# Patient Record
Sex: FEMALE | Race: WHITE | NOT HISPANIC OR LATINO | Employment: OTHER | URBAN - METROPOLITAN AREA
[De-identification: names, ages, dates, MRNs, and addresses within clinical notes are randomized per-mention and may not be internally consistent; named-entity substitution may affect disease eponyms.]

---

## 2017-03-01 RX ORDER — SIMVASTATIN 20 MG/1
20 TABLET, FILM COATED ORAL DAILY
Qty: 90 TABLET | Refills: 3 | Status: CANCELLED | OUTPATIENT
Start: 2017-03-01

## 2017-09-06 RX ORDER — CLOPIDOGREL BISULFATE 75 MG/1
TABLET ORAL
Qty: 90 TABLET | Refills: 3 | OUTPATIENT
Start: 2017-09-06

## 2017-11-13 RX ORDER — CLOPIDOGREL BISULFATE 75 MG/1
TABLET ORAL
Qty: 90 TABLET | Refills: 3 | OUTPATIENT
Start: 2017-11-13

## 2019-12-02 ENCOUNTER — TELEPHONE (OUTPATIENT)
Dept: FAMILY MEDICINE | Facility: CLINIC | Age: 84
End: 2019-12-02

## 2019-12-02 NOTE — TELEPHONE ENCOUNTER
----- Message from Analia Sethi sent at 12/2/2019  1:56 PM CST -----  - pt is calling to let dr. Jasmine know that dr. patton has decided not to due pacemaker. He does not think it will due her enough good.   [phone]

## 2019-12-10 ENCOUNTER — TELEPHONE (OUTPATIENT)
Dept: FAMILY MEDICINE | Facility: CLINIC | Age: 84
End: 2019-12-10

## 2019-12-10 DIAGNOSIS — E11.9 DIABETES MELLITUS TYPE 2, CONTROLLED, WITHOUT COMPLICATIONS: ICD-10-CM

## 2019-12-10 RX ORDER — GLIPIZIDE 2.5 MG/1
2.5 TABLET, EXTENDED RELEASE ORAL
Qty: 90 TABLET | Refills: 1 | Status: SHIPPED | OUTPATIENT
Start: 2019-12-10 | End: 2020-02-04 | Stop reason: SDUPTHER

## 2019-12-10 NOTE — TELEPHONE ENCOUNTER
----- Message from De Iyer sent at 12/10/2019  9:30 AM CST -----  Contact: Tierra Jennings  Needs Glipizide 2.5 MG tab  Send to [facility] on [address]  Pt# [phone]

## 2019-12-10 NOTE — TELEPHONE ENCOUNTER
----- Message from De Iyer sent at 12/10/2019  9:30 AM CST -----  Contact: Tierra Jennings  Needs Glipizide 2.5 MG tab  Send to [facility] on [address]  Pt# [phone]

## 2020-01-09 RX ORDER — METFORMIN HYDROCHLORIDE EXTENDED-RELEASE TABLETS 500 MG/1
500 TABLET, FILM COATED, EXTENDED RELEASE ORAL
Qty: 30 TABLET | Refills: 5 | Status: SHIPPED | OUTPATIENT
Start: 2020-01-09 | End: 2020-01-14

## 2020-01-09 NOTE — TELEPHONE ENCOUNTER
----- Message from Maria De Jesus Parsons sent at 1/9/2020 11:56 AM CST -----  Refills on metformin   Pharm [facility] [address]   Pt [phone]

## 2020-01-13 ENCOUNTER — TELEPHONE (OUTPATIENT)
Dept: FAMILY MEDICINE | Facility: CLINIC | Age: 85
End: 2020-01-13

## 2020-01-13 NOTE — TELEPHONE ENCOUNTER
----- Message from De Iyer sent at 1/13/2020  3:06 PM CST -----  Contact: Tierra Jennings  Wrong meds was sent to pharmacy. Pt needs Metformin  Send to [facility] on on [address]  Pt# [phone]

## 2020-01-14 DIAGNOSIS — E11.8 CONTROLLED TYPE 2 DIABETES MELLITUS WITH COMPLICATION, WITHOUT LONG-TERM CURRENT USE OF INSULIN: Primary | ICD-10-CM

## 2020-01-14 RX ORDER — METFORMIN HYDROCHLORIDE 500 MG/1
500 TABLET, EXTENDED RELEASE ORAL
Qty: 30 TABLET | Refills: 0 | Status: SHIPPED | OUTPATIENT
Start: 2020-01-14 | End: 2020-02-04 | Stop reason: SDUPTHER

## 2020-01-14 NOTE — TELEPHONE ENCOUNTER
Spoke with pt she needs refills on her metformin. She is having trouble getting a ride. Pt was told we can only give her 30 days then she will need call back and schedule an appt.

## 2020-01-14 NOTE — TELEPHONE ENCOUNTER
----- Message from Analia Sethi sent at 1/14/2020 10:01 AM CST -----  - pt has called twice about flu shots and said she has never received a call back. She is confused. And she needs refills, daughter has list if we need it faxed   [phone]

## 2020-02-04 ENCOUNTER — OFFICE VISIT (OUTPATIENT)
Dept: FAMILY MEDICINE | Facility: CLINIC | Age: 85
End: 2020-02-04
Payer: MEDICARE

## 2020-02-04 VITALS
HEART RATE: 68 BPM | BODY MASS INDEX: 44.59 KG/M2 | SYSTOLIC BLOOD PRESSURE: 110 MMHG | DIASTOLIC BLOOD PRESSURE: 70 MMHG | HEIGHT: 66 IN

## 2020-02-04 DIAGNOSIS — I10 ESSENTIAL HYPERTENSION: ICD-10-CM

## 2020-02-04 DIAGNOSIS — E11.8 CONTROLLED TYPE 2 DIABETES MELLITUS WITH COMPLICATION, WITHOUT LONG-TERM CURRENT USE OF INSULIN: ICD-10-CM

## 2020-02-04 DIAGNOSIS — E78.2 MIXED HYPERLIPIDEMIA: Primary | ICD-10-CM

## 2020-02-04 DIAGNOSIS — G62.9 NEUROPATHY: ICD-10-CM

## 2020-02-04 LAB — HBA1C MFR BLD: 6.5 %

## 2020-02-04 PROCEDURE — 83036 HEMOGLOBIN GLYCOSYLATED A1C: CPT | Mod: QW,,, | Performed by: NURSE PRACTITIONER

## 2020-02-04 PROCEDURE — 99214 OFFICE O/P EST MOD 30 MIN: CPT | Mod: S$GLB,,, | Performed by: NURSE PRACTITIONER

## 2020-02-04 PROCEDURE — 83036 POCT HEMOGLOBIN A1C: ICD-10-PCS | Mod: QW,,, | Performed by: NURSE PRACTITIONER

## 2020-02-04 PROCEDURE — 99214 PR OFFICE/OUTPT VISIT, EST, LEVL IV, 30-39 MIN: ICD-10-PCS | Mod: S$GLB,,, | Performed by: NURSE PRACTITIONER

## 2020-02-04 RX ORDER — METFORMIN HYDROCHLORIDE 500 MG/1
500 TABLET, EXTENDED RELEASE ORAL
Qty: 90 TABLET | Refills: 1 | Status: SHIPPED | OUTPATIENT
Start: 2020-02-04 | End: 2020-08-04 | Stop reason: SDUPTHER

## 2020-02-04 RX ORDER — OLMESARTAN MEDOXOMIL 40 MG/1
40 TABLET ORAL DAILY
Qty: 90 TABLET | Refills: 1 | Status: SHIPPED | OUTPATIENT
Start: 2020-02-04 | End: 2020-08-04 | Stop reason: SDUPTHER

## 2020-02-04 RX ORDER — FUROSEMIDE 40 MG/1
20 TABLET ORAL DAILY
COMMUNITY
Start: 2020-01-16 | End: 2021-02-25 | Stop reason: SDUPTHER

## 2020-02-04 RX ORDER — FLECAINIDE ACETATE 50 MG/1
50 TABLET ORAL 2 TIMES DAILY
COMMUNITY
Start: 2019-12-17 | End: 2021-03-08

## 2020-02-04 RX ORDER — APIXABAN 5 MG/1
5 TABLET, FILM COATED ORAL 2 TIMES DAILY
COMMUNITY
Start: 2020-01-24 | End: 2020-10-30 | Stop reason: SDUPTHER

## 2020-02-04 RX ORDER — GABAPENTIN 100 MG/1
100 CAPSULE ORAL DAILY
Refills: 1 | COMMUNITY
Start: 2019-11-25 | End: 2020-02-04 | Stop reason: SDUPTHER

## 2020-02-04 RX ORDER — ATORVASTATIN CALCIUM 40 MG/1
40 TABLET, FILM COATED ORAL NIGHTLY
COMMUNITY
Start: 2019-12-17 | End: 2021-02-25 | Stop reason: SDUPTHER

## 2020-02-04 RX ORDER — GLIPIZIDE 2.5 MG/1
2.5 TABLET, EXTENDED RELEASE ORAL
Qty: 90 TABLET | Refills: 1 | Status: SHIPPED | OUTPATIENT
Start: 2020-02-04 | End: 2020-08-04 | Stop reason: SDUPTHER

## 2020-02-04 RX ORDER — GABAPENTIN 100 MG/1
100 CAPSULE ORAL DAILY
Qty: 90 CAPSULE | Refills: 1 | Status: SHIPPED | OUTPATIENT
Start: 2020-02-04 | End: 2020-08-04 | Stop reason: SDUPTHER

## 2020-02-04 RX ORDER — AMLODIPINE BESYLATE 5 MG/1
5 TABLET ORAL NIGHTLY
Qty: 90 TABLET | Refills: 1 | Status: SHIPPED | OUTPATIENT
Start: 2020-02-04 | End: 2020-12-01 | Stop reason: SDUPTHER

## 2020-02-04 RX ORDER — METOPROLOL SUCCINATE 25 MG/1
12.5 TABLET, EXTENDED RELEASE ORAL DAILY
COMMUNITY
Start: 2020-01-28 | End: 2020-08-04 | Stop reason: SDUPTHER

## 2020-02-04 NOTE — PROGRESS NOTES
SUBJECTIVE:    Patient ID: Tierra Jennings is a 86 y.o. female.    Chief Complaint: Diabetes (6 month follow up.....mlr) and Medication Refill (Med bottles present and reconciled....pharmacy verified....mlr)    Pt presents for routine visit. History of DM, HTN, HLD, Afib. Chronic anticoagulation. Has not been checking her CBG regularly because she is having trouble working the new lancets she received. Reports she is doing well. No new complaints. Sees Dr. Diaz every 6 months. Needs new bloodwork. HbA1C in June 2019 was 6.8. She requires wheelchair to go more than a few steps. Recently saw Dr. Pa. Sees Dr. Vaughn for her feet.       No visits with results within 6 Month(s) from this visit.   Latest known visit with results is:   Lab Visit on 07/09/2016   Component Date Value Ref Range Status    Cholesterol 07/09/2016 123  120 - 199 mg/dL Final    Triglycerides 07/09/2016 110  30 - 150 mg/dL Final    HDL 07/09/2016 33* 40 - 75 mg/dL Final    LDL Cholesterol 07/09/2016 68.0  63.0 - 159.0 mg/dL Final    Hdl/Cholesterol Ratio 07/09/2016 26.8  20.0 - 50.0 % Final    Total Cholesterol/HDL Ratio 07/09/2016 3.7  2.0 - 5.0 Final    Non-HDL Cholesterol 07/09/2016 90  mg/dL Final    Hemoglobin A1C 07/09/2016 5.7  4.5 - 6.2 % Final    Estimated Avg Glucose 07/09/2016 117  68 - 131 mg/dL Final    Sodium 07/09/2016 139  136 - 145 mmol/L Final    Potassium 07/09/2016 4.2  3.5 - 5.1 mmol/L Final    Chloride 07/09/2016 107  95 - 110 mmol/L Final    CO2 07/09/2016 22* 23 - 29 mmol/L Final    Glucose 07/09/2016 131* 70 - 110 mg/dL Final    BUN, Bld 07/09/2016 18  8 - 23 mg/dL Final    Creatinine 07/09/2016 0.8  0.5 - 1.4 mg/dL Final    Calcium 07/09/2016 9.3  8.7 - 10.5 mg/dL Final    Total Protein 07/09/2016 6.9  6.0 - 8.4 g/dL Final    Albumin 07/09/2016 3.4* 3.5 - 5.2 g/dL Final    Total Bilirubin 07/09/2016 0.7  0.1 - 1.0 mg/dL Final    Alkaline Phosphatase 07/09/2016 70  55 - 135 U/L Final     AST 07/09/2016 19  10 - 40 U/L Final    ALT 07/09/2016 11  10 - 44 U/L Final    Anion Gap 07/09/2016 10  8 - 16 mmol/L Final    eGFR if African American 07/09/2016 >60.0  >60 mL/min/1.73 m^2 Final    eGFR if non African American 07/09/2016 >60.0  >60 mL/min/1.73 m^2 Final    WBC 07/09/2016 7.46  3.90 - 12.70 K/uL Final    RBC 07/09/2016 3.93* 4.00 - 5.40 M/uL Final    Hemoglobin 07/09/2016 12.0  12.0 - 16.0 g/dL Final    Hematocrit 07/09/2016 38.1  37.0 - 48.5 % Final    Mean Corpuscular Volume 07/09/2016 97  82 - 98 fL Final    Mean Corpuscular Hemoglobin 07/09/2016 30.5  27.0 - 31.0 pg Final    Mean Corpuscular Hemoglobin Conc 07/09/2016 31.5* 32.0 - 36.0 % Final    RDW 07/09/2016 15.7* 11.5 - 14.5 % Final    Platelets 07/09/2016 283  150 - 350 K/uL Final    MPV 07/09/2016 10.5  9.2 - 12.9 fL Final    Gran # (ANC) 07/09/2016 5.0  1.8 - 7.7 K/uL Final    Lymph # 07/09/2016 1.5  1.0 - 4.8 K/uL Final    Mono # 07/09/2016 0.8  0.3 - 1.0 K/uL Final    Eos # 07/09/2016 0.1  0.0 - 0.5 K/uL Final    Baso # 07/09/2016 0.02  0.00 - 0.20 K/uL Final    Gran% 07/09/2016 67.3  38.0 - 73.0 % Final    Lymph% 07/09/2016 20.4  18.0 - 48.0 % Final    Mono% 07/09/2016 10.2  4.0 - 15.0 % Final    Eosinophil% 07/09/2016 1.5  0.0 - 8.0 % Final    Basophil% 07/09/2016 0.3  0.0 - 1.9 % Final    Differential Method 07/09/2016 Automated   Final       Past Medical History:   Diagnosis Date    Diabetes mellitus, type 2     Heart murmur     Hypertension     Stroke      Past Surgical History:   Procedure Laterality Date    EYE SURGERY      TONSILLECTOMY      VEIN LIGATION AND STRIPPING       History reviewed. No pertinent family history.    Marital Status:   Alcohol History:  reports that she does not drink alcohol.  Tobacco History:  reports that she has never smoked. She has never used smokeless tobacco.  Drug History:  reports that she does not use drugs.    Review of patient's allergies indicates:    Allergen Reactions    Aspirin Other (See Comments)     Bleeding    Codeine     Erythromycin Rash       Current Outpatient Medications:     amLODIPine (NORVASC) 5 MG tablet, Take 1 tablet (5 mg total) by mouth nightly., Disp: 90 tablet, Rfl: 1    atorvastatin (LIPITOR) 40 MG tablet, Take 40 mg by mouth nightly., Disp: , Rfl:     blood sugar diagnostic (ONETOUCH ULTRA TEST) Strp, 1 strip by miscellaneous route 2 times daily. DX code 250.00, Disp: 100 strip, Rfl: 1    ELIQUIS 5 mg Tab, Take 5 mg by mouth once daily., Disp: , Rfl:     flecainide (TAMBOCOR) 50 MG Tab, Take 50 mg by mouth 2 (two) times daily., Disp: , Rfl:     furosemide (LASIX) 40 MG tablet, Take 20 mg by mouth once daily. , Disp: , Rfl:     gabapentin (NEURONTIN) 100 MG capsule, Take 1 capsule (100 mg total) by mouth once daily., Disp: 90 capsule, Rfl: 1    glipiZIDE (GLUCOTROL) 2.5 MG TR24, Take 1 tablet (2.5 mg total) by mouth daily with breakfast., Disp: 90 tablet, Rfl: 1    metFORMIN (GLUCOPHAGE-XR) 500 MG 24 hr tablet, Take 1 tablet (500 mg total) by mouth daily with breakfast., Disp: 90 tablet, Rfl: 1    metoprolol succinate (TOPROL-XL) 25 MG 24 hr tablet, Take 12.5 mg by mouth once daily., Disp: , Rfl:     olmesartan (BENICAR) 40 MG tablet, Take 1 tablet (40 mg total) by mouth once daily., Disp: 90 tablet, Rfl: 1    potassium chloride SA (K-DUR,KLOR-CON) 10 MEQ tablet, Take 1 tablet (10 mEq total) by mouth once daily., Disp: 30 tablet, Rfl: 3    Review of Systems   Constitutional: Negative.    HENT: Negative for congestion, ear pain, sinus pressure, sinus pain and trouble swallowing.    Eyes: Negative for pain and redness.   Respiratory: Negative for cough and shortness of breath.    Cardiovascular: Negative for chest pain and palpitations.   Gastrointestinal: Negative for abdominal pain and nausea.   Genitourinary: Negative for dysuria, frequency and urgency.   Musculoskeletal: Negative for arthralgias, back pain and myalgias.  "  Skin: Negative for rash.   Neurological: Negative for dizziness, weakness, light-headedness and headaches.   Psychiatric/Behavioral: Negative.           Objective:      Vitals:    02/04/20 0902   BP: 110/70   Pulse: 68   Height: 5' 6" (1.676 m)     Body mass index is 44.59 kg/m².  Physical Exam   Constitutional: She is oriented to person, place, and time. She appears well-developed and well-nourished. No distress.   HENT:   Head: Normocephalic and atraumatic.   Right Ear: Tympanic membrane and external ear normal.   Left Ear: Tympanic membrane and external ear normal.   Nose: Nose normal. No mucosal edema or nasal deformity.   Mouth/Throat: Oropharynx is clear and moist and mucous membranes are normal. No dental abscesses or dental caries. No oropharyngeal exudate.   Eyes: Pupils are equal, round, and reactive to light. Conjunctivae, EOM and lids are normal.   Neck: Normal range of motion. No JVD present. No tracheal tenderness present.   Cardiovascular: Normal rate, regular rhythm and normal heart sounds.   No murmur heard.  Pulmonary/Chest: Effort normal and breath sounds normal. No accessory muscle usage. No respiratory distress. She has no rhonchi.   Abdominal: Soft. Normal appearance and bowel sounds are normal. There is no tenderness.   Musculoskeletal: Normal range of motion. She exhibits no tenderness.   Neurological: She is alert and oriented to person, place, and time.   Skin: Skin is warm and dry. Capillary refill takes less than 2 seconds. No bruising and no ecchymosis noted. No erythema.   Psychiatric: Cognition and memory are normal.   Vitals reviewed.        Assessment:       1. Mixed hyperlipidemia    2. Controlled type 2 diabetes mellitus with complication, without long-term current use of insulin    3. Essential hypertension    4. Diabetes mellitus type 2, controlled, without complications    5. Neuropathy         Plan:       Mixed hyperlipidemia  -     Lipid panel; Future; Expected date: " 02/04/2020  -     TSH; Future; Expected date: 02/04/2020    Controlled type 2 diabetes mellitus with complication, without long-term current use of insulin  -     metFORMIN (GLUCOPHAGE-XR) 500 MG 24 hr tablet; Take 1 tablet (500 mg total) by mouth daily with breakfast.  Dispense: 90 tablet; Refill: 1  -     Hemoglobin A1C, POCT  - A1C in office is 6.5. Doing well on current med regimen.     Essential hypertension  -     amLODIPine (NORVASC) 5 MG tablet; Take 1 tablet (5 mg total) by mouth nightly.  Dispense: 90 tablet; Refill: 1  -     olmesartan (BENICAR) 40 MG tablet; Take 1 tablet (40 mg total) by mouth once daily.  Dispense: 90 tablet; Refill: 1  -     CBC auto differential; Future; Expected date: 02/04/2020  -     Comprehensive metabolic panel; Future; Expected date: 02/04/2020    Diabetes mellitus type 2, controlled, without complications  -     glipiZIDE (GLUCOTROL) 2.5 MG TR24; Take 1 tablet (2.5 mg total) by mouth daily with breakfast.  Dispense: 90 tablet; Refill: 1    Neuropathy  -     gabapentin (NEURONTIN) 100 MG capsule; Take 1 capsule (100 mg total) by mouth once daily.  Dispense: 90 capsule; Refill: 1      Follow up in about 6 months (around 8/4/2020) for Annual Physical.

## 2020-02-05 ENCOUNTER — TELEPHONE (OUTPATIENT)
Dept: FAMILY MEDICINE | Facility: CLINIC | Age: 85
End: 2020-02-05

## 2020-02-05 LAB
ALBUMIN SERPL-MCNC: 3.9 G/DL (ref 3.6–5.1)
ALBUMIN/GLOB SERPL: 1.1 (CALC) (ref 1–2.5)
ALP SERPL-CCNC: 133 U/L (ref 37–153)
ALT SERPL-CCNC: 18 U/L (ref 6–29)
AST SERPL-CCNC: 22 U/L (ref 10–35)
BASOPHILS # BLD AUTO: 62 CELLS/UL (ref 0–200)
BASOPHILS NFR BLD AUTO: 0.5 %
BILIRUB SERPL-MCNC: 0.6 MG/DL (ref 0.2–1.2)
BUN SERPL-MCNC: 25 MG/DL (ref 7–25)
BUN/CREAT SERPL: 23 (CALC) (ref 6–22)
CALCIUM SERPL-MCNC: 9.2 MG/DL (ref 8.6–10.4)
CHLORIDE SERPL-SCNC: 103 MMOL/L (ref 98–110)
CHOLEST SERPL-MCNC: 98 MG/DL
CHOLEST/HDLC SERPL: 2.8 (CALC)
CO2 SERPL-SCNC: 26 MMOL/L (ref 20–32)
CREAT SERPL-MCNC: 1.1 MG/DL (ref 0.6–0.88)
EOSINOPHIL # BLD AUTO: 123 CELLS/UL (ref 15–500)
EOSINOPHIL NFR BLD AUTO: 1 %
ERYTHROCYTE [DISTWIDTH] IN BLOOD BY AUTOMATED COUNT: 12.6 % (ref 11–15)
GFRSERPLBLD MDRD-ARVRAT: 45 ML/MIN/1.73M2
GLOBULIN SER CALC-MCNC: 3.5 G/DL (CALC) (ref 1.9–3.7)
GLUCOSE SERPL-MCNC: 146 MG/DL (ref 65–99)
HCT VFR BLD AUTO: 35.6 % (ref 35–45)
HDLC SERPL-MCNC: 35 MG/DL
HGB BLD-MCNC: 12 G/DL (ref 11.7–15.5)
LDLC SERPL CALC-MCNC: 44 MG/DL (CALC)
LYMPHOCYTES # BLD AUTO: 2005 CELLS/UL (ref 850–3900)
LYMPHOCYTES NFR BLD AUTO: 16.3 %
MCH RBC QN AUTO: 30.4 PG (ref 27–33)
MCHC RBC AUTO-ENTMCNC: 33.7 G/DL (ref 32–36)
MCV RBC AUTO: 90.1 FL (ref 80–100)
MONOCYTES # BLD AUTO: 787 CELLS/UL (ref 200–950)
MONOCYTES NFR BLD AUTO: 6.4 %
NEUTROPHILS # BLD AUTO: 9323 CELLS/UL (ref 1500–7800)
NEUTROPHILS NFR BLD AUTO: 75.8 %
NONHDLC SERPL-MCNC: 63 MG/DL (CALC)
PLATELET # BLD AUTO: 356 THOUSAND/UL (ref 140–400)
PMV BLD REES-ECKER: 10.7 FL (ref 7.5–12.5)
POTASSIUM SERPL-SCNC: 4.1 MMOL/L (ref 3.5–5.3)
PROT SERPL-MCNC: 7.4 G/DL (ref 6.1–8.1)
RBC # BLD AUTO: 3.95 MILLION/UL (ref 3.8–5.1)
SODIUM SERPL-SCNC: 139 MMOL/L (ref 135–146)
TRIGL SERPL-MCNC: 106 MG/DL
TSH SERPL-ACNC: 3.51 MIU/L (ref 0.4–4.5)
WBC # BLD AUTO: 12.3 THOUSAND/UL (ref 3.8–10.8)

## 2020-02-06 ENCOUNTER — TELEPHONE (OUTPATIENT)
Dept: FAMILY MEDICINE | Facility: CLINIC | Age: 85
End: 2020-02-06

## 2020-02-06 NOTE — TELEPHONE ENCOUNTER
Spoke to daughter, Jody, with results verbatim per Dejah. Verbalized understanding on all. States that her mom has not been feeling bad that she knows of. Will check with her and call back if she states she has been. Remind me for 4 week lab. Aware we will call when this is due.

## 2020-02-06 NOTE — TELEPHONE ENCOUNTER
----- Message from Analia Sethi sent at 2/6/2020  9:59 AM CST -----  Pt saw prashanth and her list of medications is wrong. eliquis 5mg says take once a day. She takes it twice a day.   [phone]

## 2020-02-06 NOTE — TELEPHONE ENCOUNTER
----- Message from Dejah Obrien NP sent at 2/5/2020  4:43 PM CST -----  Kidney function is decreased on lab work. However, this is not a new issue and her kidneys are stable. Liver function normal. Cholesterol is normal. Thyroid function is normal. White blood cell count and neutrophil count is elevated. This is a new finding. Please verify with patient that she is not having any illnesses or symptoms currently. If not, let's repeat a CBC in 4 weeks to monitor.

## 2020-02-06 NOTE — TELEPHONE ENCOUNTER
----- Message from Analia Sethi sent at 2/6/2020  9:59 AM CST -----  Pt saw prashanth and her list of medications is wrong. eliquis 5mg says take once a day. She takes it twice a day.   [phone]

## 2020-02-27 ENCOUNTER — TELEPHONE (OUTPATIENT)
Dept: FAMILY MEDICINE | Facility: CLINIC | Age: 85
End: 2020-02-27

## 2020-02-27 DIAGNOSIS — D72.9 ABNORMAL WHITE BLOOD CELL (WBC): Primary | ICD-10-CM

## 2020-02-27 NOTE — TELEPHONE ENCOUNTER
----- Message from [facility], RT sent at 2/6/2020 12:22 PM CST -----  Regarding: Lab due  Dejah Obrien NP on 2/5/2020 at 4:43 PM CST  Kidney function is decreased on lab work. However, this is not a new issue and her kidneys are stable. Liver function normal. Cholesterol is normal. Thyroid function is normal. White blood cell count and neutrophil count is elevated. This is a new finding. Please verify with patient that she is not having any illnesses or symptoms currently. If not, let's repeat a CBC in 4 weeks to monitor.

## 2020-03-11 ENCOUNTER — TELEPHONE (OUTPATIENT)
Dept: FAMILY MEDICINE | Facility: CLINIC | Age: 85
End: 2020-03-11

## 2020-03-11 NOTE — TELEPHONE ENCOUNTER
From Remind Me-lab not done-2nd attempt. Spoke to patient that lab is still due to recheck blood counts. Patient said that right now she is not going anywhere until the Coronavirus is settled. Said she will come when everything settles down. Informed her that order is in computer and she can come when she feels comfortable. Verbalized understanding. Can I torsten reminder complete?

## 2020-03-11 NOTE — TELEPHONE ENCOUNTER
----- Message from [facility], RT sent at 2/6/2020 12:22 PM CST -----  Regarding: Lab due  Dejah Obrien NP on 2/5/2020 at 4:43 PM CST  Kidney function is decreased on lab work. However, this is not a new issue and her kidneys are stable. Liver function normal. Cholesterol is normal. Thyroid function is normal. White blood cell count and neutrophil count is elevated. This is a new finding. Please verify with patient that she is not having any illnesses or symptoms currently. If not, let's repeat a CBC in 4 weeks to monitor.

## 2020-03-30 ENCOUNTER — TELEPHONE (OUTPATIENT)
Dept: FAMILY MEDICINE | Facility: CLINIC | Age: 85
End: 2020-03-30

## 2020-07-21 ENCOUNTER — TELEPHONE (OUTPATIENT)
Dept: FAMILY MEDICINE | Facility: CLINIC | Age: 85
End: 2020-07-21

## 2020-07-22 ENCOUNTER — TELEPHONE (OUTPATIENT)
Dept: FAMILY MEDICINE | Facility: CLINIC | Age: 85
End: 2020-07-22

## 2020-07-22 NOTE — TELEPHONE ENCOUNTER
Spoke with pt who states her daughter made the appointment on August 4th and she had questions about the labs. She states she has not been out of the house since the covid and she doesn't have any transportation so she wanted to know if she can just have the lab done while she is here that day. I let her know that she can since she has no way of getting her before.

## 2020-07-22 NOTE — TELEPHONE ENCOUNTER
----- Message from De Iyer sent at 7/22/2020  9:53 AM CDT -----  Regarding: Needs call back  from nurse  Contact: bebo ewing  Pt is calling, she needs the nurse to give her a call back . I did let her know that she needs labs done before her visit on 08/04, but she still would like to have the nurse a call back #[phone]

## 2020-07-27 ENCOUNTER — TELEPHONE (OUTPATIENT)
Dept: FAMILY MEDICINE | Facility: CLINIC | Age: 85
End: 2020-07-27

## 2020-07-30 ENCOUNTER — TELEPHONE (OUTPATIENT)
Dept: FAMILY MEDICINE | Facility: CLINIC | Age: 85
End: 2020-07-30

## 2020-07-30 NOTE — TELEPHONE ENCOUNTER
----- Message from Analia Sethi sent at 7/30/2020 12:00 PM CDT -----  - pt received a call and she is high risk and not sure if she should come in. She is still taking all the same medications. The only problems she is having is hemroids, because the medication she in on with dr. Diaz she does not believe she can have them removed. She is using wipes and they are not bleeding.   [phone]

## 2020-08-04 ENCOUNTER — OFFICE VISIT (OUTPATIENT)
Dept: FAMILY MEDICINE | Facility: CLINIC | Age: 85
End: 2020-08-04
Payer: MEDICARE

## 2020-08-04 VITALS — DIASTOLIC BLOOD PRESSURE: 72 MMHG | SYSTOLIC BLOOD PRESSURE: 137 MMHG

## 2020-08-04 DIAGNOSIS — E78.2 MIXED HYPERLIPIDEMIA: ICD-10-CM

## 2020-08-04 DIAGNOSIS — I10 ESSENTIAL HYPERTENSION: ICD-10-CM

## 2020-08-04 DIAGNOSIS — E11.8 CONTROLLED TYPE 2 DIABETES MELLITUS WITH COMPLICATION, WITHOUT LONG-TERM CURRENT USE OF INSULIN: ICD-10-CM

## 2020-08-04 DIAGNOSIS — G62.9 NEUROPATHY: ICD-10-CM

## 2020-08-04 DIAGNOSIS — K64.9 HEMORRHOIDS, UNSPECIFIED HEMORRHOID TYPE: Primary | ICD-10-CM

## 2020-08-04 PROCEDURE — 99442 PR PHYSICIAN TELEPHONE EVALUATION 11-20 MIN: ICD-10-PCS | Mod: 95,,, | Performed by: NURSE PRACTITIONER

## 2020-08-04 PROCEDURE — 99442 PR PHYSICIAN TELEPHONE EVALUATION 11-20 MIN: CPT | Mod: 95,,, | Performed by: NURSE PRACTITIONER

## 2020-08-04 RX ORDER — HYDROCORTISONE 25 MG/G
CREAM TOPICAL 2 TIMES DAILY
Qty: 28 G | Refills: 1 | Status: SHIPPED | OUTPATIENT
Start: 2020-08-04 | End: 2021-07-02 | Stop reason: SDUPTHER

## 2020-08-04 RX ORDER — GABAPENTIN 100 MG/1
100 CAPSULE ORAL DAILY
Qty: 90 CAPSULE | Refills: 1 | Status: SHIPPED | OUTPATIENT
Start: 2020-08-04 | End: 2021-02-25 | Stop reason: SDUPTHER

## 2020-08-04 RX ORDER — METOPROLOL SUCCINATE 25 MG/1
12.5 TABLET, EXTENDED RELEASE ORAL DAILY
Qty: 90 TABLET | Refills: 1 | Status: SHIPPED | OUTPATIENT
Start: 2020-08-04 | End: 2020-12-01 | Stop reason: SDUPTHER

## 2020-08-04 RX ORDER — GLIPIZIDE 2.5 MG/1
2.5 TABLET, EXTENDED RELEASE ORAL
Qty: 90 TABLET | Refills: 1 | Status: SHIPPED | OUTPATIENT
Start: 2020-08-04 | End: 2021-02-25 | Stop reason: SDUPTHER

## 2020-08-04 RX ORDER — METFORMIN HYDROCHLORIDE 500 MG/1
500 TABLET, EXTENDED RELEASE ORAL
Qty: 90 TABLET | Refills: 1 | Status: SHIPPED | OUTPATIENT
Start: 2020-08-04 | End: 2021-02-10 | Stop reason: SDUPTHER

## 2020-08-04 RX ORDER — OLMESARTAN MEDOXOMIL 40 MG/1
40 TABLET ORAL DAILY
Qty: 90 TABLET | Refills: 1 | Status: SHIPPED | OUTPATIENT
Start: 2020-08-04 | End: 2020-12-01 | Stop reason: SDUPTHER

## 2020-08-04 NOTE — PROGRESS NOTES
Subjective:        The chief complaint leading to consultation is: follow-up for DM  The patient location is:  Home  Visit type: Virtual visit with synchronous audio/video or audio only  This was a phone conversation in lieu of in-person visit due to the coronavirus emergency. Patient acknowledged and agreed to the telephone encounter.     Pt presents for routine follow-up. Blood pressure doing well. Missed appointment with Dr. pooja Cardona and unsure when she should follow-up. CBG also well-controlled. Lab work at last appointment was very good. C/o hemorrhoids and requesting cream to help. Has tried wipes but do not seem to help much. Per pt, has not left home since start of pandemic. Her daughter and son take care of her.       Past Surgical History:   Procedure Laterality Date    EYE SURGERY      TONSILLECTOMY      VEIN LIGATION AND STRIPPING       Past Medical History:   Diagnosis Date    Diabetes mellitus, type 2     Heart murmur     Hypertension     Stroke      No family history on file.     Social History:   Marital Status:   Alcohol History:  reports no history of alcohol use.  Tobacco History:  reports that she has never smoked. She has never used smokeless tobacco.  Drug History:  reports no history of drug use.    Review of patient's allergies indicates:   Allergen Reactions    Aspirin Other (See Comments)     Bleeding    Codeine     Erythromycin Rash       Current Outpatient Medications   Medication Sig Dispense Refill    amLODIPine (NORVASC) 5 MG tablet Take 1 tablet (5 mg total) by mouth nightly. 90 tablet 1    atorvastatin (LIPITOR) 40 MG tablet Take 40 mg by mouth nightly.      blood sugar diagnostic (ONETOUCH ULTRA TEST) Strp 1 strip by miscellaneous route 2 times daily. DX code 250.00 100 strip 1    ELIQUIS 5 mg Tab Take 5 mg by mouth 2 (two) times daily.       flecainide (TAMBOCOR) 50 MG Tab Take 50 mg by mouth 2 (two) times daily.      furosemide (LASIX) 40 MG  tablet Take 20 mg by mouth once daily.       gabapentin (NEURONTIN) 100 MG capsule Take 1 capsule (100 mg total) by mouth once daily. 90 capsule 1    glipiZIDE (GLUCOTROL) 2.5 MG TR24 Take 1 tablet (2.5 mg total) by mouth daily with breakfast. 90 tablet 1    hydrocortisone (ANUSOL-HC) 2.5 % rectal cream Place rectally 2 (two) times daily. 28 g 1    metFORMIN (GLUCOPHAGE-XR) 500 MG XR 24hr tablet Take 1 tablet (500 mg total) by mouth daily with breakfast. 90 tablet 1    metoprolol succinate (TOPROL-XL) 25 MG 24 hr tablet Take 0.5 tablets (12.5 mg total) by mouth once daily. 90 tablet 1    olmesartan (BENICAR) 40 MG tablet Take 1 tablet (40 mg total) by mouth once daily. 90 tablet 1    potassium chloride SA (K-DUR,KLOR-CON) 10 MEQ tablet Take 1 tablet (10 mEq total) by mouth once daily. 30 tablet 3     No current facility-administered medications for this visit.        Review of Systems   Constitutional: Negative.    HENT: Negative for congestion, ear pain, sinus pressure, sinus pain, tinnitus and trouble swallowing.    Eyes: Negative for pain and redness.   Respiratory: Negative for cough, chest tightness, shortness of breath and wheezing.    Cardiovascular: Negative for chest pain and palpitations.   Gastrointestinal: Positive for rectal pain (hemorrhoids). Negative for abdominal pain, nausea and vomiting.   Genitourinary: Negative for dysuria, frequency and urgency.   Musculoskeletal: Positive for arthralgias. Negative for back pain and myalgias.   Skin: Negative for rash and wound.   Neurological: Negative for dizziness, weakness, light-headedness and headaches.   Psychiatric/Behavioral: Negative.          Objective:        Physical Exam:   Physical Exam  Pulmonary:      Effort: No respiratory distress.   Neurological:      Mental Status: She is alert and oriented to person, place, and time.   Psychiatric:         Mood and Affect: Mood normal.              Assessment:       1. Hemorrhoids, unspecified  hemorrhoid type    2. Controlled type 2 diabetes mellitus with complication, without long-term current use of insulin    3. Essential hypertension    4. Neuropathy    5. Mixed hyperlipidemia      Plan:   Hemorrhoids, unspecified hemorrhoid type  -     hydrocortisone (ANUSOL-HC) 2.5 % rectal cream; Place rectally 2 (two) times daily.  Dispense: 28 g; Refill: 1    Controlled type 2 diabetes mellitus with complication, without long-term current use of insulin  -     glipiZIDE (GLUCOTROL) 2.5 MG TR24; Take 1 tablet (2.5 mg total) by mouth daily with breakfast.  Dispense: 90 tablet; Refill: 1  -     metFORMIN (GLUCOPHAGE-XR) 500 MG XR 24hr tablet; Take 1 tablet (500 mg total) by mouth daily with breakfast.  Dispense: 90 tablet; Refill: 1    Essential hypertension  -     olmesartan (BENICAR) 40 MG tablet; Take 1 tablet (40 mg total) by mouth once daily.  Dispense: 90 tablet; Refill: 1  -     metoprolol succinate (TOPROL-XL) 25 MG 24 hr tablet; Take 0.5 tablets (12.5 mg total) by mouth once daily.  Dispense: 90 tablet; Refill: 1    Neuropathy  -     gabapentin (NEURONTIN) 100 MG capsule; Take 1 capsule (100 mg total) by mouth once daily.  Dispense: 90 capsule; Refill: 1    Mixed hyperlipidemia    Will get lab work at next visit  Follow up in about 6 months (around 2/4/2021) for DM- with labs, Follow-up with Dr. Jasmine.    Total time spent with patient: 20 minutes    Each patient to whom he or she provides medical services by telemedicine is:  (1) informed of the relationship between the physician and patient and the respective role of any other health care provider with respect to management of the patient; and (2) notified that he or she may decline to receive medical services by telemedicine and may withdraw from such care at any time.    This note was created using [employer] voice recognition software that occasionally misinterprets phrases or words.

## 2020-10-30 RX ORDER — APIXABAN 5 MG/1
5 TABLET, FILM COATED ORAL 2 TIMES DAILY
Qty: 60 TABLET | Refills: 0 | Status: SHIPPED | OUTPATIENT
Start: 2020-10-30 | End: 2021-08-11 | Stop reason: SDUPTHER

## 2020-10-30 NOTE — TELEPHONE ENCOUNTER
----- Message from Meri Ramey sent at 10/30/2020  8:57 AM CDT -----  Regarding: medication refill  Please give patient daughter a call [phone] regarding Eliquis 5mg she is out and awaiting mail shipment she is requesting a refill be sent to local pharmacy [facility] on [address] which is on her chart

## 2020-11-06 ENCOUNTER — OFFICE VISIT (OUTPATIENT)
Dept: CARDIOLOGY | Facility: CLINIC | Age: 85
End: 2020-11-06
Payer: MEDICARE

## 2020-11-06 ENCOUNTER — IMMUNIZATION (OUTPATIENT)
Dept: PHARMACY | Facility: CLINIC | Age: 85
End: 2020-11-06
Payer: COMMERCIAL

## 2020-11-06 VITALS
SYSTOLIC BLOOD PRESSURE: 110 MMHG | OXYGEN SATURATION: 99 % | HEIGHT: 66 IN | DIASTOLIC BLOOD PRESSURE: 60 MMHG | HEART RATE: 61 BPM | TEMPERATURE: 97 F | BODY MASS INDEX: 44.59 KG/M2 | RESPIRATION RATE: 16 BRPM

## 2020-11-06 DIAGNOSIS — Z86.73 HISTORY OF CVA (CEREBROVASCULAR ACCIDENT): ICD-10-CM

## 2020-11-06 DIAGNOSIS — E11.9 TYPE 2 DIABETES MELLITUS WITHOUT COMPLICATION, WITHOUT LONG-TERM CURRENT USE OF INSULIN: ICD-10-CM

## 2020-11-06 DIAGNOSIS — E78.5 DYSLIPIDEMIA: ICD-10-CM

## 2020-11-06 DIAGNOSIS — Z79.01 CHRONIC ANTICOAGULATION: ICD-10-CM

## 2020-11-06 DIAGNOSIS — I10 HYPERTENSION, UNSPECIFIED TYPE: ICD-10-CM

## 2020-11-06 DIAGNOSIS — I48.0 PAF (PAROXYSMAL ATRIAL FIBRILLATION): Primary | ICD-10-CM

## 2020-11-06 PROCEDURE — 99213 PR OFFICE/OUTPT VISIT, EST, LEVL III, 20-29 MIN: ICD-10-PCS | Mod: S$GLB,,, | Performed by: NURSE PRACTITIONER

## 2020-11-06 PROCEDURE — 99213 OFFICE O/P EST LOW 20 MIN: CPT | Mod: S$GLB,,, | Performed by: NURSE PRACTITIONER

## 2020-11-06 NOTE — PROGRESS NOTES
Subjective:    Patient ID:  Tierra Jennings is a 87 y.o. female patient here for evaluation Atrial Fibrillation, Diabetes, and Hypertension      History of Present Illness:       Ms. Jennings is here today for her follow up visit. She denies chest pain or SOB. She denies any dizziness or lightheadedness. She is doing well for the most part besides her worry with the Presendtial Election and Hemorrhoids.      Review of patient's allergies indicates:   Allergen Reactions    Aspirin Other (See Comments)     Bleeding    Codeine     Erythromycin Rash       Past Medical History:   Diagnosis Date    Diabetes mellitus, type 2     Heart murmur     Hypertension     Stroke      Past Surgical History:   Procedure Laterality Date    EYE SURGERY      TONSILLECTOMY      VEIN LIGATION AND STRIPPING       Social History     Tobacco Use    Smoking status: Never Smoker    Smokeless tobacco: Never Used   Substance Use Topics    Alcohol use: No    Drug use: No        Review of Systems:    As noted in HPI in addition      REVIEW OF SYSTEMS  CARDIOVASCULAR: No recent chest pain, palpitations, arm, neck, or jaw pain  RESPIRATORY: No recent fever, cough chills, SOB or congestion  : No blood in the urine  GI: No Nausea, vomiting, constipation, diarrhea, blood, or reflux.  MUSCULOSKELETAL: No myalgias  NEURO: No lightheadedness or dizziness  EYES: No Double vision, blurry, vision or headache          Objective:        Vitals:    11/06/20 1141   BP: 110/60   Pulse: 61   Resp: 16   Temp: 97.3 °F (36.3 °C)       LIPIDS - LAST 2   Lab Results   Component Value Date    CHOL 98 02/04/2020    CHOL 123 07/09/2016    HDL 35 (L) 02/04/2020    HDL 33 (L) 07/09/2016    LDLCALC 44 02/04/2020    LDLCALC 68.0 07/09/2016    TRIG 106 02/04/2020    TRIG 110 07/09/2016    CHOLHDL 2.8 02/04/2020    CHOLHDL 26.8 07/09/2016       CBC - LAST 2  Lab Results   Component Value Date    WBC 12.3 (H) 02/04/2020    WBC 7.46 07/09/2016    RBC 3.95  02/04/2020    RBC 3.93 (L) 07/09/2016    HGB 12.0 02/04/2020    HGB 12.0 07/09/2016    HCT 35.6 02/04/2020    HCT 38.1 07/09/2016    MCV 90.1 02/04/2020    MCV 97 07/09/2016    MCH 30.4 02/04/2020    MCH 30.5 07/09/2016    MCHC 33.7 02/04/2020    MCHC 31.5 (L) 07/09/2016    RDW 12.6 02/04/2020    RDW 15.7 (H) 07/09/2016     02/04/2020     07/09/2016    MPV 10.7 02/04/2020    MPV 10.5 07/09/2016    GRAN 5.0 07/09/2016    GRAN 67.3 07/09/2016    LYMPH 2,005 02/04/2020    LYMPH 16.3 02/04/2020    MONO 787 02/04/2020    MONO 6.4 02/04/2020    BASO 62 02/04/2020    BASO 0.02 07/09/2016       CHEMISTRY & LIVER FUNCTION - LAST 2  Lab Results   Component Value Date     02/04/2020     07/09/2016    K 4.1 02/04/2020    K 4.2 07/09/2016     02/04/2020     07/09/2016    CO2 26 02/04/2020    CO2 22 (L) 07/09/2016    ANIONGAP 10 07/09/2016    BUN 25 02/04/2020    BUN 18 07/09/2016    CREATININE 1.10 (H) 02/04/2020    CREATININE 0.8 07/09/2016     (H) 02/04/2020     (H) 07/09/2016    CALCIUM 9.2 02/04/2020    CALCIUM 9.3 07/09/2016    ALBUMIN 3.9 02/04/2020    ALBUMIN 3.4 (L) 07/09/2016    PROT 7.4 02/04/2020    PROT 6.9 07/09/2016    ALKPHOS 133 02/04/2020    ALKPHOS 70 07/09/2016    ALT 18 02/04/2020    ALT 11 07/09/2016    AST 22 02/04/2020    AST 19 07/09/2016    BILITOT 0.6 02/04/2020    BILITOT 0.7 07/09/2016        CARDIAC PROFILE - LAST 2  No results found for: BNP, CPK, CPKMB, LDH, TROPONINI     COAGULATION - LAST 2  No results found for: LABPT, INR, APTT    ENDOCRINE & PSA - LAST 2  Lab Results   Component Value Date    HGBA1C 6.5 02/04/2020    HGBA1C 5.7 07/09/2016    TSH 3.51 02/04/2020        ECHOCARDIOGRAM RESULTS    11/28/18- Technically difficult examination, normal left ventricle structure and function, EF is 60%, bubble study not diagnostic due body habitus, prominent pericardial fat pad, Normal pulmonary artery pressure.    CURRENT/PREVIOUS VISIT  EKG    6/21/19- Sinus bradycardia/sinus arrhythmia with rare tachycardia, Rates from 20 to 108 bpm, mean rate 57, First degree AV block, 11 PVC's, 4 PAC's, The longest pause was 3.57 seconds on day two at 4:53:32 am, No symptoms were reported.    PHYSICAL EXAM  CONSTITUTIONAL: Well built, well nourished in no apparent distress  NECK: no carotid bruit, no JVD  LUNGS: CTA  CHEST WALL: no tenderness  HEART: IRR  ABDOMEN: soft, non-tender; bowel sounds normal; no masses,  no organomegaly  EXTREMITIES: Chronic Lymphadema and dry skin changes  NEURO: AAO X 3    I HAVE REVIEWED :    The vital signs, nurses notes, and all the pertinent radiology and labs.         Current Outpatient Medications   Medication Instructions    amLODIPine (NORVASC) 5 mg, Oral, Nightly    atorvastatin (LIPITOR) 40 mg, Oral, Nightly    blood sugar diagnostic (ONETOUCH ULTRA TEST) Strp 1 strip by miscellaneous route 2 times daily. DX code 250.00    ELIQUIS 5 mg, Oral, 2 times daily    flecainide (TAMBOCOR) 50 mg, Oral, 2 times daily    furosemide (LASIX) 20 mg, Oral, Daily    gabapentin (NEURONTIN) 100 mg, Oral, Daily    glipiZIDE (GLUCOTROL) 2.5 mg, Oral, With breakfast    hydrocortisone (ANUSOL-HC) 2.5 % rectal cream Rectal, 2 times daily    metFORMIN (GLUCOPHAGE-XR) 500 mg, Oral, With breakfast    metoprolol succinate (TOPROL-XL) 12.5 mg, Oral, Daily    olmesartan (BENICAR) 40 mg, Oral, Daily    potassium chloride SA (K-DUR,KLOR-CON) 10 MEQ tablet 10 mEq, Oral, Daily          Assessment:       1. Paroxysmal Atrial Fibrillation with tachybrady syndrome- CURRENTLY SR-Stable no symptoms.  2. Chronic Anticoagulation with Eliquis  3. Diabetes Mellitus  4. hypertension  5. Dyslipidemia  6. History of CVA  7. Hemmrhoids        Plan:         Recommend continuing the same medications including Flecanide, Eliquis, statin and metformin at same doses  See her back in 4-6 months time.      No follow-ups on file.

## 2020-12-01 DIAGNOSIS — I10 ESSENTIAL HYPERTENSION: ICD-10-CM

## 2020-12-01 RX ORDER — OLMESARTAN MEDOXOMIL 40 MG/1
40 TABLET ORAL DAILY
Qty: 90 TABLET | Refills: 1 | Status: SHIPPED | OUTPATIENT
Start: 2020-12-01 | End: 2021-02-25 | Stop reason: SDUPTHER

## 2020-12-01 RX ORDER — METOPROLOL SUCCINATE 25 MG/1
12.5 TABLET, EXTENDED RELEASE ORAL DAILY
Qty: 90 TABLET | Refills: 1 | Status: SHIPPED | OUTPATIENT
Start: 2020-12-01 | End: 2021-02-25 | Stop reason: SDUPTHER

## 2020-12-01 RX ORDER — AMLODIPINE BESYLATE 5 MG/1
5 TABLET ORAL NIGHTLY
Qty: 90 TABLET | Refills: 1 | Status: SHIPPED | OUTPATIENT
Start: 2020-12-01 | End: 2021-02-25 | Stop reason: SDUPTHER

## 2020-12-01 NOTE — TELEPHONE ENCOUNTER
----- Message from Analia Sethi sent at 12/1/2020  9:13 AM CST -----  -pt needs refill on blood pressure medication  [facility]   [phone]

## 2021-01-18 ENCOUNTER — TELEPHONE (OUTPATIENT)
Dept: FAMILY MEDICINE | Facility: CLINIC | Age: 86
End: 2021-01-18

## 2021-01-25 ENCOUNTER — TELEPHONE (OUTPATIENT)
Dept: FAMILY MEDICINE | Facility: CLINIC | Age: 86
End: 2021-01-25

## 2021-01-26 ENCOUNTER — TELEPHONE (OUTPATIENT)
Dept: FAMILY MEDICINE | Facility: CLINIC | Age: 86
End: 2021-01-26

## 2021-01-27 ENCOUNTER — TELEPHONE (OUTPATIENT)
Dept: FAMILY MEDICINE | Facility: CLINIC | Age: 86
End: 2021-01-27

## 2021-02-03 ENCOUNTER — TELEPHONE (OUTPATIENT)
Dept: FAMILY MEDICINE | Facility: CLINIC | Age: 86
End: 2021-02-03

## 2021-02-03 DIAGNOSIS — I10 ESSENTIAL HYPERTENSION: ICD-10-CM

## 2021-02-03 DIAGNOSIS — E11.9 TYPE 2 DIABETES MELLITUS WITHOUT COMPLICATION, WITHOUT LONG-TERM CURRENT USE OF INSULIN: ICD-10-CM

## 2021-02-03 DIAGNOSIS — E78.2 MIXED HYPERLIPIDEMIA: ICD-10-CM

## 2021-02-03 DIAGNOSIS — Z79.899 ENCOUNTER FOR LONG-TERM (CURRENT) USE OF OTHER MEDICATIONS: Primary | ICD-10-CM

## 2021-02-05 ENCOUNTER — TELEPHONE (OUTPATIENT)
Dept: CARDIOLOGY | Facility: CLINIC | Age: 86
End: 2021-02-05

## 2021-02-09 DIAGNOSIS — E11.8 CONTROLLED TYPE 2 DIABETES MELLITUS WITH COMPLICATION, WITHOUT LONG-TERM CURRENT USE OF INSULIN: ICD-10-CM

## 2021-02-10 RX ORDER — METFORMIN HYDROCHLORIDE 500 MG/1
500 TABLET, EXTENDED RELEASE ORAL
Qty: 90 TABLET | Refills: 0 | Status: SHIPPED | OUTPATIENT
Start: 2021-02-10 | End: 2021-02-25 | Stop reason: SDUPTHER

## 2021-02-11 ENCOUNTER — TELEPHONE (OUTPATIENT)
Dept: FAMILY MEDICINE | Facility: CLINIC | Age: 86
End: 2021-02-11

## 2021-02-14 LAB
ALBUMIN SERPL-MCNC: 3.8 G/DL (ref 3.6–5.1)
ALBUMIN/GLOB SERPL: 1.1 (CALC) (ref 1–2.5)
ALP SERPL-CCNC: 126 U/L (ref 37–153)
ALT SERPL-CCNC: 17 U/L (ref 6–29)
AST SERPL-CCNC: 26 U/L (ref 10–35)
BASOPHILS # BLD AUTO: 72 CELLS/UL (ref 0–200)
BASOPHILS NFR BLD AUTO: 0.7 %
BILIRUB SERPL-MCNC: 0.5 MG/DL (ref 0.2–1.2)
BUN SERPL-MCNC: 25 MG/DL (ref 7–25)
BUN/CREAT SERPL: 23 (CALC) (ref 6–22)
CALCIUM SERPL-MCNC: 9.3 MG/DL (ref 8.6–10.4)
CHLORIDE SERPL-SCNC: 107 MMOL/L (ref 98–110)
CHOLEST SERPL-MCNC: 94 MG/DL
CHOLEST/HDLC SERPL: 2.9 (CALC)
CO2 SERPL-SCNC: 21 MMOL/L (ref 20–32)
CREAT SERPL-MCNC: 1.07 MG/DL (ref 0.6–0.88)
EOSINOPHIL # BLD AUTO: 185 CELLS/UL (ref 15–500)
EOSINOPHIL NFR BLD AUTO: 1.8 %
ERYTHROCYTE [DISTWIDTH] IN BLOOD BY AUTOMATED COUNT: 13.2 % (ref 11–15)
GFRSERPLBLD MDRD-ARVRAT: 47 ML/MIN/1.73M2
GLOBULIN SER CALC-MCNC: 3.6 G/DL (CALC) (ref 1.9–3.7)
GLUCOSE SERPL-MCNC: 144 MG/DL (ref 65–99)
HBA1C MFR BLD: 6.3 % OF TOTAL HGB
HCT VFR BLD AUTO: 37.1 % (ref 35–45)
HDLC SERPL-MCNC: 32 MG/DL
HGB BLD-MCNC: 12.1 G/DL (ref 11.7–15.5)
LDLC SERPL CALC-MCNC: 42 MG/DL (CALC)
LYMPHOCYTES # BLD AUTO: 1803 CELLS/UL (ref 850–3900)
LYMPHOCYTES NFR BLD AUTO: 17.5 %
MCH RBC QN AUTO: 29.6 PG (ref 27–33)
MCHC RBC AUTO-ENTMCNC: 32.6 G/DL (ref 32–36)
MCV RBC AUTO: 90.7 FL (ref 80–100)
MONOCYTES # BLD AUTO: 752 CELLS/UL (ref 200–950)
MONOCYTES NFR BLD AUTO: 7.3 %
NEUTROPHILS # BLD AUTO: 7488 CELLS/UL (ref 1500–7800)
NEUTROPHILS NFR BLD AUTO: 72.7 %
NONHDLC SERPL-MCNC: 62 MG/DL (CALC)
PLATELET # BLD AUTO: 361 THOUSAND/UL (ref 140–400)
PMV BLD REES-ECKER: 11 FL (ref 7.5–12.5)
POTASSIUM SERPL-SCNC: 4 MMOL/L (ref 3.5–5.3)
PROT SERPL-MCNC: 7.4 G/DL (ref 6.1–8.1)
RBC # BLD AUTO: 4.09 MILLION/UL (ref 3.8–5.1)
SODIUM SERPL-SCNC: 143 MMOL/L (ref 135–146)
TRIGL SERPL-MCNC: 116 MG/DL
TSH SERPL-ACNC: 3.83 MIU/L (ref 0.4–4.5)
WBC # BLD AUTO: 10.3 THOUSAND/UL (ref 3.8–10.8)

## 2021-02-17 ENCOUNTER — TELEPHONE (OUTPATIENT)
Dept: FAMILY MEDICINE | Facility: CLINIC | Age: 86
End: 2021-02-17

## 2021-02-18 ENCOUNTER — TELEPHONE (OUTPATIENT)
Dept: FAMILY MEDICINE | Facility: CLINIC | Age: 86
End: 2021-02-18

## 2021-02-24 ENCOUNTER — TELEPHONE (OUTPATIENT)
Dept: FAMILY MEDICINE | Facility: CLINIC | Age: 86
End: 2021-02-24

## 2021-02-25 ENCOUNTER — OFFICE VISIT (OUTPATIENT)
Dept: FAMILY MEDICINE | Facility: CLINIC | Age: 86
End: 2021-02-25
Payer: MEDICARE

## 2021-02-25 VITALS — SYSTOLIC BLOOD PRESSURE: 110 MMHG | HEART RATE: 92 BPM | OXYGEN SATURATION: 98 % | DIASTOLIC BLOOD PRESSURE: 74 MMHG

## 2021-02-25 DIAGNOSIS — I10 ESSENTIAL HYPERTENSION: Primary | ICD-10-CM

## 2021-02-25 DIAGNOSIS — E66.01 MORBID (SEVERE) OBESITY DUE TO EXCESS CALORIES: ICD-10-CM

## 2021-02-25 DIAGNOSIS — I48.0 PAF (PAROXYSMAL ATRIAL FIBRILLATION): ICD-10-CM

## 2021-02-25 DIAGNOSIS — E11.69 DM TYPE 2 WITH DIABETIC DYSLIPIDEMIA: ICD-10-CM

## 2021-02-25 DIAGNOSIS — N18.31 STAGE 3A CHRONIC KIDNEY DISEASE: ICD-10-CM

## 2021-02-25 DIAGNOSIS — E78.5 DYSLIPIDEMIA: ICD-10-CM

## 2021-02-25 DIAGNOSIS — G62.9 NEUROPATHY: ICD-10-CM

## 2021-02-25 DIAGNOSIS — E78.5 DM TYPE 2 WITH DIABETIC DYSLIPIDEMIA: ICD-10-CM

## 2021-02-25 DIAGNOSIS — I87.2 VENOUS INSUFFICIENCY OF BOTH LOWER EXTREMITIES: ICD-10-CM

## 2021-02-25 PROCEDURE — 99214 PR OFFICE/OUTPT VISIT, EST, LEVL IV, 30-39 MIN: ICD-10-PCS | Mod: S$GLB,,, | Performed by: NURSE PRACTITIONER

## 2021-02-25 PROCEDURE — 99214 OFFICE O/P EST MOD 30 MIN: CPT | Mod: S$GLB,,, | Performed by: NURSE PRACTITIONER

## 2021-02-25 RX ORDER — GLIPIZIDE 2.5 MG/1
2.5 TABLET, EXTENDED RELEASE ORAL
Qty: 90 TABLET | Refills: 1 | Status: SHIPPED | OUTPATIENT
Start: 2021-02-25 | End: 2021-08-26 | Stop reason: SDUPTHER

## 2021-02-25 RX ORDER — POTASSIUM CHLORIDE 750 MG/1
10 TABLET, EXTENDED RELEASE ORAL DAILY
Qty: 90 TABLET | Refills: 1 | Status: SHIPPED | OUTPATIENT
Start: 2021-02-25 | End: 2021-08-26 | Stop reason: SDUPTHER

## 2021-02-25 RX ORDER — OLMESARTAN MEDOXOMIL 40 MG/1
40 TABLET ORAL DAILY
Qty: 90 TABLET | Refills: 1 | Status: SHIPPED | OUTPATIENT
Start: 2021-02-25 | End: 2021-11-23 | Stop reason: SDUPTHER

## 2021-02-25 RX ORDER — METFORMIN HYDROCHLORIDE 500 MG/1
500 TABLET, EXTENDED RELEASE ORAL
Qty: 90 TABLET | Refills: 1 | Status: SHIPPED | OUTPATIENT
Start: 2021-02-25 | End: 2021-11-09 | Stop reason: SDUPTHER

## 2021-02-25 RX ORDER — ATORVASTATIN CALCIUM 40 MG/1
40 TABLET, FILM COATED ORAL NIGHTLY
Qty: 90 TABLET | Refills: 1 | Status: SHIPPED | OUTPATIENT
Start: 2021-02-25 | End: 2021-08-26 | Stop reason: SDUPTHER

## 2021-02-25 RX ORDER — AMLODIPINE BESYLATE 5 MG/1
5 TABLET ORAL NIGHTLY
Qty: 90 TABLET | Refills: 1 | Status: ON HOLD | OUTPATIENT
Start: 2021-02-25 | End: 2021-09-08

## 2021-02-25 RX ORDER — FUROSEMIDE 40 MG/1
20 TABLET ORAL DAILY
Qty: 45 TABLET | Refills: 1 | Status: SHIPPED | OUTPATIENT
Start: 2021-02-25 | End: 2021-10-04 | Stop reason: SDUPTHER

## 2021-02-25 RX ORDER — METOPROLOL SUCCINATE 25 MG/1
12.5 TABLET, EXTENDED RELEASE ORAL DAILY
Qty: 45 TABLET | Refills: 1 | Status: SHIPPED | OUTPATIENT
Start: 2021-02-25 | End: 2022-03-10 | Stop reason: SDUPTHER

## 2021-02-25 RX ORDER — GABAPENTIN 100 MG/1
100 CAPSULE ORAL DAILY
Qty: 90 CAPSULE | Refills: 1 | Status: SHIPPED | OUTPATIENT
Start: 2021-02-25 | End: 2021-05-19 | Stop reason: SDUPTHER

## 2021-03-02 ENCOUNTER — TELEPHONE (OUTPATIENT)
Dept: FAMILY MEDICINE | Facility: CLINIC | Age: 86
End: 2021-03-02

## 2021-03-20 ENCOUNTER — IMMUNIZATION (OUTPATIENT)
Dept: PRIMARY CARE CLINIC | Facility: CLINIC | Age: 86
End: 2021-03-20

## 2021-03-20 DIAGNOSIS — Z23 NEED FOR VACCINATION: Primary | ICD-10-CM

## 2021-03-20 PROCEDURE — 0001A COVID-19, MRNA, LNP-S, PF, 30 MCG/0.3 ML DOSE VACCINE: ICD-10-PCS | Mod: CV19,S$GLB,, | Performed by: FAMILY MEDICINE

## 2021-03-20 PROCEDURE — 91300 COVID-19, MRNA, LNP-S, PF, 30 MCG/0.3 ML DOSE VACCINE: CPT | Mod: S$GLB,,, | Performed by: FAMILY MEDICINE

## 2021-03-20 PROCEDURE — 0001A COVID-19, MRNA, LNP-S, PF, 30 MCG/0.3 ML DOSE VACCINE: CPT | Mod: CV19,S$GLB,, | Performed by: FAMILY MEDICINE

## 2021-03-20 PROCEDURE — 91300 COVID-19, MRNA, LNP-S, PF, 30 MCG/0.3 ML DOSE VACCINE: ICD-10-PCS | Mod: S$GLB,,, | Performed by: FAMILY MEDICINE

## 2021-03-26 ENCOUNTER — TELEPHONE (OUTPATIENT)
Dept: FAMILY MEDICINE | Facility: CLINIC | Age: 86
End: 2021-03-26

## 2021-03-26 ENCOUNTER — HOSPITAL ENCOUNTER (EMERGENCY)
Facility: HOSPITAL | Age: 86
Discharge: HOME | End: 2021-03-27
Attending: EMERGENCY MEDICINE
Payer: MEDICARE

## 2021-03-26 DIAGNOSIS — S90.32XA CONTUSION OF LEFT FOOT, INITIAL ENCOUNTER: ICD-10-CM
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
## 2021-03-26 DIAGNOSIS — S80.02XA CONTUSION OF LEFT KNEE, INITIAL ENCOUNTER: ICD-10-CM

## 2021-03-26 DIAGNOSIS — R07.81 RIB PAIN ON LEFT SIDE: Primary | ICD-10-CM
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
## 2021-03-26 PROCEDURE — 99284 EMERGENCY DEPT VISIT MOD MDM: CPT | Mod: 25

## 2021-03-26 PROCEDURE — 94799 UNLISTED PULMONARY SVC/PX: CPT

## 2021-03-26 PROCEDURE — 25000003 PHARM REV CODE 250: Performed by: EMERGENCY MEDICINE

## 2021-03-26 RX ORDER — ACETAMINOPHEN 325 MG/1
650 TABLET ORAL
Status: COMPLETED | OUTPATIENT
Start: 2021-03-26 | End: 2021-03-26

## 2021-03-26 RX ADMIN — ACETAMINOPHEN 650 MG: 325 TABLET ORAL at 09:03

## 2021-03-27 VITALS
RESPIRATION RATE: 18 BRPM | DIASTOLIC BLOOD PRESSURE: 51 MMHG | SYSTOLIC BLOOD PRESSURE: 105 MMHG | WEIGHT: 240 LBS | BODY MASS INDEX: 38.57 KG/M2 | TEMPERATURE: 98 F | OXYGEN SATURATION: 98 % | HEIGHT: 66 IN | HEART RATE: 82 BPM

## 2021-04-06 ENCOUNTER — DOCUMENT SCAN (OUTPATIENT)
Dept: HOME HEALTH SERVICES | Facility: HOSPITAL | Age: 86
End: 2021-04-06

## 2021-04-10 ENCOUNTER — IMMUNIZATION (OUTPATIENT)
Dept: PRIMARY CARE CLINIC | Facility: CLINIC | Age: 86
End: 2021-04-10

## 2021-04-10 DIAGNOSIS — Z23 NEED FOR VACCINATION: Primary | ICD-10-CM

## 2021-04-10 PROCEDURE — 91300 COVID-19, MRNA, LNP-S, PF, 30 MCG/0.3 ML DOSE VACCINE: CPT | Mod: S$GLB,,, | Performed by: FAMILY MEDICINE

## 2021-04-10 PROCEDURE — 0002A COVID-19, MRNA, LNP-S, PF, 30 MCG/0.3 ML DOSE VACCINE: ICD-10-PCS | Mod: CV19,S$GLB,, | Performed by: FAMILY MEDICINE

## 2021-04-10 PROCEDURE — 0002A COVID-19, MRNA, LNP-S, PF, 30 MCG/0.3 ML DOSE VACCINE: CPT | Mod: CV19,S$GLB,, | Performed by: FAMILY MEDICINE

## 2021-04-10 PROCEDURE — 91300 COVID-19, MRNA, LNP-S, PF, 30 MCG/0.3 ML DOSE VACCINE: ICD-10-PCS | Mod: S$GLB,,, | Performed by: FAMILY MEDICINE

## 2021-04-14 ENCOUNTER — TELEPHONE (OUTPATIENT)
Dept: FAMILY MEDICINE | Facility: CLINIC | Age: 86
End: 2021-04-14

## 2021-04-20 ENCOUNTER — EXTERNAL HOME HEALTH (OUTPATIENT)
Dept: HOME HEALTH SERVICES | Facility: HOSPITAL | Age: 86
End: 2021-04-20

## 2021-05-19 DIAGNOSIS — G62.9 NEUROPATHY: ICD-10-CM

## 2021-05-19 RX ORDER — GABAPENTIN 100 MG/1
100 CAPSULE ORAL DAILY
Qty: 90 CAPSULE | Refills: 1 | Status: SHIPPED | OUTPATIENT
Start: 2021-05-19 | End: 2021-11-23 | Stop reason: SDUPTHER

## 2021-05-20 ENCOUNTER — DOCUMENT SCAN (OUTPATIENT)
Dept: HOME HEALTH SERVICES | Facility: HOSPITAL | Age: 86
End: 2021-05-20

## 2021-07-02 ENCOUNTER — TELEPHONE (OUTPATIENT)
Dept: CARDIOLOGY | Facility: CLINIC | Age: 86
End: 2021-07-02

## 2021-07-02 DIAGNOSIS — K64.9 HEMORRHOIDS, UNSPECIFIED HEMORRHOID TYPE: ICD-10-CM

## 2021-07-02 RX ORDER — HYDROCORTISONE 25 MG/G
CREAM TOPICAL 2 TIMES DAILY
Qty: 28 G | Refills: 1 | Status: SHIPPED | OUTPATIENT
Start: 2021-07-02 | End: 2022-01-01

## 2021-08-26 DIAGNOSIS — E11.69 DM TYPE 2 WITH DIABETIC DYSLIPIDEMIA: ICD-10-CM

## 2021-08-26 DIAGNOSIS — E78.5 DYSLIPIDEMIA: ICD-10-CM

## 2021-08-26 DIAGNOSIS — E78.5 DM TYPE 2 WITH DIABETIC DYSLIPIDEMIA: ICD-10-CM

## 2021-08-26 DIAGNOSIS — I87.2 VENOUS INSUFFICIENCY OF BOTH LOWER EXTREMITIES: ICD-10-CM

## 2021-08-26 RX ORDER — ATORVASTATIN CALCIUM 40 MG/1
40 TABLET, FILM COATED ORAL NIGHTLY
Qty: 30 TABLET | Refills: 0 | Status: SHIPPED | OUTPATIENT
Start: 2021-08-26 | End: 2021-10-12 | Stop reason: SDUPTHER

## 2021-08-26 RX ORDER — POTASSIUM CHLORIDE 750 MG/1
10 TABLET, EXTENDED RELEASE ORAL DAILY
Qty: 30 TABLET | Refills: 0 | Status: SHIPPED | OUTPATIENT
Start: 2021-08-26 | End: 2021-10-04 | Stop reason: SDUPTHER

## 2021-08-26 RX ORDER — GLIPIZIDE 2.5 MG/1
2.5 TABLET, EXTENDED RELEASE ORAL
Qty: 30 TABLET | Refills: 0 | Status: SHIPPED | OUTPATIENT
Start: 2021-08-26 | End: 2021-10-04 | Stop reason: SDUPTHER

## 2021-09-05 ENCOUNTER — HOSPITAL ENCOUNTER (INPATIENT)
Facility: HOSPITAL | Age: 86
LOS: 3 days | Discharge: HOME HEALTH | DRG: 281 | End: 2021-09-08
Attending: EMERGENCY MEDICINE | Admitting: INTERNAL MEDICINE
Payer: MEDICARE

## 2021-09-05 DIAGNOSIS — R07.9 CHEST PAIN: ICD-10-CM

## 2021-09-05 DIAGNOSIS — R79.89 ELEVATED TROPONIN: Primary | ICD-10-CM

## 2021-09-05 DIAGNOSIS — N17.9 AKI (ACUTE KIDNEY INJURY): ICD-10-CM

## 2021-09-05 DIAGNOSIS — I10 ESSENTIAL HYPERTENSION: ICD-10-CM

## 2021-09-05 DIAGNOSIS — R41.82 ALTERED MENTAL STATUS: ICD-10-CM

## 2021-09-05 DIAGNOSIS — T14.90XA TRAUMA: ICD-10-CM

## 2021-09-05 DIAGNOSIS — I21.4 NSTEMI (NON-ST ELEVATED MYOCARDIAL INFARCTION): ICD-10-CM

## 2021-09-05 PROBLEM — E86.0 DEHYDRATION: Status: ACTIVE | Noted: 2021-09-05

## 2021-09-05 PROBLEM — R44.3 HALLUCINATIONS: Status: ACTIVE | Noted: 2021-09-05

## 2021-09-05 LAB
ALBUMIN SERPL BCP-MCNC: 3.3 G/DL (ref 3.5–5.2)
ALP SERPL-CCNC: 91 U/L (ref 55–135)
ALT SERPL W/O P-5'-P-CCNC: 23 U/L (ref 10–44)
AMPHET+METHAMPHET UR QL: NEGATIVE
ANION GAP SERPL CALC-SCNC: 11 MMOL/L (ref 8–16)
APTT PPP: 29.4 SEC (ref 25.6–35.8)
AST SERPL-CCNC: 43 U/L (ref 10–40)
BARBITURATES UR QL SCN>200 NG/ML: NEGATIVE
BASOPHILS # BLD AUTO: 0.04 K/UL (ref 0–0.2)
BASOPHILS NFR BLD: 0.4 % (ref 0–1.9)
BENZODIAZ UR QL SCN>200 NG/ML: NEGATIVE
BILIRUB SERPL-MCNC: 0.9 MG/DL (ref 0.1–1)
BILIRUB UR QL STRIP: NEGATIVE
BUN SERPL-MCNC: 40 MG/DL (ref 8–23)
BZE UR QL SCN: NEGATIVE
CALCIUM SERPL-MCNC: 8.3 MG/DL (ref 8.7–10.5)
CANNABINOIDS UR QL SCN: NEGATIVE
CHLORIDE SERPL-SCNC: 109 MMOL/L (ref 95–110)
CK MB SERPL-MCNC: 14.4 NG/ML (ref 0.1–6.5)
CK MB SERPL-MCNC: 8.1 NG/ML (ref 0.1–6.5)
CK SERPL-CCNC: 199 U/L (ref 20–180)
CK SERPL-CCNC: 349 U/L (ref 20–180)
CLARITY UR: ABNORMAL
CO2 SERPL-SCNC: 22 MMOL/L (ref 23–29)
COLOR UR: YELLOW
CREAT SERPL-MCNC: 1.5 MG/DL (ref 0.5–1.4)
CREAT UR-MCNC: 142 MG/DL (ref 15–325)
DIFFERENTIAL METHOD: ABNORMAL
EOSINOPHIL # BLD AUTO: 0.1 K/UL (ref 0–0.5)
EOSINOPHIL NFR BLD: 1 % (ref 0–8)
ERYTHROCYTE [DISTWIDTH] IN BLOOD BY AUTOMATED COUNT: 13.6 % (ref 11.5–14.5)
EST. GFR  (AFRICAN AMERICAN): 35.6 ML/MIN/1.73 M^2
EST. GFR  (NON AFRICAN AMERICAN): 30.9 ML/MIN/1.73 M^2
GLUCOSE SERPL-MCNC: 115 MG/DL (ref 70–110)
GLUCOSE SERPL-MCNC: 121 MG/DL (ref 70–110)
GLUCOSE UR QL STRIP: NEGATIVE
HCT VFR BLD AUTO: 29.2 % (ref 37–48.5)
HGB BLD-MCNC: 9.3 G/DL (ref 12–16)
HGB UR QL STRIP: NEGATIVE
IMM GRANULOCYTES # BLD AUTO: 0.03 K/UL (ref 0–0.04)
IMM GRANULOCYTES NFR BLD AUTO: 0.3 % (ref 0–0.5)
INR PPP: 1.7
KETONES UR QL STRIP: NEGATIVE
LACTATE SERPL-SCNC: 1.3 MMOL/L (ref 0.5–1.9)
LACTATE SERPL-SCNC: 2.1 MMOL/L (ref 0.5–1.9)
LEUKOCYTE ESTERASE UR QL STRIP: NEGATIVE
LIPASE SERPL-CCNC: 42 U/L (ref 4–60)
LYMPHOCYTES # BLD AUTO: 1.3 K/UL (ref 1–4.8)
LYMPHOCYTES NFR BLD: 12 % (ref 18–48)
MAGNESIUM SERPL-MCNC: 1.8 MG/DL (ref 1.6–2.6)
MCH RBC QN AUTO: 30 PG (ref 27–31)
MCHC RBC AUTO-ENTMCNC: 31.8 G/DL (ref 32–36)
MCV RBC AUTO: 94 FL (ref 82–98)
MONOCYTES # BLD AUTO: 0.8 K/UL (ref 0.3–1)
MONOCYTES NFR BLD: 7.5 % (ref 4–15)
NEUTROPHILS # BLD AUTO: 8.3 K/UL (ref 1.8–7.7)
NEUTROPHILS NFR BLD: 78.8 % (ref 38–73)
NITRITE UR QL STRIP: NEGATIVE
NRBC BLD-RTO: 0 /100 WBC
OPIATES UR QL SCN: NEGATIVE
PCP UR QL SCN>25 NG/ML: NEGATIVE
PH UR STRIP: 5 [PH] (ref 5–8)
PLATELET # BLD AUTO: 301 K/UL (ref 150–450)
PMV BLD AUTO: 10.6 FL (ref 9.2–12.9)
POTASSIUM SERPL-SCNC: 4.5 MMOL/L (ref 3.5–5.1)
PROT SERPL-MCNC: 6.9 G/DL (ref 6–8.4)
PROT UR QL STRIP: ABNORMAL
PROTHROMBIN TIME: 19.4 SEC (ref 11.8–14.3)
RBC # BLD AUTO: 3.1 M/UL (ref 4–5.4)
SARS-COV-2 RDRP RESP QL NAA+PROBE: NEGATIVE
SODIUM SERPL-SCNC: 142 MMOL/L (ref 136–145)
SP GR UR STRIP: 1.01 (ref 1–1.03)
TOXICOLOGY INFORMATION: NORMAL
TROPONIN I SERPL DL<=0.01 NG/ML-MCNC: 0.64 NG/ML
TROPONIN I SERPL DL<=0.01 NG/ML-MCNC: 1.37 NG/ML
TSH SERPL DL<=0.005 MIU/L-ACNC: 2.74 UIU/ML (ref 0.34–5.6)
URN SPEC COLLECT METH UR: ABNORMAL
UROBILINOGEN UR STRIP-ACNC: NEGATIVE EU/DL
WBC # BLD AUTO: 10.52 K/UL (ref 3.9–12.7)

## 2021-09-05 PROCEDURE — 83036 HEMOGLOBIN GLYCOSYLATED A1C: CPT | Performed by: NURSE PRACTITIONER

## 2021-09-05 PROCEDURE — 82553 CREATINE MB FRACTION: CPT | Performed by: NURSE PRACTITIONER

## 2021-09-05 PROCEDURE — 83690 ASSAY OF LIPASE: CPT | Performed by: EMERGENCY MEDICINE

## 2021-09-05 PROCEDURE — 85025 COMPLETE CBC W/AUTO DIFF WBC: CPT | Performed by: EMERGENCY MEDICINE

## 2021-09-05 PROCEDURE — 87040 BLOOD CULTURE FOR BACTERIA: CPT | Mod: 59 | Performed by: EMERGENCY MEDICINE

## 2021-09-05 PROCEDURE — 84484 ASSAY OF TROPONIN QUANT: CPT | Performed by: EMERGENCY MEDICINE

## 2021-09-05 PROCEDURE — 83605 ASSAY OF LACTIC ACID: CPT | Mod: 91 | Performed by: EMERGENCY MEDICINE

## 2021-09-05 PROCEDURE — 80053 COMPREHEN METABOLIC PANEL: CPT | Performed by: EMERGENCY MEDICINE

## 2021-09-05 PROCEDURE — 82962 GLUCOSE BLOOD TEST: CPT

## 2021-09-05 PROCEDURE — 82550 ASSAY OF CK (CPK): CPT | Mod: 91 | Performed by: EMERGENCY MEDICINE

## 2021-09-05 PROCEDURE — 93005 ELECTROCARDIOGRAM TRACING: CPT | Performed by: INTERNAL MEDICINE

## 2021-09-05 PROCEDURE — 93010 ELECTROCARDIOGRAM REPORT: CPT | Mod: 76,,, | Performed by: INTERNAL MEDICINE

## 2021-09-05 PROCEDURE — 81003 URINALYSIS AUTO W/O SCOPE: CPT | Mod: 59 | Performed by: EMERGENCY MEDICINE

## 2021-09-05 PROCEDURE — 82553 CREATINE MB FRACTION: CPT | Mod: 91 | Performed by: EMERGENCY MEDICINE

## 2021-09-05 PROCEDURE — 84443 ASSAY THYROID STIM HORMONE: CPT | Performed by: EMERGENCY MEDICINE

## 2021-09-05 PROCEDURE — 25000003 PHARM REV CODE 250: Performed by: EMERGENCY MEDICINE

## 2021-09-05 PROCEDURE — 82550 ASSAY OF CK (CPK): CPT | Performed by: NURSE PRACTITIONER

## 2021-09-05 PROCEDURE — 85730 THROMBOPLASTIN TIME PARTIAL: CPT | Performed by: EMERGENCY MEDICINE

## 2021-09-05 PROCEDURE — 85610 PROTHROMBIN TIME: CPT | Performed by: EMERGENCY MEDICINE

## 2021-09-05 PROCEDURE — 25000003 PHARM REV CODE 250: Performed by: INTERNAL MEDICINE

## 2021-09-05 PROCEDURE — 93010 EKG 12-LEAD: ICD-10-PCS | Mod: ,,, | Performed by: INTERNAL MEDICINE

## 2021-09-05 PROCEDURE — 36415 COLL VENOUS BLD VENIPUNCTURE: CPT | Performed by: EMERGENCY MEDICINE

## 2021-09-05 PROCEDURE — 21000000 HC CCU ICU ROOM CHARGE

## 2021-09-05 PROCEDURE — U0002 COVID-19 LAB TEST NON-CDC: HCPCS | Performed by: EMERGENCY MEDICINE

## 2021-09-05 PROCEDURE — 93010 ELECTROCARDIOGRAM REPORT: CPT | Mod: ,,, | Performed by: INTERNAL MEDICINE

## 2021-09-05 PROCEDURE — 80307 DRUG TEST PRSMV CHEM ANLYZR: CPT | Performed by: EMERGENCY MEDICINE

## 2021-09-05 PROCEDURE — 99285 EMERGENCY DEPT VISIT HI MDM: CPT

## 2021-09-05 PROCEDURE — 83735 ASSAY OF MAGNESIUM: CPT | Performed by: EMERGENCY MEDICINE

## 2021-09-05 RX ORDER — HYDROCORTISONE 25 MG/G
CREAM TOPICAL 2 TIMES DAILY
Status: DISCONTINUED | OUTPATIENT
Start: 2021-09-05 | End: 2021-09-08 | Stop reason: HOSPADM

## 2021-09-05 RX ORDER — FLECAINIDE ACETATE 50 MG/1
50 TABLET ORAL EVERY 12 HOURS
Status: DISCONTINUED | OUTPATIENT
Start: 2021-09-05 | End: 2021-09-08 | Stop reason: HOSPADM

## 2021-09-05 RX ORDER — SODIUM CHLORIDE 9 MG/ML
INJECTION, SOLUTION INTRAVENOUS
Status: COMPLETED | OUTPATIENT
Start: 2021-09-05 | End: 2021-09-05

## 2021-09-05 RX ORDER — AMLODIPINE BESYLATE 5 MG/1
5 TABLET ORAL NIGHTLY
Status: DISCONTINUED | OUTPATIENT
Start: 2021-09-05 | End: 2021-09-08 | Stop reason: HOSPADM

## 2021-09-05 RX ORDER — NITROGLYCERIN 20 MG/100ML
0-400 INJECTION INTRAVENOUS CONTINUOUS
Status: DISCONTINUED | OUTPATIENT
Start: 2021-09-06 | End: 2021-09-06

## 2021-09-05 RX ORDER — POTASSIUM CHLORIDE 750 MG/1
10 CAPSULE, EXTENDED RELEASE ORAL DAILY
Status: DISCONTINUED | OUTPATIENT
Start: 2021-09-06 | End: 2021-09-08 | Stop reason: HOSPADM

## 2021-09-05 RX ORDER — ATORVASTATIN CALCIUM 40 MG/1
40 TABLET, FILM COATED ORAL NIGHTLY
Status: DISCONTINUED | OUTPATIENT
Start: 2021-09-05 | End: 2021-09-08 | Stop reason: HOSPADM

## 2021-09-05 RX ORDER — MORPHINE SULFATE 2 MG/ML
2 INJECTION, SOLUTION INTRAMUSCULAR; INTRAVENOUS EVERY 4 HOURS PRN
Status: DISCONTINUED | OUTPATIENT
Start: 2021-09-05 | End: 2021-09-08 | Stop reason: HOSPADM

## 2021-09-05 RX ADMIN — SODIUM CHLORIDE: 0.9 INJECTION, SOLUTION INTRAVENOUS at 07:09

## 2021-09-05 RX ADMIN — NITROGLYCERIN 1 INCH: 20 OINTMENT TOPICAL at 10:09

## 2021-09-06 LAB
ALBUMIN SERPL BCP-MCNC: 3 G/DL (ref 3.5–5.2)
ALP SERPL-CCNC: 85 U/L (ref 55–135)
ALT SERPL W/O P-5'-P-CCNC: 22 U/L (ref 10–44)
ANION GAP SERPL CALC-SCNC: 13 MMOL/L (ref 8–16)
AST SERPL-CCNC: 35 U/L (ref 10–40)
BASOPHILS # BLD AUTO: 0.07 K/UL (ref 0–0.2)
BASOPHILS NFR BLD: 0.7 % (ref 0–1.9)
BILIRUB SERPL-MCNC: 0.9 MG/DL (ref 0.1–1)
BUN SERPL-MCNC: 30 MG/DL (ref 8–23)
CALCIUM SERPL-MCNC: 8.5 MG/DL (ref 8.7–10.5)
CHLORIDE SERPL-SCNC: 106 MMOL/L (ref 95–110)
CK MB SERPL-MCNC: 10.6 NG/ML (ref 0.1–6.5)
CK MB SERPL-MCNC: 7.8 NG/ML (ref 0.1–6.5)
CK SERPL-CCNC: 217 U/L (ref 20–180)
CK SERPL-CCNC: 283 U/L (ref 20–180)
CO2 SERPL-SCNC: 24 MMOL/L (ref 23–29)
CREAT SERPL-MCNC: 1 MG/DL (ref 0.5–1.4)
DIFFERENTIAL METHOD: ABNORMAL
EOSINOPHIL # BLD AUTO: 0.2 K/UL (ref 0–0.5)
EOSINOPHIL NFR BLD: 2.2 % (ref 0–8)
ERYTHROCYTE [DISTWIDTH] IN BLOOD BY AUTOMATED COUNT: 13.7 % (ref 11.5–14.5)
EST. GFR  (AFRICAN AMERICAN): 58.1 ML/MIN/1.73 M^2
EST. GFR  (NON AFRICAN AMERICAN): 50.4 ML/MIN/1.73 M^2
GLUCOSE SERPL-MCNC: 149 MG/DL (ref 70–110)
GLUCOSE SERPL-MCNC: 155 MG/DL (ref 70–110)
GLUCOSE SERPL-MCNC: 96 MG/DL (ref 70–110)
GLUCOSE SERPL-MCNC: 97 MG/DL (ref 70–110)
HCT VFR BLD AUTO: 32.4 % (ref 37–48.5)
HGB BLD-MCNC: 10.3 G/DL (ref 12–16)
IMM GRANULOCYTES # BLD AUTO: 0.03 K/UL (ref 0–0.04)
IMM GRANULOCYTES NFR BLD AUTO: 0.3 % (ref 0–0.5)
LYMPHOCYTES # BLD AUTO: 2.1 K/UL (ref 1–4.8)
LYMPHOCYTES NFR BLD: 20.7 % (ref 18–48)
MCH RBC QN AUTO: 30.4 PG (ref 27–31)
MCHC RBC AUTO-ENTMCNC: 31.8 G/DL (ref 32–36)
MCV RBC AUTO: 96 FL (ref 82–98)
MONOCYTES # BLD AUTO: 0.9 K/UL (ref 0.3–1)
MONOCYTES NFR BLD: 9.4 % (ref 4–15)
NEUTROPHILS # BLD AUTO: 6.6 K/UL (ref 1.8–7.7)
NEUTROPHILS NFR BLD: 66.7 % (ref 38–73)
NRBC BLD-RTO: 0 /100 WBC
PLATELET # BLD AUTO: 263 K/UL (ref 150–450)
PMV BLD AUTO: 10.7 FL (ref 9.2–12.9)
POTASSIUM SERPL-SCNC: 4.2 MMOL/L (ref 3.5–5.1)
PROT SERPL-MCNC: 6.3 G/DL (ref 6–8.4)
RBC # BLD AUTO: 3.39 M/UL (ref 4–5.4)
SODIUM SERPL-SCNC: 143 MMOL/L (ref 136–145)
TROPONIN I SERPL DL<=0.01 NG/ML-MCNC: 0.72 NG/ML
TROPONIN I SERPL DL<=0.01 NG/ML-MCNC: 0.84 NG/ML
WBC # BLD AUTO: 9.95 K/UL (ref 3.9–12.7)

## 2021-09-06 PROCEDURE — 25000003 PHARM REV CODE 250: Performed by: NURSE PRACTITIONER

## 2021-09-06 PROCEDURE — 82553 CREATINE MB FRACTION: CPT | Mod: 91 | Performed by: NURSE PRACTITIONER

## 2021-09-06 PROCEDURE — 21000000 HC CCU ICU ROOM CHARGE

## 2021-09-06 PROCEDURE — 94761 N-INVAS EAR/PLS OXIMETRY MLT: CPT

## 2021-09-06 PROCEDURE — 82550 ASSAY OF CK (CPK): CPT | Performed by: NURSE PRACTITIONER

## 2021-09-06 PROCEDURE — 85025 COMPLETE CBC W/AUTO DIFF WBC: CPT | Performed by: NURSE PRACTITIONER

## 2021-09-06 PROCEDURE — 84484 ASSAY OF TROPONIN QUANT: CPT | Performed by: NURSE PRACTITIONER

## 2021-09-06 PROCEDURE — 99900035 HC TECH TIME PER 15 MIN (STAT)

## 2021-09-06 PROCEDURE — 80053 COMPREHEN METABOLIC PANEL: CPT | Performed by: NURSE PRACTITIONER

## 2021-09-06 PROCEDURE — 99223 PR INITIAL HOSPITAL CARE,LEVL III: ICD-10-PCS | Mod: ,,, | Performed by: INTERNAL MEDICINE

## 2021-09-06 PROCEDURE — 36415 COLL VENOUS BLD VENIPUNCTURE: CPT | Performed by: NURSE PRACTITIONER

## 2021-09-06 PROCEDURE — 27000221 HC OXYGEN, UP TO 24 HOURS

## 2021-09-06 PROCEDURE — 25000003 PHARM REV CODE 250: Performed by: INTERNAL MEDICINE

## 2021-09-06 PROCEDURE — 99223 1ST HOSP IP/OBS HIGH 75: CPT | Mod: ,,, | Performed by: INTERNAL MEDICINE

## 2021-09-06 PROCEDURE — 92610 EVALUATE SWALLOWING FUNCTION: CPT

## 2021-09-06 RX ORDER — INSULIN ASPART 100 [IU]/ML
0-5 INJECTION, SOLUTION INTRAVENOUS; SUBCUTANEOUS EVERY 6 HOURS PRN
Status: DISCONTINUED | OUTPATIENT
Start: 2021-09-06 | End: 2021-09-08 | Stop reason: HOSPADM

## 2021-09-06 RX ORDER — GLUCAGON 1 MG
1 KIT INJECTION
Status: DISCONTINUED | OUTPATIENT
Start: 2021-09-06 | End: 2021-09-08 | Stop reason: HOSPADM

## 2021-09-06 RX ADMIN — APIXABAN 5 MG: 5 TABLET, FILM COATED ORAL at 09:09

## 2021-09-06 RX ADMIN — POTASSIUM CHLORIDE 10 MEQ: 750 CAPSULE, EXTENDED RELEASE ORAL at 08:09

## 2021-09-06 RX ADMIN — AMLODIPINE BESYLATE 5 MG: 5 TABLET ORAL at 09:09

## 2021-09-06 RX ADMIN — APIXABAN 5 MG: 5 TABLET, FILM COATED ORAL at 08:09

## 2021-09-06 RX ADMIN — ATORVASTATIN CALCIUM 40 MG: 40 TABLET, FILM COATED ORAL at 12:09

## 2021-09-06 RX ADMIN — FLECAINIDE ACETATE 50 MG: 50 TABLET ORAL at 09:09

## 2021-09-06 RX ADMIN — AMLODIPINE BESYLATE 5 MG: 5 TABLET ORAL at 12:09

## 2021-09-06 RX ADMIN — METOPROLOL SUCCINATE 12.5 MG: 25 TABLET, EXTENDED RELEASE ORAL at 08:09

## 2021-09-06 RX ADMIN — ATORVASTATIN CALCIUM 40 MG: 40 TABLET, FILM COATED ORAL at 09:09

## 2021-09-06 RX ADMIN — NITROGLYCERIN 5 MCG/MIN: 20 INJECTION INTRAVENOUS at 12:09

## 2021-09-06 RX ADMIN — FLECAINIDE ACETATE 50 MG: 50 TABLET ORAL at 08:09

## 2021-09-06 RX ADMIN — APIXABAN 5 MG: 5 TABLET, FILM COATED ORAL at 12:09

## 2021-09-07 LAB
ALBUMIN SERPL BCP-MCNC: 3 G/DL (ref 3.5–5.2)
ALP SERPL-CCNC: 86 U/L (ref 55–135)
ALT SERPL W/O P-5'-P-CCNC: 22 U/L (ref 10–44)
ANION GAP SERPL CALC-SCNC: 8 MMOL/L (ref 8–16)
AST SERPL-CCNC: 36 U/L (ref 10–40)
BASOPHILS # BLD AUTO: 0.06 K/UL (ref 0–0.2)
BASOPHILS NFR BLD: 0.7 % (ref 0–1.9)
BILIRUB SERPL-MCNC: 1 MG/DL (ref 0.1–1)
BUN SERPL-MCNC: 20 MG/DL (ref 8–23)
CALCIUM SERPL-MCNC: 8.4 MG/DL (ref 8.7–10.5)
CHLORIDE SERPL-SCNC: 110 MMOL/L (ref 95–110)
CO2 SERPL-SCNC: 25 MMOL/L (ref 23–29)
CREAT SERPL-MCNC: 0.8 MG/DL (ref 0.5–1.4)
DIFFERENTIAL METHOD: ABNORMAL
EOSINOPHIL # BLD AUTO: 0.2 K/UL (ref 0–0.5)
EOSINOPHIL NFR BLD: 1.9 % (ref 0–8)
ERYTHROCYTE [DISTWIDTH] IN BLOOD BY AUTOMATED COUNT: 13.7 % (ref 11.5–14.5)
EST. GFR  (AFRICAN AMERICAN): >60 ML/MIN/1.73 M^2
EST. GFR  (NON AFRICAN AMERICAN): >60 ML/MIN/1.73 M^2
ESTIMATED AVG GLUCOSE: 131 MG/DL (ref 68–131)
GLUCOSE SERPL-MCNC: 123 MG/DL (ref 70–110)
GLUCOSE SERPL-MCNC: 130 MG/DL (ref 70–110)
GLUCOSE SERPL-MCNC: 141 MG/DL (ref 70–110)
GLUCOSE SERPL-MCNC: 156 MG/DL (ref 70–110)
GLUCOSE SERPL-MCNC: 175 MG/DL (ref 70–110)
HBA1C MFR BLD: 6.2 % (ref 4.5–6.2)
HCT VFR BLD AUTO: 33.3 % (ref 37–48.5)
HGB BLD-MCNC: 10.7 G/DL (ref 12–16)
IMM GRANULOCYTES # BLD AUTO: 0.04 K/UL (ref 0–0.04)
IMM GRANULOCYTES NFR BLD AUTO: 0.5 % (ref 0–0.5)
LYMPHOCYTES # BLD AUTO: 1.8 K/UL (ref 1–4.8)
LYMPHOCYTES NFR BLD: 21.2 % (ref 18–48)
MAGNESIUM SERPL-MCNC: 1.7 MG/DL (ref 1.6–2.6)
MCH RBC QN AUTO: 30.2 PG (ref 27–31)
MCHC RBC AUTO-ENTMCNC: 32.1 G/DL (ref 32–36)
MCV RBC AUTO: 94 FL (ref 82–98)
MONOCYTES # BLD AUTO: 0.7 K/UL (ref 0.3–1)
MONOCYTES NFR BLD: 8.6 % (ref 4–15)
NEUTROPHILS # BLD AUTO: 5.8 K/UL (ref 1.8–7.7)
NEUTROPHILS NFR BLD: 67.1 % (ref 38–73)
NRBC BLD-RTO: 0 /100 WBC
PLATELET # BLD AUTO: 308 K/UL (ref 150–450)
PMV BLD AUTO: 10.4 FL (ref 9.2–12.9)
POTASSIUM SERPL-SCNC: 4.6 MMOL/L (ref 3.5–5.1)
PROT SERPL-MCNC: 6.4 G/DL (ref 6–8.4)
RBC # BLD AUTO: 3.54 M/UL (ref 4–5.4)
SODIUM SERPL-SCNC: 143 MMOL/L (ref 136–145)
WBC # BLD AUTO: 8.59 K/UL (ref 3.9–12.7)

## 2021-09-07 PROCEDURE — 80053 COMPREHEN METABOLIC PANEL: CPT | Performed by: NURSE PRACTITIONER

## 2021-09-07 PROCEDURE — 99233 SBSQ HOSP IP/OBS HIGH 50: CPT | Mod: ,,, | Performed by: INTERNAL MEDICINE

## 2021-09-07 PROCEDURE — 99233 PR SUBSEQUENT HOSPITAL CARE,LEVL III: ICD-10-PCS | Mod: ,,, | Performed by: INTERNAL MEDICINE

## 2021-09-07 PROCEDURE — 99900035 HC TECH TIME PER 15 MIN (STAT)

## 2021-09-07 PROCEDURE — 25000003 PHARM REV CODE 250: Performed by: STUDENT IN AN ORGANIZED HEALTH CARE EDUCATION/TRAINING PROGRAM

## 2021-09-07 PROCEDURE — 25000003 PHARM REV CODE 250: Performed by: NURSE PRACTITIONER

## 2021-09-07 PROCEDURE — 97166 OT EVAL MOD COMPLEX 45 MIN: CPT

## 2021-09-07 PROCEDURE — 63600175 PHARM REV CODE 636 W HCPCS: Performed by: NURSE PRACTITIONER

## 2021-09-07 PROCEDURE — 82962 GLUCOSE BLOOD TEST: CPT

## 2021-09-07 PROCEDURE — 94799 UNLISTED PULMONARY SVC/PX: CPT

## 2021-09-07 PROCEDURE — 85025 COMPLETE CBC W/AUTO DIFF WBC: CPT | Performed by: NURSE PRACTITIONER

## 2021-09-07 PROCEDURE — 94761 N-INVAS EAR/PLS OXIMETRY MLT: CPT

## 2021-09-07 PROCEDURE — 92507 TX SP LANG VOICE COMM INDIV: CPT

## 2021-09-07 PROCEDURE — 83735 ASSAY OF MAGNESIUM: CPT | Performed by: STUDENT IN AN ORGANIZED HEALTH CARE EDUCATION/TRAINING PROGRAM

## 2021-09-07 PROCEDURE — 36415 COLL VENOUS BLD VENIPUNCTURE: CPT | Performed by: NURSE PRACTITIONER

## 2021-09-07 PROCEDURE — 21000000 HC CCU ICU ROOM CHARGE

## 2021-09-07 PROCEDURE — 97535 SELF CARE MNGMENT TRAINING: CPT

## 2021-09-07 RX ORDER — BALSAM PERU/CASTOR OIL
OINTMENT (GRAM) TOPICAL DAILY
Status: DISCONTINUED | OUTPATIENT
Start: 2021-09-07 | End: 2021-09-08 | Stop reason: HOSPADM

## 2021-09-07 RX ORDER — PANTOPRAZOLE SODIUM 40 MG/1
40 TABLET, DELAYED RELEASE ORAL DAILY
Status: DISCONTINUED | OUTPATIENT
Start: 2021-09-07 | End: 2021-09-08 | Stop reason: HOSPADM

## 2021-09-07 RX ORDER — LANOLIN ALCOHOL/MO/W.PET/CERES
400 CREAM (GRAM) TOPICAL DAILY
Status: DISCONTINUED | OUTPATIENT
Start: 2021-09-08 | End: 2021-09-08 | Stop reason: HOSPADM

## 2021-09-07 RX ORDER — MAGNESIUM SULFATE HEPTAHYDRATE 40 MG/ML
2 INJECTION, SOLUTION INTRAVENOUS ONCE
Status: COMPLETED | OUTPATIENT
Start: 2021-09-07 | End: 2021-09-07

## 2021-09-07 RX ADMIN — APIXABAN 5 MG: 5 TABLET, FILM COATED ORAL at 09:09

## 2021-09-07 RX ADMIN — MAGNESIUM SULFATE IN WATER 2 G: 40 INJECTION, SOLUTION INTRAVENOUS at 02:09

## 2021-09-07 RX ADMIN — APIXABAN 5 MG: 5 TABLET, FILM COATED ORAL at 10:09

## 2021-09-07 RX ADMIN — METOPROLOL SUCCINATE 12.5 MG: 25 TABLET, EXTENDED RELEASE ORAL at 09:09

## 2021-09-07 RX ADMIN — ATORVASTATIN CALCIUM 40 MG: 40 TABLET, FILM COATED ORAL at 10:09

## 2021-09-07 RX ADMIN — POTASSIUM CHLORIDE 10 MEQ: 750 CAPSULE, EXTENDED RELEASE ORAL at 09:09

## 2021-09-07 RX ADMIN — HYDROCORTISONE: 25 CREAM TOPICAL at 10:09

## 2021-09-07 RX ADMIN — PANTOPRAZOLE SODIUM 40 MG: 40 TABLET, DELAYED RELEASE ORAL at 03:09

## 2021-09-07 RX ADMIN — HYDROCORTISONE: 25 CREAM TOPICAL at 09:09

## 2021-09-07 RX ADMIN — AMLODIPINE BESYLATE 5 MG: 5 TABLET ORAL at 10:09

## 2021-09-07 RX ADMIN — FLECAINIDE ACETATE 50 MG: 50 TABLET ORAL at 10:09

## 2021-09-07 RX ADMIN — FLECAINIDE ACETATE 50 MG: 50 TABLET ORAL at 09:09

## 2021-09-08 VITALS
TEMPERATURE: 98 F | BODY MASS INDEX: 31.94 KG/M2 | OXYGEN SATURATION: 97 % | HEART RATE: 65 BPM | DIASTOLIC BLOOD PRESSURE: 61 MMHG | RESPIRATION RATE: 20 BRPM | HEIGHT: 67 IN | WEIGHT: 203.5 LBS | SYSTOLIC BLOOD PRESSURE: 147 MMHG

## 2021-09-08 PROBLEM — R44.3 HALLUCINATIONS: Status: RESOLVED | Noted: 2021-09-05 | Resolved: 2021-09-08

## 2021-09-08 PROBLEM — E86.0 DEHYDRATION: Status: RESOLVED | Noted: 2021-09-05 | Resolved: 2021-09-08

## 2021-09-08 PROBLEM — N17.9 AKI (ACUTE KIDNEY INJURY): Status: RESOLVED | Noted: 2021-09-05 | Resolved: 2021-09-08

## 2021-09-08 LAB
ALBUMIN SERPL BCP-MCNC: 2.9 G/DL (ref 3.5–5.2)
ALP SERPL-CCNC: 90 U/L (ref 55–135)
ALT SERPL W/O P-5'-P-CCNC: 23 U/L (ref 10–44)
ANION GAP SERPL CALC-SCNC: 11 MMOL/L (ref 8–16)
AST SERPL-CCNC: 42 U/L (ref 10–40)
BASOPHILS # BLD AUTO: 0.06 K/UL (ref 0–0.2)
BASOPHILS NFR BLD: 0.8 % (ref 0–1.9)
BILIRUB SERPL-MCNC: 0.8 MG/DL (ref 0.1–1)
BUN SERPL-MCNC: 16 MG/DL (ref 8–23)
CALCIUM SERPL-MCNC: 8.6 MG/DL (ref 8.7–10.5)
CHLORIDE SERPL-SCNC: 111 MMOL/L (ref 95–110)
CO2 SERPL-SCNC: 22 MMOL/L (ref 23–29)
CREAT SERPL-MCNC: 0.8 MG/DL (ref 0.5–1.4)
DIFFERENTIAL METHOD: ABNORMAL
EOSINOPHIL # BLD AUTO: 0.2 K/UL (ref 0–0.5)
EOSINOPHIL NFR BLD: 2.6 % (ref 0–8)
ERYTHROCYTE [DISTWIDTH] IN BLOOD BY AUTOMATED COUNT: 13.7 % (ref 11.5–14.5)
EST. GFR  (AFRICAN AMERICAN): >60 ML/MIN/1.73 M^2
EST. GFR  (NON AFRICAN AMERICAN): >60 ML/MIN/1.73 M^2
GLUCOSE SERPL-MCNC: 130 MG/DL (ref 70–110)
GLUCOSE SERPL-MCNC: 133 MG/DL (ref 70–110)
GLUCOSE SERPL-MCNC: 140 MG/DL (ref 70–110)
HCT VFR BLD AUTO: 34.6 % (ref 37–48.5)
HGB BLD-MCNC: 10.9 G/DL (ref 12–16)
IMM GRANULOCYTES # BLD AUTO: 0.02 K/UL (ref 0–0.04)
IMM GRANULOCYTES NFR BLD AUTO: 0.3 % (ref 0–0.5)
LYMPHOCYTES # BLD AUTO: 1.5 K/UL (ref 1–4.8)
LYMPHOCYTES NFR BLD: 20.1 % (ref 18–48)
MAGNESIUM SERPL-MCNC: 2.2 MG/DL (ref 1.6–2.6)
MCH RBC QN AUTO: 30.1 PG (ref 27–31)
MCHC RBC AUTO-ENTMCNC: 31.5 G/DL (ref 32–36)
MCV RBC AUTO: 96 FL (ref 82–98)
MONOCYTES # BLD AUTO: 0.8 K/UL (ref 0.3–1)
MONOCYTES NFR BLD: 11.1 % (ref 4–15)
NEUTROPHILS # BLD AUTO: 4.9 K/UL (ref 1.8–7.7)
NEUTROPHILS NFR BLD: 65.1 % (ref 38–73)
NRBC BLD-RTO: 0 /100 WBC
PLATELET # BLD AUTO: 306 K/UL (ref 150–450)
PMV BLD AUTO: 10.6 FL (ref 9.2–12.9)
POTASSIUM SERPL-SCNC: 4.3 MMOL/L (ref 3.5–5.1)
PROT SERPL-MCNC: 6.4 G/DL (ref 6–8.4)
RBC # BLD AUTO: 3.62 M/UL (ref 4–5.4)
SODIUM SERPL-SCNC: 144 MMOL/L (ref 136–145)
WBC # BLD AUTO: 7.58 K/UL (ref 3.9–12.7)

## 2021-09-08 PROCEDURE — 25000003 PHARM REV CODE 250: Performed by: STUDENT IN AN ORGANIZED HEALTH CARE EDUCATION/TRAINING PROGRAM

## 2021-09-08 PROCEDURE — 99233 SBSQ HOSP IP/OBS HIGH 50: CPT | Mod: ,,, | Performed by: INTERNAL MEDICINE

## 2021-09-08 PROCEDURE — 80053 COMPREHEN METABOLIC PANEL: CPT | Performed by: NURSE PRACTITIONER

## 2021-09-08 PROCEDURE — 25000003 PHARM REV CODE 250: Performed by: NURSE PRACTITIONER

## 2021-09-08 PROCEDURE — 85025 COMPLETE CBC W/AUTO DIFF WBC: CPT | Performed by: NURSE PRACTITIONER

## 2021-09-08 PROCEDURE — 99233 PR SUBSEQUENT HOSPITAL CARE,LEVL III: ICD-10-PCS | Mod: ,,, | Performed by: INTERNAL MEDICINE

## 2021-09-08 PROCEDURE — 83735 ASSAY OF MAGNESIUM: CPT | Performed by: STUDENT IN AN ORGANIZED HEALTH CARE EDUCATION/TRAINING PROGRAM

## 2021-09-08 PROCEDURE — 36415 COLL VENOUS BLD VENIPUNCTURE: CPT | Performed by: NURSE PRACTITIONER

## 2021-09-08 RX ORDER — PANTOPRAZOLE SODIUM 40 MG/1
40 TABLET, DELAYED RELEASE ORAL DAILY
Qty: 90 TABLET | Refills: 0 | Status: SHIPPED | OUTPATIENT
Start: 2021-09-09 | End: 2021-11-24 | Stop reason: SDUPTHER

## 2021-09-08 RX ORDER — AMLODIPINE BESYLATE 5 MG/1
5 TABLET ORAL NIGHTLY
Qty: 90 TABLET | Refills: 0 | Status: SHIPPED | OUTPATIENT
Start: 2021-09-08 | End: 2021-11-23 | Stop reason: SDUPTHER

## 2021-09-08 RX ADMIN — POTASSIUM CHLORIDE 10 MEQ: 750 CAPSULE, EXTENDED RELEASE ORAL at 09:09

## 2021-09-08 RX ADMIN — CASTOR OIL AND BALSAM, PERU: 788; 87 OINTMENT TOPICAL at 09:09

## 2021-09-08 RX ADMIN — FLECAINIDE ACETATE 50 MG: 50 TABLET ORAL at 09:09

## 2021-09-08 RX ADMIN — HYDROCORTISONE: 25 CREAM TOPICAL at 09:09

## 2021-09-08 RX ADMIN — PANTOPRAZOLE SODIUM 40 MG: 40 TABLET, DELAYED RELEASE ORAL at 06:09

## 2021-09-08 RX ADMIN — APIXABAN 5 MG: 5 TABLET, FILM COATED ORAL at 09:09

## 2021-09-08 RX ADMIN — MAGNESIUM OXIDE 400 MG: 400 TABLET ORAL at 09:09

## 2021-09-08 RX ADMIN — METOPROLOL SUCCINATE 12.5 MG: 25 TABLET, EXTENDED RELEASE ORAL at 09:09

## 2021-09-09 ENCOUNTER — TELEPHONE (OUTPATIENT)
Dept: FAMILY MEDICINE | Facility: CLINIC | Age: 86
End: 2021-09-09

## 2021-09-09 ENCOUNTER — PATIENT OUTREACH (OUTPATIENT)
Dept: FAMILY MEDICINE | Facility: CLINIC | Age: 86
End: 2021-09-09

## 2021-09-10 LAB
BACTERIA BLD CULT: NORMAL
BACTERIA BLD CULT: NORMAL

## 2021-09-14 ENCOUNTER — TELEPHONE (OUTPATIENT)
Dept: FAMILY MEDICINE | Facility: CLINIC | Age: 86
End: 2021-09-14

## 2021-09-15 ENCOUNTER — TELEPHONE (OUTPATIENT)
Dept: FAMILY MEDICINE | Facility: CLINIC | Age: 86
End: 2021-09-15

## 2021-09-15 DIAGNOSIS — Z79.899 ENCOUNTER FOR LONG-TERM (CURRENT) USE OF OTHER MEDICATIONS: Primary | ICD-10-CM

## 2021-09-21 ENCOUNTER — TELEPHONE (OUTPATIENT)
Dept: FAMILY MEDICINE | Facility: CLINIC | Age: 86
End: 2021-09-21

## 2021-09-22 ENCOUNTER — EXTERNAL HOME HEALTH (OUTPATIENT)
Dept: HOME HEALTH SERVICES | Facility: HOSPITAL | Age: 86
End: 2021-09-22

## 2021-09-22 ENCOUNTER — TELEPHONE (OUTPATIENT)
Dept: FAMILY MEDICINE | Facility: CLINIC | Age: 86
End: 2021-09-22

## 2021-09-22 ENCOUNTER — OFFICE VISIT (OUTPATIENT)
Dept: FAMILY MEDICINE | Facility: CLINIC | Age: 86
End: 2021-09-22
Payer: MEDICARE

## 2021-09-22 VITALS
BODY MASS INDEX: 32.18 KG/M2 | DIASTOLIC BLOOD PRESSURE: 60 MMHG | WEIGHT: 205 LBS | SYSTOLIC BLOOD PRESSURE: 130 MMHG | HEART RATE: 70 BPM | HEIGHT: 67 IN

## 2021-09-22 DIAGNOSIS — I48.0 PAF (PAROXYSMAL ATRIAL FIBRILLATION): ICD-10-CM

## 2021-09-22 DIAGNOSIS — Z09 HOSPITAL DISCHARGE FOLLOW-UP: Primary | ICD-10-CM

## 2021-09-22 DIAGNOSIS — E11.69 DM TYPE 2 WITH DIABETIC DYSLIPIDEMIA: ICD-10-CM

## 2021-09-22 DIAGNOSIS — E78.5 DM TYPE 2 WITH DIABETIC DYSLIPIDEMIA: ICD-10-CM

## 2021-09-22 DIAGNOSIS — Z23 NEED FOR INFLUENZA VACCINATION: ICD-10-CM

## 2021-09-22 DIAGNOSIS — K64.9 HEMORRHOIDS, UNSPECIFIED HEMORRHOID TYPE: ICD-10-CM

## 2021-09-22 DIAGNOSIS — I21.4 NSTEMI (NON-ST ELEVATED MYOCARDIAL INFARCTION): ICD-10-CM

## 2021-09-22 DIAGNOSIS — R19.7 DIARRHEA, UNSPECIFIED TYPE: ICD-10-CM

## 2021-09-22 PROCEDURE — G0008 FLU VACCINE - QUADRIVALENT - HIGH DOSE (65+) PRESERVATIVE FREE IM: ICD-10-PCS | Mod: S$GLB,,, | Performed by: NURSE PRACTITIONER

## 2021-09-22 PROCEDURE — 90662 IIV NO PRSV INCREASED AG IM: CPT | Mod: S$GLB,,, | Performed by: NURSE PRACTITIONER

## 2021-09-22 PROCEDURE — 90662 FLU VACCINE - QUADRIVALENT - HIGH DOSE (65+) PRESERVATIVE FREE IM: ICD-10-PCS | Mod: S$GLB,,, | Performed by: NURSE PRACTITIONER

## 2021-09-22 PROCEDURE — 99495 TCM SERVICES (MODERATE COMPLEXITY): ICD-10-PCS | Mod: 25,S$GLB,, | Performed by: NURSE PRACTITIONER

## 2021-09-22 PROCEDURE — 99495 TRANSJ CARE MGMT MOD F2F 14D: CPT | Mod: 25,S$GLB,, | Performed by: NURSE PRACTITIONER

## 2021-09-22 PROCEDURE — G0008 ADMIN INFLUENZA VIRUS VAC: HCPCS | Mod: S$GLB,,, | Performed by: NURSE PRACTITIONER

## 2021-09-22 RX ORDER — HYDROCORTISONE 25 MG/G
CREAM TOPICAL 2 TIMES DAILY
Qty: 28 G | Refills: 2 | Status: SHIPPED | OUTPATIENT
Start: 2021-09-22 | End: 2022-03-29 | Stop reason: SDUPTHER

## 2021-09-22 RX ORDER — LOPERAMIDE HCL 2 MG
2 TABLET ORAL 4 TIMES DAILY PRN
Qty: 20 TABLET | Refills: 1 | Status: SHIPPED | OUTPATIENT
Start: 2021-09-22 | End: 2021-10-02

## 2021-09-28 NOTE — TELEPHONE ENCOUNTER
LMOR that lab is due to recheck blood counts and this is a 4 week f/u from last labs. Informed her order is at [facility] and to call with any questions. Also mentioned she doesn't need to be fasting. Order pended. Updated remind me.   supple/no JVD

## 2021-09-30 ENCOUNTER — DOCUMENT SCAN (OUTPATIENT)
Dept: HOME HEALTH SERVICES | Facility: HOSPITAL | Age: 86
End: 2021-09-30

## 2021-10-04 DIAGNOSIS — E78.5 DM TYPE 2 WITH DIABETIC DYSLIPIDEMIA: ICD-10-CM

## 2021-10-04 DIAGNOSIS — E11.69 DM TYPE 2 WITH DIABETIC DYSLIPIDEMIA: ICD-10-CM

## 2021-10-04 DIAGNOSIS — I87.2 VENOUS INSUFFICIENCY OF BOTH LOWER EXTREMITIES: ICD-10-CM

## 2021-10-04 RX ORDER — GLIPIZIDE 2.5 MG/1
2.5 TABLET, EXTENDED RELEASE ORAL
Qty: 30 TABLET | Refills: 6 | Status: SHIPPED | OUTPATIENT
Start: 2021-10-04 | End: 2022-03-29 | Stop reason: SDUPTHER

## 2021-10-04 RX ORDER — POTASSIUM CHLORIDE 750 MG/1
10 TABLET, EXTENDED RELEASE ORAL DAILY
Qty: 30 TABLET | Refills: 6 | Status: SHIPPED | OUTPATIENT
Start: 2021-10-04 | End: 2021-11-08 | Stop reason: SDUPTHER

## 2021-10-04 RX ORDER — FUROSEMIDE 40 MG/1
20 TABLET ORAL DAILY
Qty: 45 TABLET | Refills: 6 | Status: SHIPPED | OUTPATIENT
Start: 2021-10-04 | End: 2022-03-29 | Stop reason: SDUPTHER

## 2021-10-06 ENCOUNTER — DOCUMENT SCAN (OUTPATIENT)
Dept: HOME HEALTH SERVICES | Facility: HOSPITAL | Age: 86
End: 2021-10-06

## 2021-10-12 DIAGNOSIS — E78.5 DYSLIPIDEMIA: ICD-10-CM

## 2021-10-12 RX ORDER — ATORVASTATIN CALCIUM 40 MG/1
40 TABLET, FILM COATED ORAL NIGHTLY
Qty: 90 TABLET | Refills: 1 | Status: SHIPPED | OUTPATIENT
Start: 2021-10-12 | End: 2022-03-10 | Stop reason: SDUPTHER

## 2021-10-20 ENCOUNTER — DOCUMENT SCAN (OUTPATIENT)
Dept: HOME HEALTH SERVICES | Facility: HOSPITAL | Age: 86
End: 2021-10-20

## 2021-10-29 ENCOUNTER — DOCUMENT SCAN (OUTPATIENT)
Dept: HOME HEALTH SERVICES | Facility: HOSPITAL | Age: 86
End: 2021-10-29
Payer: MEDICARE

## 2021-11-05 ENCOUNTER — DOCUMENT SCAN (OUTPATIENT)
Dept: HOME HEALTH SERVICES | Facility: HOSPITAL | Age: 86
End: 2021-11-05
Payer: MEDICARE

## 2021-11-08 ENCOUNTER — DOCUMENT SCAN (OUTPATIENT)
Dept: HOME HEALTH SERVICES | Facility: HOSPITAL | Age: 86
End: 2021-11-08
Payer: MEDICARE

## 2021-11-08 DIAGNOSIS — I87.2 VENOUS INSUFFICIENCY OF BOTH LOWER EXTREMITIES: ICD-10-CM

## 2021-11-08 RX ORDER — POTASSIUM CHLORIDE 750 MG/1
10 TABLET, EXTENDED RELEASE ORAL DAILY
Qty: 30 TABLET | Refills: 5 | Status: SHIPPED | OUTPATIENT
Start: 2021-11-08 | End: 2022-03-29 | Stop reason: SDUPTHER

## 2021-11-09 DIAGNOSIS — E11.69 DM TYPE 2 WITH DIABETIC DYSLIPIDEMIA: ICD-10-CM

## 2021-11-09 DIAGNOSIS — E78.5 DM TYPE 2 WITH DIABETIC DYSLIPIDEMIA: ICD-10-CM

## 2021-11-09 RX ORDER — METFORMIN HYDROCHLORIDE 500 MG/1
500 TABLET, EXTENDED RELEASE ORAL
Qty: 90 TABLET | Refills: 1 | Status: SHIPPED | OUTPATIENT
Start: 2021-11-09 | End: 2022-02-04 | Stop reason: SDUPTHER

## 2021-11-22 ENCOUNTER — TELEPHONE (OUTPATIENT)
Dept: FAMILY MEDICINE | Facility: CLINIC | Age: 86
End: 2021-11-22
Payer: MEDICARE

## 2021-11-23 DIAGNOSIS — G62.9 NEUROPATHY: ICD-10-CM

## 2021-11-23 DIAGNOSIS — I10 ESSENTIAL HYPERTENSION: ICD-10-CM

## 2021-11-23 RX ORDER — AMLODIPINE BESYLATE 5 MG/1
5 TABLET ORAL NIGHTLY
Qty: 90 TABLET | Refills: 0 | Status: SHIPPED | OUTPATIENT
Start: 2021-11-23 | End: 2022-03-03 | Stop reason: SDUPTHER

## 2021-11-23 RX ORDER — GABAPENTIN 100 MG/1
100 CAPSULE ORAL DAILY
Qty: 90 CAPSULE | Refills: 1 | Status: SHIPPED | OUTPATIENT
Start: 2021-11-23 | End: 2022-03-10 | Stop reason: SDUPTHER

## 2021-11-23 RX ORDER — OLMESARTAN MEDOXOMIL 40 MG/1
40 TABLET ORAL DAILY
Qty: 90 TABLET | Refills: 1 | Status: SHIPPED | OUTPATIENT
Start: 2021-11-23 | End: 2022-03-10 | Stop reason: SDUPTHER

## 2021-11-24 RX ORDER — PANTOPRAZOLE SODIUM 40 MG/1
40 TABLET, DELAYED RELEASE ORAL DAILY
Qty: 90 TABLET | Refills: 1 | Status: SHIPPED | OUTPATIENT
Start: 2021-11-24 | End: 2023-01-01

## 2021-11-30 ENCOUNTER — OFFICE VISIT (OUTPATIENT)
Dept: FAMILY MEDICINE | Facility: CLINIC | Age: 86
End: 2021-11-30
Payer: MEDICARE

## 2021-11-30 DIAGNOSIS — Z79.01 CHRONIC ANTICOAGULATION: ICD-10-CM

## 2021-11-30 DIAGNOSIS — I48.0 PAF (PAROXYSMAL ATRIAL FIBRILLATION): ICD-10-CM

## 2021-11-30 DIAGNOSIS — R53.81 DEBILITY: ICD-10-CM

## 2021-11-30 DIAGNOSIS — I63.339: Primary | ICD-10-CM

## 2021-11-30 DIAGNOSIS — I21.4 NSTEMI (NON-ST ELEVATED MYOCARDIAL INFARCTION): ICD-10-CM

## 2021-11-30 DIAGNOSIS — E66.01 MORBID (SEVERE) OBESITY DUE TO EXCESS CALORIES: ICD-10-CM

## 2021-11-30 DIAGNOSIS — E78.2 MIXED HYPERLIPIDEMIA: ICD-10-CM

## 2021-11-30 DIAGNOSIS — Z86.73 HISTORY OF CVA (CEREBROVASCULAR ACCIDENT): ICD-10-CM

## 2021-11-30 DIAGNOSIS — I10 PRIMARY HYPERTENSION: ICD-10-CM

## 2021-11-30 DIAGNOSIS — E11.9 TYPE 2 DIABETES MELLITUS WITHOUT COMPLICATION, WITHOUT LONG-TERM CURRENT USE OF INSULIN: ICD-10-CM

## 2021-11-30 PROCEDURE — 99214 OFFICE O/P EST MOD 30 MIN: CPT | Mod: S$GLB,,, | Performed by: FAMILY MEDICINE

## 2021-11-30 PROCEDURE — 99214 PR OFFICE/OUTPT VISIT, EST, LEVL IV, 30-39 MIN: ICD-10-PCS | Mod: S$GLB,,, | Performed by: FAMILY MEDICINE

## 2022-01-01 ENCOUNTER — TELEPHONE (OUTPATIENT)
Dept: FAMILY MEDICINE | Facility: CLINIC | Age: 87
End: 2022-01-01

## 2022-01-01 ENCOUNTER — DOCUMENT SCAN (OUTPATIENT)
Dept: HOME HEALTH SERVICES | Facility: HOSPITAL | Age: 87
End: 2022-01-01
Payer: MEDICARE

## 2022-01-01 ENCOUNTER — OFFICE VISIT (OUTPATIENT)
Dept: FAMILY MEDICINE | Facility: CLINIC | Age: 87
End: 2022-01-01
Payer: MEDICARE

## 2022-01-01 ENCOUNTER — HOSPITAL ENCOUNTER (EMERGENCY)
Facility: HOSPITAL | Age: 87
Discharge: PSYCHIATRIC OR SUBSTANCE USE FACILITY | End: 2022-12-09
Attending: EMERGENCY MEDICINE
Payer: MEDICARE

## 2022-01-01 ENCOUNTER — HOSPITAL ENCOUNTER (INPATIENT)
Facility: HOSPITAL | Age: 87
LOS: 8 days | Discharge: NURSING, REHAB OR LONG-TERM CARE | DRG: 915 | End: 2022-12-21
Attending: EMERGENCY MEDICINE | Admitting: HOSPITALIST
Payer: MEDICARE

## 2022-01-01 ENCOUNTER — EXTERNAL HOME HEALTH (OUTPATIENT)
Dept: HOME HEALTH SERVICES | Facility: HOSPITAL | Age: 87
End: 2022-01-01
Payer: MEDICARE

## 2022-01-01 VITALS
WEIGHT: 176.38 LBS | RESPIRATION RATE: 18 BRPM | RESPIRATION RATE: 13 BRPM | SYSTOLIC BLOOD PRESSURE: 122 MMHG | OXYGEN SATURATION: 99 % | OXYGEN SATURATION: 99 % | SYSTOLIC BLOOD PRESSURE: 103 MMHG | TEMPERATURE: 98 F | DIASTOLIC BLOOD PRESSURE: 54 MMHG | BODY MASS INDEX: 29.76 KG/M2 | HEART RATE: 67 BPM | DIASTOLIC BLOOD PRESSURE: 65 MMHG | HEIGHT: 67 IN | WEIGHT: 190 LBS | TEMPERATURE: 97 F | BODY MASS INDEX: 27.68 KG/M2 | HEART RATE: 89 BPM

## 2022-01-01 VITALS
HEIGHT: 67 IN | SYSTOLIC BLOOD PRESSURE: 128 MMHG | BODY MASS INDEX: 32.11 KG/M2 | DIASTOLIC BLOOD PRESSURE: 60 MMHG | OXYGEN SATURATION: 96 % | HEART RATE: 64 BPM | TEMPERATURE: 98 F

## 2022-01-01 DIAGNOSIS — G93.40 ACUTE ENCEPHALOPATHY: ICD-10-CM

## 2022-01-01 DIAGNOSIS — S31.000A SACRAL WOUND, INITIAL ENCOUNTER: Primary | ICD-10-CM

## 2022-01-01 DIAGNOSIS — I95.9 HYPOTENSION: ICD-10-CM

## 2022-01-01 DIAGNOSIS — R41.82 ALTERED MENTAL STATUS, UNSPECIFIED ALTERED MENTAL STATUS TYPE: ICD-10-CM

## 2022-01-01 DIAGNOSIS — Z71.89 ACP (ADVANCE CARE PLANNING): ICD-10-CM

## 2022-01-01 DIAGNOSIS — R57.9 SHOCK, UNSPECIFIED: ICD-10-CM

## 2022-01-01 DIAGNOSIS — T68.XXXA HYPOTHERMIA, INITIAL ENCOUNTER: Primary | ICD-10-CM

## 2022-01-01 DIAGNOSIS — I87.2 VENOUS INSUFFICIENCY OF BOTH LOWER EXTREMITIES: Primary | ICD-10-CM

## 2022-01-01 DIAGNOSIS — E11.9 TYPE 2 DIABETES MELLITUS WITHOUT COMPLICATION, WITHOUT LONG-TERM CURRENT USE OF INSULIN: Primary | ICD-10-CM

## 2022-01-01 DIAGNOSIS — R53.1 WEAKNESS: ICD-10-CM

## 2022-01-01 DIAGNOSIS — I10 ESSENTIAL HYPERTENSION: ICD-10-CM

## 2022-01-01 DIAGNOSIS — I63.339: ICD-10-CM

## 2022-01-01 DIAGNOSIS — E78.5 DYSLIPIDEMIA: ICD-10-CM

## 2022-01-01 DIAGNOSIS — I87.2 VENOUS INSUFFICIENCY OF BOTH LOWER EXTREMITIES: ICD-10-CM

## 2022-01-01 DIAGNOSIS — E66.01 MORBID (SEVERE) OBESITY DUE TO EXCESS CALORIES: ICD-10-CM

## 2022-01-01 DIAGNOSIS — R07.9 CHEST PAIN: ICD-10-CM

## 2022-01-01 DIAGNOSIS — I89.0 LYMPHEDEMA: ICD-10-CM

## 2022-01-01 DIAGNOSIS — N28.9 FUNCTION KIDNEY DECREASED: Primary | ICD-10-CM

## 2022-01-01 DIAGNOSIS — E86.0 DEHYDRATION: ICD-10-CM

## 2022-01-01 DIAGNOSIS — G62.9 NEUROPATHY: ICD-10-CM

## 2022-01-01 DIAGNOSIS — R57.9 SHOCK: ICD-10-CM

## 2022-01-01 DIAGNOSIS — Z00.8 MEDICAL CLEARANCE FOR PSYCHIATRIC ADMISSION: ICD-10-CM

## 2022-01-01 DIAGNOSIS — R00.1 BRADYCARDIA: ICD-10-CM

## 2022-01-01 DIAGNOSIS — Z51.5 PALLIATIVE CARE ENCOUNTER: ICD-10-CM

## 2022-01-01 DIAGNOSIS — Z66 DNR (DO NOT RESUSCITATE): ICD-10-CM

## 2022-01-01 DIAGNOSIS — Z86.73 HISTORY OF CVA (CEREBROVASCULAR ACCIDENT): ICD-10-CM

## 2022-01-01 DIAGNOSIS — I48.0 PAF (PAROXYSMAL ATRIAL FIBRILLATION): ICD-10-CM

## 2022-01-01 DIAGNOSIS — F22 PARANOID: ICD-10-CM

## 2022-01-01 LAB
ACTH PLAS-MCNC: <5 PG/ML (ref 0–46)
ALBUMIN SERPL BCP-MCNC: 2.2 G/DL (ref 3.5–5.2)
ALBUMIN SERPL BCP-MCNC: 2.5 G/DL (ref 3.5–5.2)
ALBUMIN SERPL BCP-MCNC: 2.6 G/DL (ref 3.5–5.2)
ALBUMIN SERPL BCP-MCNC: 2.7 G/DL (ref 3.5–5.2)
ALBUMIN SERPL BCP-MCNC: 3.1 G/DL (ref 3.5–5.2)
ALBUMIN SERPL BCP-MCNC: 3.5 G/DL (ref 3.5–5.2)
ALBUMIN SERPL-MCNC: 3.8 G/DL (ref 3.6–5.1)
ALBUMIN/GLOB SERPL: 1.2 (CALC) (ref 1–2.5)
ALLENS TEST: ABNORMAL
ALLENS TEST: NORMAL
ALP SERPL-CCNC: 109 U/L (ref 37–153)
ALP SERPL-CCNC: 120 U/L (ref 55–135)
ALP SERPL-CCNC: 124 U/L (ref 55–135)
ALP SERPL-CCNC: 80 U/L (ref 55–135)
ALP SERPL-CCNC: 81 U/L (ref 55–135)
ALP SERPL-CCNC: 96 U/L (ref 55–135)
ALP SERPL-CCNC: 99 U/L (ref 55–135)
ALT SERPL W/O P-5'-P-CCNC: 12 U/L (ref 10–44)
ALT SERPL W/O P-5'-P-CCNC: 14 U/L (ref 10–44)
ALT SERPL W/O P-5'-P-CCNC: 15 U/L (ref 10–44)
ALT SERPL W/O P-5'-P-CCNC: 16 U/L (ref 10–44)
ALT SERPL W/O P-5'-P-CCNC: 20 U/L (ref 10–44)
ALT SERPL W/O P-5'-P-CCNC: 21 U/L (ref 10–44)
ALT SERPL-CCNC: 10 U/L (ref 6–29)
AMPHET+METHAMPHET UR QL: NEGATIVE
ANION GAP SERPL CALC-SCNC: 10 MMOL/L (ref 8–16)
ANION GAP SERPL CALC-SCNC: 11 MMOL/L (ref 8–16)
ANION GAP SERPL CALC-SCNC: 13 MMOL/L (ref 8–16)
ANION GAP SERPL CALC-SCNC: 6 MMOL/L (ref 8–16)
ANION GAP SERPL CALC-SCNC: 7 MMOL/L (ref 8–16)
ANION GAP SERPL CALC-SCNC: 8 MMOL/L (ref 8–16)
ANION GAP SERPL CALC-SCNC: 9 MMOL/L (ref 8–16)
APAP SERPL-MCNC: <10 UG/ML (ref 10–20)
ASCENDING AORTA: 3.08 CM
AST SERPL-CCNC: 17 U/L (ref 10–40)
AST SERPL-CCNC: 18 U/L (ref 10–35)
AST SERPL-CCNC: 21 U/L (ref 10–40)
AST SERPL-CCNC: 22 U/L (ref 10–40)
AST SERPL-CCNC: 22 U/L (ref 10–40)
AST SERPL-CCNC: 42 U/L (ref 10–40)
AST SERPL-CCNC: 54 U/L (ref 10–40)
AV INDEX (PROSTH): 0.63
AV MEAN GRADIENT: 4 MMHG
AV PEAK GRADIENT: 7 MMHG
AV VALVE AREA: 2.68 CM2
AV VELOCITY RATIO: 0.52
BACTERIA #/AREA URNS HPF: ABNORMAL /HPF
BACTERIA BLD CULT: NORMAL
BACTERIA BLD CULT: NORMAL
BACTERIA UR CULT: NO GROWTH
BARBITURATES UR QL SCN>200 NG/ML: NEGATIVE
BASOPHILS # BLD AUTO: 0.02 K/UL (ref 0–0.2)
BASOPHILS # BLD AUTO: 0.02 K/UL (ref 0–0.2)
BASOPHILS # BLD AUTO: 0.03 K/UL (ref 0–0.2)
BASOPHILS # BLD AUTO: 0.04 K/UL (ref 0–0.2)
BASOPHILS # BLD AUTO: 0.05 K/UL (ref 0–0.2)
BASOPHILS # BLD AUTO: 0.06 K/UL (ref 0–0.2)
BASOPHILS # BLD AUTO: 0.07 K/UL (ref 0–0.2)
BASOPHILS # BLD AUTO: 62 CELLS/UL (ref 0–200)
BASOPHILS NFR BLD AUTO: 0.7 %
BASOPHILS NFR BLD: 0.1 % (ref 0–1.9)
BASOPHILS NFR BLD: 0.2 % (ref 0–1.9)
BASOPHILS NFR BLD: 0.3 % (ref 0–1.9)
BASOPHILS NFR BLD: 0.4 % (ref 0–1.9)
BASOPHILS NFR BLD: 0.5 % (ref 0–1.9)
BASOPHILS NFR BLD: 0.5 % (ref 0–1.9)
BASOPHILS NFR BLD: 0.6 % (ref 0–1.9)
BENZODIAZ UR QL SCN>200 NG/ML: NEGATIVE
BILIRUB SERPL-MCNC: 0.2 MG/DL (ref 0.1–1)
BILIRUB SERPL-MCNC: 0.3 MG/DL (ref 0.1–1)
BILIRUB SERPL-MCNC: 0.4 MG/DL (ref 0.2–1.2)
BILIRUB SERPL-MCNC: 0.5 MG/DL (ref 0.1–1)
BILIRUB UR QL STRIP: NEGATIVE
BNP SERPL-MCNC: 136 PG/ML (ref 0–99)
BSA FOR ECHO PROCEDURE: 2.02 M2
BUN SERPL-MCNC: 11 MG/DL (ref 8–23)
BUN SERPL-MCNC: 18 MG/DL (ref 8–23)
BUN SERPL-MCNC: 28 MG/DL (ref 8–23)
BUN SERPL-MCNC: 33 MG/DL (ref 8–23)
BUN SERPL-MCNC: 35 MG/DL (ref 8–23)
BUN SERPL-MCNC: 36 MG/DL (ref 7–25)
BUN/CREAT SERPL: 28 (CALC) (ref 6–22)
BZE UR QL SCN: NEGATIVE
CALCIUM SERPL-MCNC: 7.2 MG/DL (ref 8.7–10.5)
CALCIUM SERPL-MCNC: 7.7 MG/DL (ref 8.7–10.5)
CALCIUM SERPL-MCNC: 8.2 MG/DL (ref 8.7–10.5)
CALCIUM SERPL-MCNC: 8.2 MG/DL (ref 8.7–10.5)
CALCIUM SERPL-MCNC: 8.5 MG/DL (ref 8.7–10.5)
CALCIUM SERPL-MCNC: 8.8 MG/DL (ref 8.7–10.5)
CALCIUM SERPL-MCNC: 8.8 MG/DL (ref 8.7–10.5)
CALCIUM SERPL-MCNC: 8.9 MG/DL (ref 8.6–10.4)
CANNABINOIDS UR QL SCN: NEGATIVE
CHLORIDE SERPL-SCNC: 107 MMOL/L (ref 98–110)
CHLORIDE SERPL-SCNC: 109 MMOL/L (ref 95–110)
CHLORIDE SERPL-SCNC: 109 MMOL/L (ref 95–110)
CHLORIDE SERPL-SCNC: 111 MMOL/L (ref 95–110)
CHLORIDE SERPL-SCNC: 114 MMOL/L (ref 95–110)
CHLORIDE SERPL-SCNC: 116 MMOL/L (ref 95–110)
CHLORIDE SERPL-SCNC: 116 MMOL/L (ref 95–110)
CHLORIDE SERPL-SCNC: 121 MMOL/L (ref 95–110)
CHOLEST SERPL-MCNC: 93 MG/DL
CHOLEST/HDLC SERPL: 2.4 (CALC)
CLARITY UR: ABNORMAL
CLARITY UR: ABNORMAL
CLARITY UR: CLEAR
CO2 SERPL-SCNC: 13 MMOL/L (ref 23–29)
CO2 SERPL-SCNC: 15 MMOL/L (ref 23–29)
CO2 SERPL-SCNC: 17 MMOL/L (ref 23–29)
CO2 SERPL-SCNC: 20 MMOL/L (ref 23–29)
CO2 SERPL-SCNC: 22 MMOL/L (ref 23–29)
CO2 SERPL-SCNC: 25 MMOL/L (ref 20–32)
COLOR UR: YELLOW
CORTIS SERPL-MCNC: 10.6 UG/DL
CORTIS SERPL-MCNC: 17.2 UG/DL
CORTIS SERPL-MCNC: 31.7 UG/DL
CREAT SERPL-MCNC: 0.7 MG/DL (ref 0.5–1.4)
CREAT SERPL-MCNC: 0.7 MG/DL (ref 0.5–1.4)
CREAT SERPL-MCNC: 1.1 MG/DL (ref 0.5–1.4)
CREAT SERPL-MCNC: 1.29 MG/DL (ref 0.6–0.95)
CREAT SERPL-MCNC: 1.4 MG/DL (ref 0.5–1.4)
CREAT SERPL-MCNC: 1.6 MG/DL (ref 0.5–1.4)
CREAT SERPL-MCNC: 1.6 MG/DL (ref 0.5–1.4)
CREAT SERPL-MCNC: 1.7 MG/DL (ref 0.5–1.4)
CREAT UR-MCNC: 131 MG/DL (ref 15–325)
CV ECHO LV RWT: 0.86 CM
DIFFERENTIAL METHOD: ABNORMAL
DOP CALC AO PEAK VEL: 1.3 M/S
DOP CALC AO VTI: 29.2 CM
DOP CALC LVOT AREA: 4.2 CM2
DOP CALC LVOT DIAMETER: 2.32 CM
DOP CALC LVOT PEAK VEL: 0.68 M/S
DOP CALC LVOT STROKE VOLUME: 78.17 CM3
DOP CALCLVOT PEAK VEL VTI: 18.5 CM
E/E' RATIO: 6.75 M/S
ECHO LV POSTERIOR WALL: 1.45 CM (ref 0.6–1.1)
EGFR: 40 ML/MIN/1.73M2
EJECTION FRACTION: 55 %
EOSINOPHIL # BLD AUTO: 0 K/UL (ref 0–0.5)
EOSINOPHIL # BLD AUTO: 0 K/UL (ref 0–0.5)
EOSINOPHIL # BLD AUTO: 0.1 K/UL (ref 0–0.5)
EOSINOPHIL # BLD AUTO: 0.1 K/UL (ref 0–0.5)
EOSINOPHIL # BLD AUTO: 0.3 K/UL (ref 0–0.5)
EOSINOPHIL # BLD AUTO: 0.3 K/UL (ref 0–0.5)
EOSINOPHIL # BLD AUTO: 0.5 K/UL (ref 0–0.5)
EOSINOPHIL # BLD AUTO: 365 CELLS/UL (ref 15–500)
EOSINOPHIL NFR BLD AUTO: 4.1 %
EOSINOPHIL NFR BLD: 0.1 % (ref 0–8)
EOSINOPHIL NFR BLD: 0.3 % (ref 0–8)
EOSINOPHIL NFR BLD: 0.4 % (ref 0–8)
EOSINOPHIL NFR BLD: 1.1 % (ref 0–8)
EOSINOPHIL NFR BLD: 2.1 % (ref 0–8)
EOSINOPHIL NFR BLD: 3.3 % (ref 0–8)
EOSINOPHIL NFR BLD: 4.5 % (ref 0–8)
ERYTHROCYTE [DISTWIDTH] IN BLOOD BY AUTOMATED COUNT: 12.4 % (ref 11–15)
ERYTHROCYTE [DISTWIDTH] IN BLOOD BY AUTOMATED COUNT: 14.7 % (ref 11.5–14.5)
ERYTHROCYTE [DISTWIDTH] IN BLOOD BY AUTOMATED COUNT: 14.7 % (ref 11.5–14.5)
ERYTHROCYTE [DISTWIDTH] IN BLOOD BY AUTOMATED COUNT: 14.8 % (ref 11.5–14.5)
ERYTHROCYTE [DISTWIDTH] IN BLOOD BY AUTOMATED COUNT: 14.9 % (ref 11.5–14.5)
ERYTHROCYTE [DISTWIDTH] IN BLOOD BY AUTOMATED COUNT: 15 % (ref 11.5–14.5)
ERYTHROCYTE [DISTWIDTH] IN BLOOD BY AUTOMATED COUNT: 15 % (ref 11.5–14.5)
ERYTHROCYTE [DISTWIDTH] IN BLOOD BY AUTOMATED COUNT: 15.2 % (ref 11.5–14.5)
EST. GFR  (NO RACE VARIABLE): 28 ML/MIN/1.73 M^2
EST. GFR  (NO RACE VARIABLE): 31 ML/MIN/1.73 M^2
EST. GFR  (NO RACE VARIABLE): 31 ML/MIN/1.73 M^2
EST. GFR  (NO RACE VARIABLE): 36 ML/MIN/1.73 M^2
EST. GFR  (NO RACE VARIABLE): 48 ML/MIN/1.73 M^2
EST. GFR  (NO RACE VARIABLE): >60 ML/MIN/1.73 M^2
EST. GFR  (NO RACE VARIABLE): >60 ML/MIN/1.73 M^2
ESTIMATED AVG GLUCOSE: 120 MG/DL (ref 68–131)
ETHANOL SERPL-MCNC: <5 MG/DL
FERRITIN SERPL-MCNC: 83 NG/ML (ref 20–300)
FOLATE SERPL-MCNC: 5 NG/ML (ref 4–24)
FRACTIONAL SHORTENING: 28 % (ref 28–44)
GLOBULIN SER CALC-MCNC: 3.3 G/DL (CALC) (ref 1.9–3.7)
GLUCOSE SERPL-MCNC: 112 MG/DL (ref 65–99)
GLUCOSE SERPL-MCNC: 129 MG/DL (ref 70–110)
GLUCOSE SERPL-MCNC: 146 MG/DL (ref 70–110)
GLUCOSE SERPL-MCNC: 295 MG/DL (ref 70–110)
GLUCOSE SERPL-MCNC: 86 MG/DL (ref 70–110)
GLUCOSE SERPL-MCNC: 97 MG/DL (ref 70–110)
GLUCOSE SERPL-MCNC: 98 MG/DL (ref 70–110)
GLUCOSE SERPL-MCNC: 99 MG/DL (ref 70–110)
GLUCOSE UR QL STRIP: NEGATIVE
HBA1C MFR BLD: 5.8 % (ref 4–5.6)
HBA1C MFR BLD: 5.9 %
HCO3 UR-SCNC: 21.3 MMOL/L (ref 24–28)
HCT VFR BLD AUTO: 29.4 % (ref 37–48.5)
HCT VFR BLD AUTO: 30.4 % (ref 37–48.5)
HCT VFR BLD AUTO: 32.7 % (ref 37–48.5)
HCT VFR BLD AUTO: 33.1 % (ref 37–48.5)
HCT VFR BLD AUTO: 33.7 % (ref 37–48.5)
HCT VFR BLD AUTO: 34.3 % (ref 37–48.5)
HCT VFR BLD AUTO: 34.6 % (ref 35–45)
HCT VFR BLD AUTO: 36.8 % (ref 37–48.5)
HDLC SERPL-MCNC: 38 MG/DL
HGB BLD-MCNC: 10.2 G/DL (ref 12–16)
HGB BLD-MCNC: 10.8 G/DL (ref 12–16)
HGB BLD-MCNC: 11.1 G/DL (ref 12–16)
HGB BLD-MCNC: 11.2 G/DL (ref 12–16)
HGB BLD-MCNC: 11.2 G/DL (ref 12–16)
HGB BLD-MCNC: 11.3 G/DL (ref 11.7–15.5)
HGB BLD-MCNC: 12.2 G/DL (ref 12–16)
HGB BLD-MCNC: 9.6 G/DL (ref 12–16)
HGB UR QL STRIP: ABNORMAL
HGB UR QL STRIP: NEGATIVE
HGB UR QL STRIP: NEGATIVE
HYALINE CASTS #/AREA URNS LPF: 0 /LPF
IMM GRANULOCYTES # BLD AUTO: 0.03 K/UL (ref 0–0.04)
IMM GRANULOCYTES # BLD AUTO: 0.04 K/UL (ref 0–0.04)
IMM GRANULOCYTES # BLD AUTO: 0.04 K/UL (ref 0–0.04)
IMM GRANULOCYTES # BLD AUTO: 0.07 K/UL (ref 0–0.04)
IMM GRANULOCYTES # BLD AUTO: 0.08 K/UL (ref 0–0.04)
IMM GRANULOCYTES NFR BLD AUTO: 0.3 % (ref 0–0.5)
IMM GRANULOCYTES NFR BLD AUTO: 0.3 % (ref 0–0.5)
IMM GRANULOCYTES NFR BLD AUTO: 0.4 % (ref 0–0.5)
IMM GRANULOCYTES NFR BLD AUTO: 0.5 % (ref 0–0.5)
IMM GRANULOCYTES NFR BLD AUTO: 0.6 % (ref 0–0.5)
IMM GRANULOCYTES NFR BLD AUTO: 0.7 % (ref 0–0.5)
IMM GRANULOCYTES NFR BLD AUTO: 0.7 % (ref 0–0.5)
INTERVENTRICULAR SEPTUM: 1.41 CM (ref 0.6–1.1)
IRON SERPL-MCNC: 23 UG/DL (ref 30–160)
IVC DIAMETER: 1.79 CM
IVRT: 111.32 MSEC
KETONES UR QL STRIP: NEGATIVE
LA MAJOR: 6.9 CM
LA MINOR: 4.72 CM
LA WIDTH: 4.7 CM
LACTATE SERPL-SCNC: 1.6 MMOL/L (ref 0.5–2.2)
LACTATE SERPL-SCNC: 1.7 MMOL/L (ref 0.5–2.2)
LDH SERPL L TO P-CCNC: 1.1 MMOL/L (ref 0.5–2.2)
LDLC SERPL CALC-MCNC: 39 MG/DL (CALC)
LEFT ATRIUM SIZE: 4.79 CM
LEFT ATRIUM VOLUME INDEX: 55.9 ML/M2
LEFT ATRIUM VOLUME: 107.27 CM3
LEFT INTERNAL DIMENSION IN SYSTOLE: 2.43 CM (ref 2.1–4)
LEFT VENTRICLE DIASTOLIC VOLUME INDEX: 24.57 ML/M2
LEFT VENTRICLE DIASTOLIC VOLUME: 47.18 ML
LEFT VENTRICLE MASS INDEX: 89 G/M2
LEFT VENTRICLE SYSTOLIC VOLUME INDEX: 10.9 ML/M2
LEFT VENTRICLE SYSTOLIC VOLUME: 20.88 ML
LEFT VENTRICULAR INTERNAL DIMENSION IN DIASTOLE: 3.39 CM (ref 3.5–6)
LEFT VENTRICULAR MASS: 171.31 G
LEUKOCYTE ESTERASE UR QL STRIP: ABNORMAL
LEUKOCYTE ESTERASE UR QL STRIP: NEGATIVE
LEUKOCYTE ESTERASE UR QL STRIP: NEGATIVE
LV LATERAL E/E' RATIO: 5.4 M/S
LV SEPTAL E/E' RATIO: 9 M/S
LVOT MG: 1.01 MMHG
LVOT MV: 0.48 CM/S
LYMPHOCYTES # BLD AUTO: 1.1 K/UL (ref 1–4.8)
LYMPHOCYTES # BLD AUTO: 1.2 K/UL (ref 1–4.8)
LYMPHOCYTES # BLD AUTO: 1.9 K/UL (ref 1–4.8)
LYMPHOCYTES # BLD AUTO: 1851 CELLS/UL (ref 850–3900)
LYMPHOCYTES # BLD AUTO: 2 K/UL (ref 1–4.8)
LYMPHOCYTES # BLD AUTO: 2.1 K/UL (ref 1–4.8)
LYMPHOCYTES NFR BLD AUTO: 20.8 %
LYMPHOCYTES NFR BLD: 10.3 % (ref 18–48)
LYMPHOCYTES NFR BLD: 12.7 % (ref 18–48)
LYMPHOCYTES NFR BLD: 17 % (ref 18–48)
LYMPHOCYTES NFR BLD: 17.3 % (ref 18–48)
LYMPHOCYTES NFR BLD: 20.4 % (ref 18–48)
LYMPHOCYTES NFR BLD: 21.1 % (ref 18–48)
LYMPHOCYTES NFR BLD: 9.9 % (ref 18–48)
MAGNESIUM SERPL-MCNC: 1.4 MG/DL (ref 1.6–2.6)
MAGNESIUM SERPL-MCNC: 1.4 MG/DL (ref 1.6–2.6)
MAGNESIUM SERPL-MCNC: 1.8 MG/DL (ref 1.6–2.6)
MAGNESIUM SERPL-MCNC: 2 MG/DL (ref 1.6–2.6)
MAGNESIUM SERPL-MCNC: 2.1 MG/DL (ref 1.6–2.6)
MCH RBC QN AUTO: 29.8 PG (ref 27–31)
MCH RBC QN AUTO: 29.9 PG (ref 27–31)
MCH RBC QN AUTO: 29.9 PG (ref 27–31)
MCH RBC QN AUTO: 30 PG (ref 27–31)
MCH RBC QN AUTO: 30 PG (ref 27–33)
MCH RBC QN AUTO: 30.1 PG (ref 27–31)
MCH RBC QN AUTO: 30.2 PG (ref 27–31)
MCH RBC QN AUTO: 30.4 PG (ref 27–31)
MCHC RBC AUTO-ENTMCNC: 32 G/DL (ref 32–36)
MCHC RBC AUTO-ENTMCNC: 32.4 G/DL (ref 32–36)
MCHC RBC AUTO-ENTMCNC: 32.7 G/DL (ref 32–36)
MCHC RBC AUTO-ENTMCNC: 32.7 G/DL (ref 32–36)
MCHC RBC AUTO-ENTMCNC: 33.2 G/DL (ref 32–36)
MCHC RBC AUTO-ENTMCNC: 33.6 G/DL (ref 32–36)
MCHC RBC AUTO-ENTMCNC: 33.8 G/DL (ref 32–36)
MCHC RBC AUTO-ENTMCNC: 34.3 G/DL (ref 32–36)
MCV RBC AUTO: 88 FL (ref 82–98)
MCV RBC AUTO: 90 FL (ref 82–98)
MCV RBC AUTO: 91.8 FL (ref 80–100)
MCV RBC AUTO: 92 FL (ref 82–98)
MCV RBC AUTO: 93 FL (ref 82–98)
MCV RBC AUTO: 93 FL (ref 82–98)
MICROSCOPIC COMMENT: ABNORMAL
MONOCYTES # BLD AUTO: 0.6 K/UL (ref 0.3–1)
MONOCYTES # BLD AUTO: 0.8 K/UL (ref 0.3–1)
MONOCYTES # BLD AUTO: 1 K/UL (ref 0.3–1)
MONOCYTES # BLD AUTO: 1.1 K/UL (ref 0.3–1)
MONOCYTES # BLD AUTO: 1.1 K/UL (ref 0.3–1)
MONOCYTES # BLD AUTO: 783 CELLS/UL (ref 200–950)
MONOCYTES NFR BLD AUTO: 8.8 %
MONOCYTES NFR BLD: 10.5 % (ref 4–15)
MONOCYTES NFR BLD: 5.1 % (ref 4–15)
MONOCYTES NFR BLD: 6.3 % (ref 4–15)
MONOCYTES NFR BLD: 7.5 % (ref 4–15)
MONOCYTES NFR BLD: 8.6 % (ref 4–15)
MONOCYTES NFR BLD: 9.5 % (ref 4–15)
MONOCYTES NFR BLD: 9.8 % (ref 4–15)
MV PEAK E VEL: 0.54 M/S
NEUTROPHILS # BLD AUTO: 10.2 K/UL (ref 1.8–7.7)
NEUTROPHILS # BLD AUTO: 11.8 K/UL (ref 1.8–7.7)
NEUTROPHILS # BLD AUTO: 5838 CELLS/UL (ref 1500–7800)
NEUTROPHILS # BLD AUTO: 6.4 K/UL (ref 1.8–7.7)
NEUTROPHILS # BLD AUTO: 6.5 K/UL (ref 1.8–7.7)
NEUTROPHILS # BLD AUTO: 8.2 K/UL (ref 1.8–7.7)
NEUTROPHILS # BLD AUTO: 8.3 K/UL (ref 1.8–7.7)
NEUTROPHILS # BLD AUTO: 9.1 K/UL (ref 1.8–7.7)
NEUTROPHILS NFR BLD AUTO: 65.6 %
NEUTROPHILS NFR BLD: 63.5 % (ref 38–73)
NEUTROPHILS NFR BLD: 65 % (ref 38–73)
NEUTROPHILS NFR BLD: 70.1 % (ref 38–73)
NEUTROPHILS NFR BLD: 71.9 % (ref 38–73)
NEUTROPHILS NFR BLD: 79.1 % (ref 38–73)
NEUTROPHILS NFR BLD: 82.9 % (ref 38–73)
NEUTROPHILS NFR BLD: 83.6 % (ref 38–73)
NITRITE UR QL STRIP: NEGATIVE
NONHDLC SERPL-MCNC: 55 MG/DL (CALC)
NRBC BLD-RTO: 0 /100 WBC
OPIATES UR QL SCN: NEGATIVE
PCO2 BLDA: 48.9 MMHG (ref 35–45)
PCP UR QL SCN>25 NG/ML: NEGATIVE
PH SMN: 7.25 [PH] (ref 7.35–7.45)
PH UR STRIP: 5 [PH] (ref 5–8)
PHOSPHATE SERPL-MCNC: 2.5 MG/DL (ref 2.7–4.5)
PHOSPHATE SERPL-MCNC: 3 MG/DL (ref 2.7–4.5)
PHOSPHATE SERPL-MCNC: 4.1 MG/DL (ref 2.7–4.5)
PHOSPHATE SERPL-MCNC: 4.3 MG/DL (ref 2.7–4.5)
PHOSPHATE SERPL-MCNC: 4.6 MG/DL (ref 2.7–4.5)
PISA TR MAX VEL: 2.65 M/S
PLATELET # BLD AUTO: 228 K/UL (ref 150–450)
PLATELET # BLD AUTO: 291 K/UL (ref 150–450)
PLATELET # BLD AUTO: 294 K/UL (ref 150–450)
PLATELET # BLD AUTO: 298 K/UL (ref 150–450)
PLATELET # BLD AUTO: 301 K/UL (ref 150–450)
PLATELET # BLD AUTO: 317 THOUSAND/UL (ref 140–400)
PLATELET # BLD AUTO: 322 K/UL (ref 150–450)
PLATELET # BLD AUTO: 326 K/UL (ref 150–450)
PMV BLD AUTO: 10.3 FL (ref 9.2–12.9)
PMV BLD AUTO: 10.4 FL (ref 9.2–12.9)
PMV BLD AUTO: 10.5 FL (ref 9.2–12.9)
PMV BLD AUTO: 10.5 FL (ref 9.2–12.9)
PMV BLD AUTO: 10.6 FL (ref 9.2–12.9)
PMV BLD REES-ECKER: 11.1 FL (ref 7.5–12.5)
PO2 BLDA: 49 MMHG (ref 40–60)
POC BE: -6 MMOL/L
POC SATURATED O2: 77 % (ref 95–100)
POC TCO2: 23 MMOL/L (ref 24–29)
POCT GLUCOSE: 100 MG/DL (ref 70–110)
POCT GLUCOSE: 100 MG/DL (ref 70–110)
POCT GLUCOSE: 103 MG/DL (ref 70–110)
POCT GLUCOSE: 104 MG/DL (ref 70–110)
POCT GLUCOSE: 104 MG/DL (ref 70–110)
POCT GLUCOSE: 110 MG/DL (ref 70–110)
POCT GLUCOSE: 113 MG/DL (ref 70–110)
POCT GLUCOSE: 119 MG/DL (ref 70–110)
POCT GLUCOSE: 119 MG/DL (ref 70–110)
POCT GLUCOSE: 124 MG/DL (ref 70–110)
POCT GLUCOSE: 127 MG/DL (ref 70–110)
POCT GLUCOSE: 133 MG/DL (ref 70–110)
POCT GLUCOSE: 140 MG/DL (ref 70–110)
POCT GLUCOSE: 145 MG/DL (ref 70–110)
POCT GLUCOSE: 148 MG/DL (ref 70–110)
POCT GLUCOSE: 151 MG/DL (ref 70–110)
POCT GLUCOSE: 154 MG/DL (ref 70–110)
POCT GLUCOSE: 155 MG/DL (ref 70–110)
POCT GLUCOSE: 176 MG/DL (ref 70–110)
POCT GLUCOSE: 180 MG/DL (ref 70–110)
POCT GLUCOSE: 282 MG/DL (ref 70–110)
POCT GLUCOSE: 72 MG/DL (ref 70–110)
POCT GLUCOSE: 75 MG/DL (ref 70–110)
POCT GLUCOSE: 80 MG/DL (ref 70–110)
POCT GLUCOSE: 82 MG/DL (ref 70–110)
POCT GLUCOSE: 87 MG/DL (ref 70–110)
POCT GLUCOSE: 87 MG/DL (ref 70–110)
POCT GLUCOSE: 88 MG/DL (ref 70–110)
POCT GLUCOSE: 92 MG/DL (ref 70–110)
POCT GLUCOSE: 92 MG/DL (ref 70–110)
POCT GLUCOSE: 95 MG/DL (ref 70–110)
POCT GLUCOSE: 98 MG/DL (ref 70–110)
POTASSIUM SERPL-SCNC: 3.8 MMOL/L (ref 3.5–5.1)
POTASSIUM SERPL-SCNC: 4.3 MMOL/L (ref 3.5–5.3)
POTASSIUM SERPL-SCNC: 4.4 MMOL/L (ref 3.5–5.1)
POTASSIUM SERPL-SCNC: 4.5 MMOL/L (ref 3.5–5.1)
POTASSIUM SERPL-SCNC: 4.5 MMOL/L (ref 3.5–5.1)
POTASSIUM SERPL-SCNC: 5.4 MMOL/L (ref 3.5–5.1)
PROCALCITONIN SERPL IA-MCNC: 0.11 NG/ML
PROT SERPL-MCNC: 5.1 G/DL (ref 6–8.4)
PROT SERPL-MCNC: 5.8 G/DL (ref 6–8.4)
PROT SERPL-MCNC: 6.3 G/DL (ref 6–8.4)
PROT SERPL-MCNC: 6.3 G/DL (ref 6–8.4)
PROT SERPL-MCNC: 7.1 G/DL (ref 6.1–8.1)
PROT SERPL-MCNC: 7.2 G/DL (ref 6–8.4)
PROT SERPL-MCNC: 7.7 G/DL (ref 6–8.4)
PROT UR QL STRIP: ABNORMAL
PROT UR QL STRIP: ABNORMAL
PROT UR QL STRIP: NEGATIVE
PV PEAK VELOCITY: 0.78 CM/S
RA MAJOR: 5.87 CM
RA PRESSURE: 8 MMHG
RA WIDTH: 5.11 CM
RBC # BLD AUTO: 3.21 M/UL (ref 4–5.4)
RBC # BLD AUTO: 3.38 M/UL (ref 4–5.4)
RBC # BLD AUTO: 3.62 M/UL (ref 4–5.4)
RBC # BLD AUTO: 3.69 M/UL (ref 4–5.4)
RBC # BLD AUTO: 3.71 M/UL (ref 4–5.4)
RBC # BLD AUTO: 3.72 M/UL (ref 4–5.4)
RBC # BLD AUTO: 3.77 MILLION/UL (ref 3.8–5.1)
RBC # BLD AUTO: 4.07 M/UL (ref 4–5.4)
RBC #/AREA URNS HPF: 42 /HPF (ref 0–4)
RIGHT VENTRICULAR END-DIASTOLIC DIMENSION: 4.25 CM
SALICYLATES SERPL-MCNC: <4 MG/DL (ref 15–30)
SAMPLE: ABNORMAL
SAMPLE: NORMAL
SARS-COV-2 RDRP RESP QL NAA+PROBE: NEGATIVE
SARS-COV-2 RDRP RESP QL NAA+PROBE: NEGATIVE
SATURATED IRON: 10 % (ref 20–50)
SINUS: 3.01 CM
SITE: ABNORMAL
SITE: NORMAL
SODIUM SERPL-SCNC: 137 MMOL/L (ref 136–145)
SODIUM SERPL-SCNC: 138 MMOL/L (ref 136–145)
SODIUM SERPL-SCNC: 139 MMOL/L (ref 136–145)
SODIUM SERPL-SCNC: 140 MMOL/L (ref 136–145)
SODIUM SERPL-SCNC: 141 MMOL/L (ref 135–146)
SODIUM SERPL-SCNC: 142 MMOL/L (ref 136–145)
SODIUM SERPL-SCNC: 142 MMOL/L (ref 136–145)
SODIUM SERPL-SCNC: 143 MMOL/L (ref 136–145)
SP GR UR STRIP: 1.01 (ref 1–1.03)
SQUAMOUS #/AREA URNS HPF: 9 /HPF
STJ: 2.41 CM
T4 FREE SERPL-MCNC: 1.22 NG/DL (ref 0.71–1.51)
TDI LATERAL: 0.1 M/S
TDI SEPTAL: 0.06 M/S
TDI: 0.08 M/S
TOTAL IRON BINDING CAPACITY: 228 UG/DL (ref 250–450)
TOXICOLOGY INFORMATION: NORMAL
TR MAX PG: 28 MMHG
TRANSFERRIN SERPL-MCNC: 154 MG/DL (ref 200–375)
TRICUSPID ANNULAR PLANE SYSTOLIC EXCURSION: 1.59 CM
TRIGL SERPL-MCNC: 82 MG/DL
TROPONIN I SERPL DL<=0.01 NG/ML-MCNC: 0.02 NG/ML (ref 0–0.03)
TROPONIN I SERPL DL<=0.01 NG/ML-MCNC: <0.006 NG/ML (ref 0–0.03)
TROPONIN I SERPL HS-MCNC: 5.6 PG/ML (ref 0–14.9)
TSH SERPL DL<=0.005 MIU/L-ACNC: 5.23 UIU/ML (ref 0.34–5.6)
TSH SERPL DL<=0.005 MIU/L-ACNC: 7.61 UIU/ML (ref 0.4–4)
TSH SERPL-ACNC: 3.92 MIU/L (ref 0.4–4.5)
TV PEAK GRADIENT: 1.54 MMHG
TV REST PULMONARY ARTERY PRESSURE: 36 MMHG
URN SPEC COLLECT METH UR: ABNORMAL
URN SPEC COLLECT METH UR: ABNORMAL
URN SPEC COLLECT METH UR: NORMAL
UROBILINOGEN UR STRIP-ACNC: NEGATIVE EU/DL
VANCOMYCIN SERPL-MCNC: 20.7 UG/ML
VIT B12 SERPL-MCNC: 223 PG/ML (ref 210–950)
WBC # BLD AUTO: 10.11 K/UL (ref 3.9–12.7)
WBC # BLD AUTO: 10.92 K/UL (ref 3.9–12.7)
WBC # BLD AUTO: 11.5 K/UL (ref 3.9–12.7)
WBC # BLD AUTO: 11.74 K/UL (ref 3.9–12.7)
WBC # BLD AUTO: 12.33 K/UL (ref 3.9–12.7)
WBC # BLD AUTO: 14.94 K/UL (ref 3.9–12.7)
WBC # BLD AUTO: 8.9 THOUSAND/UL (ref 3.8–10.8)
WBC # BLD AUTO: 9.92 K/UL (ref 3.9–12.7)
WBC #/AREA URNS HPF: 15 /HPF (ref 0–5)

## 2022-01-01 PROCEDURE — 81003 URINALYSIS AUTO W/O SCOPE: CPT | Performed by: EMERGENCY MEDICINE

## 2022-01-01 PROCEDURE — 25000003 PHARM REV CODE 250: Performed by: HOSPITALIST

## 2022-01-01 PROCEDURE — 99291 CRITICAL CARE FIRST HOUR: CPT | Mod: ,,, | Performed by: INTERNAL MEDICINE

## 2022-01-01 PROCEDURE — 84100 ASSAY OF PHOSPHORUS: CPT | Performed by: INTERNAL MEDICINE

## 2022-01-01 PROCEDURE — 85025 COMPLETE CBC W/AUTO DIFF WBC: CPT | Performed by: INTERNAL MEDICINE

## 2022-01-01 PROCEDURE — 84145 PROCALCITONIN (PCT): CPT | Performed by: EMERGENCY MEDICINE

## 2022-01-01 PROCEDURE — 63600175 PHARM REV CODE 636 W HCPCS: Performed by: HOSPITALIST

## 2022-01-01 PROCEDURE — 51702 INSERT TEMP BLADDER CATH: CPT

## 2022-01-01 PROCEDURE — 83605 ASSAY OF LACTIC ACID: CPT

## 2022-01-01 PROCEDURE — 11000001 HC ACUTE MED/SURG PRIVATE ROOM

## 2022-01-01 PROCEDURE — 83735 ASSAY OF MAGNESIUM: CPT | Performed by: HOSPITALIST

## 2022-01-01 PROCEDURE — 94760 N-INVAS EAR/PLS OXIMETRY 1: CPT

## 2022-01-01 PROCEDURE — 25000003 PHARM REV CODE 250: Performed by: EMERGENCY MEDICINE

## 2022-01-01 PROCEDURE — 80053 COMPREHEN METABOLIC PANEL: CPT | Performed by: EMERGENCY MEDICINE

## 2022-01-01 PROCEDURE — 36415 COLL VENOUS BLD VENIPUNCTURE: CPT | Performed by: INTERNAL MEDICINE

## 2022-01-01 PROCEDURE — 99215 PR OFFICE/OUTPT VISIT, EST, LEVL V, 40-54 MIN: ICD-10-PCS | Mod: S$GLB,,, | Performed by: PHYSICIAN ASSISTANT

## 2022-01-01 PROCEDURE — 82728 ASSAY OF FERRITIN: CPT | Performed by: HOSPITALIST

## 2022-01-01 PROCEDURE — 80053 COMPREHEN METABOLIC PANEL: CPT | Performed by: HOSPITALIST

## 2022-01-01 PROCEDURE — 84443 ASSAY THYROID STIM HORMONE: CPT | Performed by: EMERGENCY MEDICINE

## 2022-01-01 PROCEDURE — 93010 EKG 12-LEAD: ICD-10-PCS | Mod: ,,, | Performed by: INTERNAL MEDICINE

## 2022-01-01 PROCEDURE — 83735 ASSAY OF MAGNESIUM: CPT | Performed by: INTERNAL MEDICINE

## 2022-01-01 PROCEDURE — 84466 ASSAY OF TRANSFERRIN: CPT | Performed by: HOSPITALIST

## 2022-01-01 PROCEDURE — U0002 COVID-19 LAB TEST NON-CDC: HCPCS | Performed by: INTERNAL MEDICINE

## 2022-01-01 PROCEDURE — 25000003 PHARM REV CODE 250: Performed by: INTERNAL MEDICINE

## 2022-01-01 PROCEDURE — 51701 INSERT BLADDER CATHETER: CPT

## 2022-01-01 PROCEDURE — 97535 SELF CARE MNGMENT TRAINING: CPT | Mod: CO

## 2022-01-01 PROCEDURE — 97535 SELF CARE MNGMENT TRAINING: CPT

## 2022-01-01 PROCEDURE — 97530 THERAPEUTIC ACTIVITIES: CPT | Mod: CQ

## 2022-01-01 PROCEDURE — 81000 URINALYSIS NONAUTO W/SCOPE: CPT | Performed by: INTERNAL MEDICINE

## 2022-01-01 PROCEDURE — 63600175 PHARM REV CODE 636 W HCPCS: Performed by: INTERNAL MEDICINE

## 2022-01-01 PROCEDURE — 83605 ASSAY OF LACTIC ACID: CPT | Mod: 91 | Performed by: EMERGENCY MEDICINE

## 2022-01-01 PROCEDURE — 80048 BASIC METABOLIC PNL TOTAL CA: CPT | Performed by: INTERNAL MEDICINE

## 2022-01-01 PROCEDURE — 86580 TB INTRADERMAL TEST: CPT | Performed by: INTERNAL MEDICINE

## 2022-01-01 PROCEDURE — 36415 COLL VENOUS BLD VENIPUNCTURE: CPT | Performed by: HOSPITALIST

## 2022-01-01 PROCEDURE — 36000 PLACE NEEDLE IN VEIN: CPT

## 2022-01-01 PROCEDURE — 25000242 PHARM REV CODE 250 ALT 637 W/ HCPCS: Performed by: HOSPITALIST

## 2022-01-01 PROCEDURE — 99233 PR SUBSEQUENT HOSPITAL CARE,LEVL III: ICD-10-PCS | Mod: ,,, | Performed by: PSYCHIATRY & NEUROLOGY

## 2022-01-01 PROCEDURE — 82803 BLOOD GASES ANY COMBINATION: CPT

## 2022-01-01 PROCEDURE — 85025 COMPLETE CBC W/AUTO DIFF WBC: CPT | Performed by: HOSPITALIST

## 2022-01-01 PROCEDURE — 30200315 PPD INTRADERMAL TEST REV CODE 302: Performed by: INTERNAL MEDICINE

## 2022-01-01 PROCEDURE — 93010 ELECTROCARDIOGRAM REPORT: CPT | Mod: ,,, | Performed by: INTERNAL MEDICINE

## 2022-01-01 PROCEDURE — 94640 AIRWAY INHALATION TREATMENT: CPT

## 2022-01-01 PROCEDURE — 97530 THERAPEUTIC ACTIVITIES: CPT

## 2022-01-01 PROCEDURE — 90792 PSYCH DIAG EVAL W/MED SRVCS: CPT | Mod: ,,, | Performed by: PSYCHIATRY & NEUROLOGY

## 2022-01-01 PROCEDURE — 85025 COMPLETE CBC W/AUTO DIFF WBC: CPT | Mod: 91 | Performed by: EMERGENCY MEDICINE

## 2022-01-01 PROCEDURE — 97116 GAIT TRAINING THERAPY: CPT | Mod: CQ

## 2022-01-01 PROCEDURE — 20000000 HC ICU ROOM

## 2022-01-01 PROCEDURE — 83036 HEMOGLOBIN GLYCOSYLATED A1C: CPT | Performed by: INTERNAL MEDICINE

## 2022-01-01 PROCEDURE — 99233 SBSQ HOSP IP/OBS HIGH 50: CPT | Mod: ,,, | Performed by: REGISTERED NURSE

## 2022-01-01 PROCEDURE — 97530 THERAPEUTIC ACTIVITIES: CPT | Mod: CO

## 2022-01-01 PROCEDURE — 82607 VITAMIN B-12: CPT | Performed by: HOSPITALIST

## 2022-01-01 PROCEDURE — 84100 ASSAY OF PHOSPHORUS: CPT | Performed by: HOSPITALIST

## 2022-01-01 PROCEDURE — 87086 URINE CULTURE/COLONY COUNT: CPT | Performed by: INTERNAL MEDICINE

## 2022-01-01 PROCEDURE — 94761 N-INVAS EAR/PLS OXIMETRY MLT: CPT

## 2022-01-01 PROCEDURE — 84484 ASSAY OF TROPONIN QUANT: CPT | Performed by: INTERNAL MEDICINE

## 2022-01-01 PROCEDURE — 90792 PR PSYCHIATRIC DIAGNOSTIC EVALUATION W/MEDICAL SERVICES: ICD-10-PCS | Mod: ,,, | Performed by: PSYCHIATRY & NEUROLOGY

## 2022-01-01 PROCEDURE — 97161 PT EVAL LOW COMPLEX 20 MIN: CPT

## 2022-01-01 PROCEDURE — G0180 MD CERTIFICATION HHA PATIENT: HCPCS | Mod: ,,, | Performed by: FAMILY MEDICINE

## 2022-01-01 PROCEDURE — 99291 PR CRITICAL CARE, E/M 30-74 MINUTES: ICD-10-PCS | Mod: ,,, | Performed by: INTERNAL MEDICINE

## 2022-01-01 PROCEDURE — 99223 PR INITIAL HOSPITAL CARE,LEVL III: ICD-10-PCS | Mod: ,,, | Performed by: REGISTERED NURSE

## 2022-01-01 PROCEDURE — 97116 GAIT TRAINING THERAPY: CPT

## 2022-01-01 PROCEDURE — 99497 ADVNCD CARE PLAN 30 MIN: CPT | Mod: ,,, | Performed by: REGISTERED NURSE

## 2022-01-01 PROCEDURE — 83605 ASSAY OF LACTIC ACID: CPT | Performed by: INTERNAL MEDICINE

## 2022-01-01 PROCEDURE — 97110 THERAPEUTIC EXERCISES: CPT

## 2022-01-01 PROCEDURE — 99497 PR ADVNCD CARE PLAN 30 MIN: ICD-10-PCS | Mod: 25,,, | Performed by: REGISTERED NURSE

## 2022-01-01 PROCEDURE — 99285 EMERGENCY DEPT VISIT HI MDM: CPT | Mod: 25

## 2022-01-01 PROCEDURE — 80143 DRUG ASSAY ACETAMINOPHEN: CPT | Performed by: EMERGENCY MEDICINE

## 2022-01-01 PROCEDURE — 80179 DRUG ASSAY SALICYLATE: CPT | Performed by: EMERGENCY MEDICINE

## 2022-01-01 PROCEDURE — 99497 PR ADVNCD CARE PLAN 30 MIN: ICD-10-PCS | Mod: ,,, | Performed by: REGISTERED NURSE

## 2022-01-01 PROCEDURE — 97110 THERAPEUTIC EXERCISES: CPT | Mod: CQ

## 2022-01-01 PROCEDURE — U0002 COVID-19 LAB TEST NON-CDC: HCPCS | Performed by: EMERGENCY MEDICINE

## 2022-01-01 PROCEDURE — 85025 COMPLETE CBC W/AUTO DIFF WBC: CPT | Performed by: EMERGENCY MEDICINE

## 2022-01-01 PROCEDURE — 84484 ASSAY OF TROPONIN QUANT: CPT | Performed by: EMERGENCY MEDICINE

## 2022-01-01 PROCEDURE — 80053 COMPREHEN METABOLIC PANEL: CPT | Mod: 91 | Performed by: EMERGENCY MEDICINE

## 2022-01-01 PROCEDURE — 93005 ELECTROCARDIOGRAM TRACING: CPT | Mod: 59 | Performed by: INTERNAL MEDICINE

## 2022-01-01 PROCEDURE — 99233 SBSQ HOSP IP/OBS HIGH 50: CPT | Mod: ,,, | Performed by: PSYCHIATRY & NEUROLOGY

## 2022-01-01 PROCEDURE — 83880 ASSAY OF NATRIURETIC PEPTIDE: CPT | Performed by: EMERGENCY MEDICINE

## 2022-01-01 PROCEDURE — 84484 ASSAY OF TROPONIN QUANT: CPT | Mod: 91 | Performed by: EMERGENCY MEDICINE

## 2022-01-01 PROCEDURE — 99233 PR SUBSEQUENT HOSPITAL CARE,LEVL III: ICD-10-PCS | Mod: ,,, | Performed by: REGISTERED NURSE

## 2022-01-01 PROCEDURE — 82077 ASSAY SPEC XCP UR&BREATH IA: CPT | Performed by: EMERGENCY MEDICINE

## 2022-01-01 PROCEDURE — 99497 ADVNCD CARE PLAN 30 MIN: CPT | Mod: 25,,, | Performed by: REGISTERED NURSE

## 2022-01-01 PROCEDURE — 83735 ASSAY OF MAGNESIUM: CPT | Mod: 91 | Performed by: EMERGENCY MEDICINE

## 2022-01-01 PROCEDURE — 97166 OT EVAL MOD COMPLEX 45 MIN: CPT

## 2022-01-01 PROCEDURE — 99222 PR INITIAL HOSPITAL CARE,LEVL II: ICD-10-PCS | Mod: ,,, | Performed by: PSYCHIATRY & NEUROLOGY

## 2022-01-01 PROCEDURE — 99232 PR SUBSEQUENT HOSPITAL CARE,LEVL II: ICD-10-PCS | Mod: ,,, | Performed by: PSYCHIATRY & NEUROLOGY

## 2022-01-01 PROCEDURE — 96375 TX/PRO/DX INJ NEW DRUG ADDON: CPT

## 2022-01-01 PROCEDURE — 92610 EVALUATE SWALLOWING FUNCTION: CPT

## 2022-01-01 PROCEDURE — 83036 POCT HEMOGLOBIN A1C: ICD-10-PCS | Mod: QW,,, | Performed by: PHYSICIAN ASSISTANT

## 2022-01-01 PROCEDURE — 84100 ASSAY OF PHOSPHORUS: CPT | Mod: 91 | Performed by: EMERGENCY MEDICINE

## 2022-01-01 PROCEDURE — G0180 PR HOME HEALTH MD CERTIFICATION: ICD-10-PCS | Mod: ,,, | Performed by: FAMILY MEDICINE

## 2022-01-01 PROCEDURE — 82533 TOTAL CORTISOL: CPT | Performed by: INTERNAL MEDICINE

## 2022-01-01 PROCEDURE — 99900035 HC TECH TIME PER 15 MIN (STAT)

## 2022-01-01 PROCEDURE — 84439 ASSAY OF FREE THYROXINE: CPT | Performed by: EMERGENCY MEDICINE

## 2022-01-01 PROCEDURE — 82024 ASSAY OF ACTH: CPT | Performed by: INTERNAL MEDICINE

## 2022-01-01 PROCEDURE — 82746 ASSAY OF FOLIC ACID SERUM: CPT | Performed by: HOSPITALIST

## 2022-01-01 PROCEDURE — 83036 HEMOGLOBIN GLYCOSYLATED A1C: CPT | Mod: QW,,, | Performed by: PHYSICIAN ASSISTANT

## 2022-01-01 PROCEDURE — 63600175 PHARM REV CODE 636 W HCPCS: Performed by: EMERGENCY MEDICINE

## 2022-01-01 PROCEDURE — 80307 DRUG TEST PRSMV CHEM ANLYZR: CPT | Performed by: EMERGENCY MEDICINE

## 2022-01-01 PROCEDURE — 80202 ASSAY OF VANCOMYCIN: CPT | Performed by: INTERNAL MEDICINE

## 2022-01-01 PROCEDURE — 96374 THER/PROPH/DIAG INJ IV PUSH: CPT

## 2022-01-01 PROCEDURE — 87040 BLOOD CULTURE FOR BACTERIA: CPT | Performed by: EMERGENCY MEDICINE

## 2022-01-01 PROCEDURE — 99222 1ST HOSP IP/OBS MODERATE 55: CPT | Mod: ,,, | Performed by: PSYCHIATRY & NEUROLOGY

## 2022-01-01 PROCEDURE — 81003 URINALYSIS AUTO W/O SCOPE: CPT | Mod: 59 | Performed by: EMERGENCY MEDICINE

## 2022-01-01 PROCEDURE — 93005 ELECTROCARDIOGRAM TRACING: CPT

## 2022-01-01 PROCEDURE — 99232 SBSQ HOSP IP/OBS MODERATE 35: CPT | Mod: ,,, | Performed by: PSYCHIATRY & NEUROLOGY

## 2022-01-01 PROCEDURE — 80053 COMPREHEN METABOLIC PANEL: CPT | Performed by: INTERNAL MEDICINE

## 2022-01-01 PROCEDURE — 99215 OFFICE O/P EST HI 40 MIN: CPT | Mod: S$GLB,,, | Performed by: PHYSICIAN ASSISTANT

## 2022-01-01 PROCEDURE — 99223 1ST HOSP IP/OBS HIGH 75: CPT | Mod: ,,, | Performed by: REGISTERED NURSE

## 2022-01-01 RX ORDER — NYSTATIN 100000 U/G
CREAM TOPICAL 2 TIMES DAILY
Status: DISCONTINUED | OUTPATIENT
Start: 2022-01-01 | End: 2022-01-01 | Stop reason: HOSPADM

## 2022-01-01 RX ORDER — SENNOSIDES 8.6 MG/1
8.6 TABLET ORAL ONCE
Status: COMPLETED | OUTPATIENT
Start: 2022-01-01 | End: 2022-01-01

## 2022-01-01 RX ORDER — MUPIROCIN 20 MG/G
OINTMENT TOPICAL 3 TIMES DAILY
Qty: 30 G | Refills: 2 | Status: SHIPPED | OUTPATIENT
Start: 2022-01-01 | End: 2023-01-01

## 2022-01-01 RX ORDER — NALOXONE HCL 0.4 MG/ML
0.02 VIAL (ML) INJECTION
Status: DISCONTINUED | OUTPATIENT
Start: 2022-01-01 | End: 2022-01-01 | Stop reason: HOSPADM

## 2022-01-01 RX ORDER — ONDANSETRON 2 MG/ML
4 INJECTION INTRAMUSCULAR; INTRAVENOUS EVERY 8 HOURS PRN
Status: DISCONTINUED | OUTPATIENT
Start: 2022-01-01 | End: 2022-01-01 | Stop reason: HOSPADM

## 2022-01-01 RX ORDER — MUPIROCIN 20 MG/G
OINTMENT TOPICAL 2 TIMES DAILY
Status: DISPENSED | OUTPATIENT
Start: 2022-01-01 | End: 2022-01-01

## 2022-01-01 RX ORDER — MAGNESIUM SULFATE HEPTAHYDRATE 40 MG/ML
2 INJECTION, SOLUTION INTRAVENOUS ONCE
Status: COMPLETED | OUTPATIENT
Start: 2022-01-01 | End: 2022-01-01

## 2022-01-01 RX ORDER — TALC
6 POWDER (GRAM) TOPICAL NIGHTLY
Status: DISCONTINUED | OUTPATIENT
Start: 2022-01-01 | End: 2022-01-01 | Stop reason: HOSPADM

## 2022-01-01 RX ORDER — FUROSEMIDE 40 MG/1
40 TABLET ORAL DAILY
COMMUNITY
End: 2023-01-01 | Stop reason: SDUPTHER

## 2022-01-01 RX ORDER — SODIUM CHLORIDE 9 MG/ML
INJECTION, SOLUTION INTRAVENOUS CONTINUOUS
Status: ACTIVE | OUTPATIENT
Start: 2022-01-01 | End: 2022-01-01

## 2022-01-01 RX ORDER — SODIUM CHLORIDE 9 MG/ML
INJECTION, SOLUTION INTRAVENOUS ONCE
Status: COMPLETED | OUTPATIENT
Start: 2022-01-01 | End: 2022-01-01

## 2022-01-01 RX ORDER — ACETAMINOPHEN 650 MG/1
650 SUPPOSITORY RECTAL EVERY 4 HOURS PRN
Status: DISCONTINUED | OUTPATIENT
Start: 2022-01-01 | End: 2022-01-01 | Stop reason: HOSPADM

## 2022-01-01 RX ORDER — AMLODIPINE BESYLATE 5 MG/1
5 TABLET ORAL NIGHTLY
Qty: 90 TABLET | Refills: 1 | Status: SHIPPED | OUTPATIENT
Start: 2022-01-01 | End: 2023-01-01

## 2022-01-01 RX ORDER — DEXAMETHASONE SODIUM PHOSPHATE 4 MG/ML
4 INJECTION, SOLUTION INTRA-ARTICULAR; INTRALESIONAL; INTRAMUSCULAR; INTRAVENOUS; SOFT TISSUE EVERY 12 HOURS
Status: DISCONTINUED | OUTPATIENT
Start: 2022-01-01 | End: 2022-01-01

## 2022-01-01 RX ORDER — NOREPINEPHRINE BITARTRATE/D5W 4MG/250ML
0-3 PLASTIC BAG, INJECTION (ML) INTRAVENOUS CONTINUOUS
Status: DISCONTINUED | OUTPATIENT
Start: 2022-01-01 | End: 2022-01-01

## 2022-01-01 RX ORDER — GLIPIZIDE 2.5 MG/1
2.5 TABLET, EXTENDED RELEASE ORAL
Qty: 90 TABLET | Refills: 1 | Status: SHIPPED | OUTPATIENT
Start: 2022-01-01 | End: 2023-01-01 | Stop reason: SDUPTHER

## 2022-01-01 RX ORDER — SODIUM CHLORIDE 9 MG/ML
INJECTION, SOLUTION INTRAVENOUS CONTINUOUS
Status: DISCONTINUED | OUTPATIENT
Start: 2022-01-01 | End: 2022-01-01

## 2022-01-01 RX ORDER — IPRATROPIUM BROMIDE AND ALBUTEROL SULFATE 2.5; .5 MG/3ML; MG/3ML
3 SOLUTION RESPIRATORY (INHALATION) EVERY 6 HOURS
Status: DISCONTINUED | OUTPATIENT
Start: 2022-01-01 | End: 2022-01-01

## 2022-01-01 RX ORDER — GABAPENTIN 100 MG/1
100 CAPSULE ORAL DAILY
Qty: 90 CAPSULE | Refills: 1 | Status: SHIPPED | OUTPATIENT
Start: 2022-01-01 | End: 2023-01-01

## 2022-01-01 RX ORDER — IPRATROPIUM BROMIDE AND ALBUTEROL SULFATE 2.5; .5 MG/3ML; MG/3ML
3 SOLUTION RESPIRATORY (INHALATION) EVERY 4 HOURS PRN
Status: DISCONTINUED | OUTPATIENT
Start: 2022-01-01 | End: 2022-01-01 | Stop reason: HOSPADM

## 2022-01-01 RX ORDER — RISPERIDONE 0.5 MG/1
0.5 TABLET ORAL NIGHTLY
COMMUNITY
End: 2023-01-01

## 2022-01-01 RX ORDER — DEXTROMETHORPHAN POLISTIREX 30 MG/5 ML
1 SUSPENSION, EXTENDED RELEASE 12 HR ORAL ONCE
Status: COMPLETED | OUTPATIENT
Start: 2022-01-01 | End: 2022-01-01

## 2022-01-01 RX ORDER — GLUCAGON 1 MG
1 KIT INJECTION
Status: DISCONTINUED | OUTPATIENT
Start: 2022-01-01 | End: 2022-01-01 | Stop reason: HOSPADM

## 2022-01-01 RX ORDER — COSYNTROPIN 0.25 MG/ML
0.25 INJECTION, POWDER, FOR SOLUTION INTRAMUSCULAR; INTRAVENOUS ONCE
Status: COMPLETED | OUTPATIENT
Start: 2022-01-01 | End: 2022-01-01

## 2022-01-01 RX ORDER — METFORMIN HYDROCHLORIDE 500 MG/1
500 TABLET, EXTENDED RELEASE ORAL
Qty: 90 TABLET | Refills: 1 | Status: SHIPPED | OUTPATIENT
Start: 2022-01-01 | End: 2023-01-01 | Stop reason: SDUPTHER

## 2022-01-01 RX ORDER — LOSARTAN POTASSIUM 100 MG/1
100 TABLET ORAL DAILY
COMMUNITY
End: 2023-01-01

## 2022-01-01 RX ORDER — SODIUM CHLORIDE 9 MG/ML
1000 INJECTION, SOLUTION INTRAVENOUS
Status: COMPLETED | OUTPATIENT
Start: 2022-01-01 | End: 2022-01-01

## 2022-01-01 RX ORDER — POLYETHYLENE GLYCOL 3350 17 G/17G
17 POWDER, FOR SOLUTION ORAL ONCE
Status: COMPLETED | OUTPATIENT
Start: 2022-01-01 | End: 2022-01-01

## 2022-01-01 RX ORDER — CLOTRIMAZOLE 1 %
1 CREAM (GRAM) TOPICAL 3 TIMES DAILY
COMMUNITY
End: 2023-01-01

## 2022-01-01 RX ORDER — POTASSIUM CHLORIDE 750 MG/1
10 TABLET, EXTENDED RELEASE ORAL DAILY
Qty: 30 TABLET | Refills: 5 | Status: SHIPPED | OUTPATIENT
Start: 2022-01-01 | End: 2023-01-01 | Stop reason: SDUPTHER

## 2022-01-01 RX ORDER — INSULIN ASPART 100 [IU]/ML
0-5 INJECTION, SOLUTION INTRAVENOUS; SUBCUTANEOUS
Status: DISCONTINUED | OUTPATIENT
Start: 2022-01-01 | End: 2022-01-01 | Stop reason: HOSPADM

## 2022-01-01 RX ORDER — METOPROLOL SUCCINATE 25 MG/1
12.5 TABLET, EXTENDED RELEASE ORAL DAILY
Qty: 45 TABLET | Refills: 1 | Status: SHIPPED | OUTPATIENT
Start: 2022-01-01 | End: 2023-01-01 | Stop reason: SDUPTHER

## 2022-01-01 RX ORDER — METOPROLOL TARTRATE 25 MG/1
12.5 TABLET ORAL 2 TIMES DAILY
Status: DISCONTINUED | OUTPATIENT
Start: 2022-01-01 | End: 2022-01-01 | Stop reason: HOSPADM

## 2022-01-01 RX ORDER — INSULIN ASPART 100 [IU]/ML
0-5 INJECTION, SOLUTION INTRAVENOUS; SUBCUTANEOUS EVERY 6 HOURS PRN
Status: DISCONTINUED | OUTPATIENT
Start: 2022-01-01 | End: 2022-01-01

## 2022-01-01 RX ORDER — ATORVASTATIN CALCIUM 40 MG/1
40 TABLET, FILM COATED ORAL NIGHTLY
Qty: 90 TABLET | Refills: 1 | Status: SHIPPED | OUTPATIENT
Start: 2022-01-01 | End: 2022-01-01

## 2022-01-01 RX ORDER — BISACODYL 10 MG
10 SUPPOSITORY, RECTAL RECTAL DAILY PRN
Status: DISCONTINUED | OUTPATIENT
Start: 2022-01-01 | End: 2022-01-01 | Stop reason: HOSPADM

## 2022-01-01 RX ORDER — SODIUM CHLORIDE 0.9 % (FLUSH) 0.9 %
10 SYRINGE (ML) INJECTION
Status: DISCONTINUED | OUTPATIENT
Start: 2022-01-01 | End: 2022-01-01

## 2022-01-01 RX ORDER — ATORVASTATIN CALCIUM 40 MG/1
40 TABLET, FILM COATED ORAL NIGHTLY
Qty: 90 TABLET | Refills: 0 | Status: SHIPPED | OUTPATIENT
Start: 2022-01-01 | End: 2022-01-01 | Stop reason: SDUPTHER

## 2022-01-01 RX ORDER — SODIUM CHLORIDE 0.9 % (FLUSH) 0.9 %
10 SYRINGE (ML) INJECTION EVERY 6 HOURS
Status: DISCONTINUED | OUTPATIENT
Start: 2022-01-01 | End: 2022-01-01

## 2022-01-01 RX ORDER — HEPARIN SODIUM 5000 [USP'U]/ML
5000 INJECTION, SOLUTION INTRAVENOUS; SUBCUTANEOUS EVERY 8 HOURS
Status: DISCONTINUED | OUTPATIENT
Start: 2022-01-01 | End: 2022-01-01

## 2022-01-01 RX ORDER — PROCHLORPERAZINE EDISYLATE 5 MG/ML
5 INJECTION INTRAMUSCULAR; INTRAVENOUS EVERY 6 HOURS PRN
Status: DISCONTINUED | OUTPATIENT
Start: 2022-01-01 | End: 2022-01-01 | Stop reason: HOSPADM

## 2022-01-01 RX ORDER — SODIUM CHLORIDE 0.9 % (FLUSH) 0.9 %
10 SYRINGE (ML) INJECTION EVERY 12 HOURS PRN
Status: DISCONTINUED | OUTPATIENT
Start: 2022-01-01 | End: 2022-01-01 | Stop reason: HOSPADM

## 2022-01-01 RX ORDER — OLMESARTAN MEDOXOMIL 40 MG/1
40 TABLET ORAL DAILY
Qty: 90 TABLET | Refills: 1 | Status: SHIPPED | OUTPATIENT
Start: 2022-01-01 | End: 2022-01-01 | Stop reason: CLARIF

## 2022-01-01 RX ADMIN — SODIUM CHLORIDE: 0.9 INJECTION, SOLUTION INTRAVENOUS at 12:12

## 2022-01-01 RX ADMIN — MUPIROCIN: 20 OINTMENT TOPICAL at 09:12

## 2022-01-01 RX ADMIN — CEFTRIAXONE SODIUM 2 G: 2 INJECTION, POWDER, FOR SOLUTION INTRAMUSCULAR; INTRAVENOUS at 08:12

## 2022-01-01 RX ADMIN — METOPROLOL TARTRATE 12.5 MG: 25 TABLET, FILM COATED ORAL at 09:12

## 2022-01-01 RX ADMIN — METOPROLOL TARTRATE 12.5 MG: 25 TABLET, FILM COATED ORAL at 08:12

## 2022-01-01 RX ADMIN — MUPIROCIN: 20 OINTMENT TOPICAL at 08:12

## 2022-01-01 RX ADMIN — APIXABAN 2.5 MG: 2.5 TABLET, FILM COATED ORAL at 01:12

## 2022-01-01 RX ADMIN — SODIUM CHLORIDE 500 ML: 0.9 INJECTION, SOLUTION INTRAVENOUS at 04:12

## 2022-01-01 RX ADMIN — Medication 6 MG: at 09:12

## 2022-01-01 RX ADMIN — APIXABAN 2.5 MG: 2.5 TABLET, FILM COATED ORAL at 08:12

## 2022-01-01 RX ADMIN — PIPERACILLIN AND TAZOBACTAM 4.5 G: 4; .5 INJECTION, POWDER, LYOPHILIZED, FOR SOLUTION INTRAVENOUS; PARENTERAL at 02:12

## 2022-01-01 RX ADMIN — APIXABAN 2.5 MG: 2.5 TABLET, FILM COATED ORAL at 09:12

## 2022-01-01 RX ADMIN — Medication 6 MG: at 08:12

## 2022-01-01 RX ADMIN — MAGNESIUM SULFATE HEPTAHYDRATE 2 G: 40 INJECTION, SOLUTION INTRAVENOUS at 01:12

## 2022-01-01 RX ADMIN — SENNOSIDES 8.6 MG: 8.6 TABLET, FILM COATED ORAL at 10:12

## 2022-01-01 RX ADMIN — SODIUM CHLORIDE: 0.9 INJECTION, SOLUTION INTRAVENOUS at 02:12

## 2022-01-01 RX ADMIN — MINERAL OIL 1 ENEMA: 100 ENEMA RECTAL at 02:12

## 2022-01-01 RX ADMIN — MUPIROCIN: 20 OINTMENT TOPICAL at 10:12

## 2022-01-01 RX ADMIN — SODIUM CHLORIDE: 0.9 INJECTION, SOLUTION INTRAVENOUS at 05:12

## 2022-01-01 RX ADMIN — CEFTRIAXONE 1 G: 1 INJECTION, SOLUTION INTRAVENOUS at 01:12

## 2022-01-01 RX ADMIN — Medication 0.02 MCG/KG/MIN: at 03:12

## 2022-01-01 RX ADMIN — GLUCAGON HYDROCHLORIDE 5 MG: 1 INJECTION, POWDER, FOR SOLUTION INTRAMUSCULAR; INTRAVENOUS; SUBCUTANEOUS at 02:12

## 2022-01-01 RX ADMIN — NYSTATIN: 100000 CREAM TOPICAL at 03:12

## 2022-01-01 RX ADMIN — HEPARIN SODIUM 5000 UNITS: 5000 INJECTION INTRAVENOUS; SUBCUTANEOUS at 06:12

## 2022-01-01 RX ADMIN — SODIUM CHLORIDE: 0.9 INJECTION, SOLUTION INTRAVENOUS at 10:12

## 2022-01-01 RX ADMIN — COSYNTROPIN 0.25 MG: 0.25 INJECTION, POWDER, LYOPHILIZED, FOR SOLUTION INTRAVENOUS at 06:12

## 2022-01-01 RX ADMIN — LEVOTHYROXINE SODIUM 25 MCG: 20 INJECTION, SOLUTION INTRAVENOUS at 09:12

## 2022-01-01 RX ADMIN — SODIUM CHLORIDE: 0.9 INJECTION, SOLUTION INTRAVENOUS at 06:12

## 2022-01-01 RX ADMIN — IPRATROPIUM BROMIDE AND ALBUTEROL SULFATE 3 ML: .5; 3 SOLUTION RESPIRATORY (INHALATION) at 07:12

## 2022-01-01 RX ADMIN — LEVOTHYROXINE SODIUM 25 MCG: 20 INJECTION, SOLUTION INTRAVENOUS at 08:12

## 2022-01-01 RX ADMIN — VANCOMYCIN HYDROCHLORIDE 1750 MG: 500 INJECTION, POWDER, LYOPHILIZED, FOR SOLUTION INTRAVENOUS at 03:12

## 2022-01-01 RX ADMIN — DEXAMETHASONE SODIUM PHOSPHATE 4 MG: 4 INJECTION, SOLUTION INTRA-ARTICULAR; INTRALESIONAL; INTRAMUSCULAR; INTRAVENOUS; SOFT TISSUE at 05:12

## 2022-01-01 RX ADMIN — Medication 6 MG: at 10:12

## 2022-01-01 RX ADMIN — SODIUM CHLORIDE 1000 ML: 0.9 INJECTION, SOLUTION INTRAVENOUS at 02:12

## 2022-01-01 RX ADMIN — POLYETHYLENE GLYCOL 3350 17 G: 17 POWDER, FOR SOLUTION ORAL at 10:12

## 2022-01-01 RX ADMIN — SODIUM CHLORIDE: 0.9 INJECTION, SOLUTION INTRAVENOUS at 08:12

## 2022-01-01 RX ADMIN — SODIUM CHLORIDE 1000 ML: 0.9 INJECTION, SOLUTION INTRAVENOUS at 03:12

## 2022-01-01 RX ADMIN — APIXABAN 2.5 MG: 2.5 TABLET, FILM COATED ORAL at 10:12

## 2022-01-01 RX ADMIN — TUBERCULIN PURIFIED PROTEIN DERIVATIVE 5 UNITS: 5 INJECTION, SOLUTION INTRADERMAL at 10:12

## 2022-01-01 RX ADMIN — METOPROLOL TARTRATE 12.5 MG: 25 TABLET, FILM COATED ORAL at 10:12

## 2022-01-26 ENCOUNTER — TELEPHONE (OUTPATIENT)
Dept: FAMILY MEDICINE | Facility: CLINIC | Age: 87
End: 2022-01-26

## 2022-01-26 NOTE — TELEPHONE ENCOUNTER
----- Message from Aissatou Reid sent at 1/26/2022 10:00 AM CST -----  Pt calling @ 10:00 said she is going to be able to come to this appt today want sto cancel.  # [phone]

## 2022-02-04 DIAGNOSIS — E11.69 DM TYPE 2 WITH DIABETIC DYSLIPIDEMIA: ICD-10-CM

## 2022-02-04 DIAGNOSIS — E78.5 DM TYPE 2 WITH DIABETIC DYSLIPIDEMIA: ICD-10-CM

## 2022-02-04 RX ORDER — METFORMIN HYDROCHLORIDE 500 MG/1
500 TABLET, EXTENDED RELEASE ORAL
Qty: 90 TABLET | Refills: 1 | Status: SHIPPED | OUTPATIENT
Start: 2022-02-04 | End: 2022-03-10 | Stop reason: SDUPTHER

## 2022-02-04 NOTE — TELEPHONE ENCOUNTER
----- Message from Analia Sethi sent at 2/4/2022 11:49 AM CST -----  Vm- daughter saina is calling for refill on metformin   [facility]   [phone]

## 2022-02-28 ENCOUNTER — TELEPHONE (OUTPATIENT)
Dept: FAMILY MEDICINE | Facility: CLINIC | Age: 87
End: 2022-02-28
Payer: MEDICARE

## 2022-02-28 NOTE — TELEPHONE ENCOUNTER
----- Message from Analia Sethi sent at 2/28/2022  1:38 PM CST -----  Vm- daughter sania is calling and needs refill on gabapentin, olmesartan, amlodipine, glipizide   [facility]   [phone]

## 2022-03-03 DIAGNOSIS — I10 ESSENTIAL HYPERTENSION: ICD-10-CM

## 2022-03-03 RX ORDER — AMLODIPINE BESYLATE 5 MG/1
5 TABLET ORAL NIGHTLY
Qty: 90 TABLET | Refills: 0 | Status: SHIPPED | OUTPATIENT
Start: 2022-03-03 | End: 2022-03-10 | Stop reason: SDUPTHER

## 2022-03-03 NOTE — TELEPHONE ENCOUNTER
Pt notified 1 month supply will be sent in. She will need to come in for refills. She voiced understanding. She will call back to schedule an appt.

## 2022-03-03 NOTE — TELEPHONE ENCOUNTER
----- Message from Analia Sethi sent at 3/3/2022  9:44 AM CST -----  - pt needs refill on amlodipine   [phone]

## 2022-03-10 ENCOUNTER — CLINICAL SUPPORT (OUTPATIENT)
Dept: FAMILY MEDICINE | Facility: CLINIC | Age: 87
End: 2022-03-10
Payer: MEDICARE

## 2022-03-10 DIAGNOSIS — E78.5 DYSLIPIDEMIA: ICD-10-CM

## 2022-03-10 DIAGNOSIS — I10 ESSENTIAL HYPERTENSION: ICD-10-CM

## 2022-03-10 DIAGNOSIS — E11.69 DM TYPE 2 WITH DIABETIC DYSLIPIDEMIA: ICD-10-CM

## 2022-03-10 DIAGNOSIS — E78.5 DM TYPE 2 WITH DIABETIC DYSLIPIDEMIA: ICD-10-CM

## 2022-03-10 DIAGNOSIS — G62.9 NEUROPATHY: ICD-10-CM

## 2022-03-10 RX ORDER — ATORVASTATIN CALCIUM 40 MG/1
40 TABLET, FILM COATED ORAL NIGHTLY
Qty: 90 TABLET | Refills: 0 | Status: SHIPPED | OUTPATIENT
Start: 2022-03-10 | End: 2022-01-01 | Stop reason: SDUPTHER

## 2022-03-10 RX ORDER — AMLODIPINE BESYLATE 5 MG/1
5 TABLET ORAL NIGHTLY
Qty: 90 TABLET | Refills: 0 | Status: SHIPPED | OUTPATIENT
Start: 2022-03-10 | End: 2022-01-01 | Stop reason: SDUPTHER

## 2022-03-10 RX ORDER — OLMESARTAN MEDOXOMIL 40 MG/1
40 TABLET ORAL DAILY
Qty: 90 TABLET | Refills: 1 | Status: SHIPPED | OUTPATIENT
Start: 2022-03-10 | End: 2022-01-01 | Stop reason: SDUPTHER

## 2022-03-10 RX ORDER — METOPROLOL SUCCINATE 25 MG/1
12.5 TABLET, EXTENDED RELEASE ORAL DAILY
Qty: 45 TABLET | Refills: 0 | Status: SHIPPED | OUTPATIENT
Start: 2022-03-10 | End: 2022-01-01 | Stop reason: SDUPTHER

## 2022-03-10 RX ORDER — GABAPENTIN 100 MG/1
100 CAPSULE ORAL DAILY
Qty: 90 CAPSULE | Refills: 0 | Status: SHIPPED | OUTPATIENT
Start: 2022-03-10 | End: 2022-06-01 | Stop reason: SDUPTHER

## 2022-03-10 RX ORDER — METFORMIN HYDROCHLORIDE 500 MG/1
500 TABLET, EXTENDED RELEASE ORAL
Qty: 90 TABLET | Refills: 0 | Status: SHIPPED | OUTPATIENT
Start: 2022-03-10 | End: 2022-01-01 | Stop reason: SDUPTHER

## 2022-03-15 RX ORDER — FLECAINIDE ACETATE 50 MG/1
50 TABLET ORAL EVERY 12 HOURS
Qty: 180 TABLET | Refills: 3 | Status: SHIPPED | OUTPATIENT
Start: 2022-03-15 | End: 2023-01-01

## 2022-03-29 DIAGNOSIS — K64.9 HEMORRHOIDS, UNSPECIFIED HEMORRHOID TYPE: ICD-10-CM

## 2022-03-29 DIAGNOSIS — E78.5 DM TYPE 2 WITH DIABETIC DYSLIPIDEMIA: ICD-10-CM

## 2022-03-29 DIAGNOSIS — E11.69 DM TYPE 2 WITH DIABETIC DYSLIPIDEMIA: ICD-10-CM

## 2022-03-29 DIAGNOSIS — I87.2 VENOUS INSUFFICIENCY OF BOTH LOWER EXTREMITIES: ICD-10-CM

## 2022-03-29 RX ORDER — HYDROCORTISONE 25 MG/G
CREAM TOPICAL 2 TIMES DAILY
Qty: 28 G | Refills: 2 | Status: SHIPPED | OUTPATIENT
Start: 2022-03-29 | End: 2023-01-01

## 2022-03-29 RX ORDER — POTASSIUM CHLORIDE 750 MG/1
10 TABLET, EXTENDED RELEASE ORAL DAILY
Qty: 30 TABLET | Refills: 5 | Status: SHIPPED | OUTPATIENT
Start: 2022-03-29 | End: 2022-01-01 | Stop reason: SDUPTHER

## 2022-03-29 RX ORDER — GLIPIZIDE 2.5 MG/1
2.5 TABLET, EXTENDED RELEASE ORAL
Qty: 30 TABLET | Refills: 6 | Status: SHIPPED | OUTPATIENT
Start: 2022-03-29 | End: 2022-01-01 | Stop reason: SDUPTHER

## 2022-03-29 RX ORDER — FUROSEMIDE 40 MG/1
20 TABLET ORAL DAILY
Qty: 45 TABLET | Refills: 6 | Status: SHIPPED | OUTPATIENT
Start: 2022-03-29 | End: 2022-01-01 | Stop reason: CLARIF

## 2022-03-29 NOTE — TELEPHONE ENCOUNTER
----- Message from Eva Kim sent at 3/29/2022 11:49 AM CDT -----  Patient daughter (Jody)called and stated that the patient need a refill of her potassium chloride,furosemide,glipizide, and her hydrocortisone [facility] into [facility] on [address] if any questions please give her a call at [phone]

## 2022-04-29 NOTE — TELEPHONE ENCOUNTER
----- Message from Phoebe Richard sent at 4/29/2022  7:57 AM CDT -----  Contact: pt  Type: Needs Medical Advice    Who: Called: pt     Best Call Back Number: [phone]    Inquiry/Question: pt [facility] called or faxed a refill request for her apixaban (ELIQUIS) 5 mg Tab and have not heard anything, I could not do a refill request that pharmacy is not listed and theres 3 different ones listed and she does not know which one it is please advise  Thank you~

## 2022-06-01 DIAGNOSIS — G62.9 NEUROPATHY: ICD-10-CM

## 2022-06-01 RX ORDER — GABAPENTIN 100 MG/1
100 CAPSULE ORAL DAILY
Qty: 90 CAPSULE | Refills: 0 | Status: SHIPPED | OUTPATIENT
Start: 2022-06-01 | End: 2022-01-01 | Stop reason: SDUPTHER

## 2022-06-01 NOTE — TELEPHONE ENCOUNTER
----- Message from Aissatou Reid sent at 6/1/2022 12:38 PM CDT -----  Pt's daughter Jody calling for refills on Gabapentin sent to [facility] in chart said it has not changed  [phone]

## 2022-07-20 NOTE — TELEPHONE ENCOUNTER
----- Message from Eva Kim sent at 7/20/2022  8:26 AM CDT -----  Patient daughter (Jody)called and stated that she need a refill of her atorvastatin [facility] if any questions please give her a call at [phone]

## 2022-07-27 NOTE — TELEPHONE ENCOUNTER
----- Message from Arsenio Brower MA sent at 7/27/2022  8:49 AM CDT -----  Pt daughter calling for a refill on atorvastatin.

## 2022-07-27 NOTE — TELEPHONE ENCOUNTER
No number listed below for daughter, tried calling number in chart with no answer. Needs appointment.

## 2022-08-01 NOTE — TELEPHONE ENCOUNTER
----- Message from Eva Kim sent at 8/1/2022 10:32 AM CDT -----  Patient daughter (Jody)called and sated that the patient need a refill of her atorvastatin called into [facility] on [address]  if any questions please give her a call at [phone]

## 2022-08-09 NOTE — TELEPHONE ENCOUNTER
LMOR for pt to call back. Will have to come in for OV to discuss first. Will need f2f for insurance.

## 2022-08-09 NOTE — TELEPHONE ENCOUNTER
----- Message from Analia Sethi sent at 8/9/2022  2:15 PM CDT -----  Vm- daughter is calling and would like to talk to someone about getting home health   Jody [phone]

## 2022-08-26 NOTE — TELEPHONE ENCOUNTER
----- Message from Arsenio Brower MA sent at 8/26/2022 10:31 AM CDT -----  Pt calling to to confirm that someone will be waiting for her out front with a wheel chair? Her appt is tuesday 8/30 at 10 and no one has confirmed the wheelchair.#[phone]

## 2022-08-26 NOTE — TELEPHONE ENCOUNTER
Informed pt a nurse can help her out of the car when she gets to the office. Informed pt to call when she is at the front so we know she is ready. Pt voiced understanding.

## 2022-08-30 NOTE — PROGRESS NOTES
SUBJECTIVE:    Patient ID: Tierra Jennings is a 89 y.o. female.    Chief Complaint: Diabetes (Brought bottles, talk about legs and possible Home Health// SW)    Pt is a 89 y.o. female w/ an extensive PMHx who presents today for a 9 month follow up. She currently lives with her son and her meals are provided via [employer].  She ambulates with the use of a walker when in her home, and does not leave her home.  She is able to transfer herself from chair to shower to commode to bed with minimal difficulty. Fall prevention measure have been taken at her home. Today, she reports bilateral lower extremity edema that is constantly present despite taking her Lasix 20 mg q.d.. She is stationary throughout the day and does not wear compression stockings.  She denies any SOB, coughing, CP, or palpitations, but continues to experience lower extremity weakness. Regarding her DM II, A1c today is excellent at 5.9%.  She remains compliant with her medication regimen, but does not check her sugars daily.  BP is stable and well controlled.    Due for annual lab work.    No visits with results within 6 Month(s) from this visit.   Latest known visit with results is:   No results displayed because visit has over 200 results.          Past Medical History:   Diagnosis Date    Diabetes mellitus, type 2     Heart murmur     Hypertension     Stroke      Past Surgical History:   Procedure Laterality Date    EYE SURGERY      TONSILLECTOMY      VEIN LIGATION AND STRIPPING       Family History   Problem Relation Age of Onset    Heart disease Mother        Marital Status:   Alcohol History:  reports no history of alcohol use.  Tobacco History:  reports that she has never smoked. She has never used smokeless tobacco.  Drug History:  reports no history of drug use.    Health Maintenance Topics with due status: Not Due       Topic Last Completion Date    Influenza Vaccine 09/22/2021     Immunization History   Administered Date(s)  Administered    COVID-19, MRNA, LN-S, PF (Pfizer) (Purple Cap) 03/20/2021, 04/10/2021    Influenza 10/21/2013, 12/06/2014    Influenza (FLUAD) - Quadrivalent - Adjuvanted - PF *Preferred* (65+) 11/06/2020    Influenza - High Dose - PF (65 years and older) 12/26/2017, 10/13/2018, 10/22/2019    Influenza - Quadrivalent - High Dose - PF (65 years and older) 09/22/2021    Influenza - Quadrivalent - PF *Preferred* (6 months and older) 01/06/2003    Influenza - Trivalent - PF (ADULT) 12/06/2014    Pneumococcal Conjugate - 13 Valent 10/13/2016, 06/11/2019       Review of patient's allergies indicates:   Allergen Reactions    Aspirin Other (See Comments)     Bleeding    Codeine     Erythromycin Rash       Current Outpatient Medications:     apixaban (ELIQUIS) 5 mg Tab, Take 1 tablet (5 mg total) by mouth 2 (two) times daily., Disp: 180 tablet, Rfl: 3    blood sugar diagnostic (ONETOUCH ULTRA TEST) Strp, 1 strip by miscellaneous route 2 times daily. DX code 250.00 (Patient taking differently: 1 strip by Misc.(Non-Drug; Combo Route) route 2 (two) times daily. 1 strip by miscellaneous route 2 times daily. DX code 250.00), Disp: 100 strip, Rfl: 1    blood sugar diagnostic Strp, To check BG 2 times daily, to use with insurance preferred meter, Disp: 100 each, Rfl: 1    flecainide (TAMBOCOR) 50 MG Tab, Take 1 tablet (50 mg total) by mouth every 12 (twelve) hours., Disp: 180 tablet, Rfl: 3    furosemide (LASIX) 40 MG tablet, Take 0.5 tablets (20 mg total) by mouth once daily., Disp: 45 tablet, Rfl: 6    hydrocortisone (ANUSOL-HC) 2.5 % rectal cream, Place rectally 2 (two) times daily., Disp: 28 g, Rfl: 1    hydrocortisone 2.5 % cream, Apply topically 2 (two) times daily., Disp: 28 g, Rfl: 2    pantoprazole (PROTONIX) 40 MG tablet, Take 1 tablet (40 mg total) by mouth once daily., Disp: 90 tablet, Rfl: 1    amLODIPine (NORVASC) 5 MG tablet, Take 1 tablet (5 mg total) by mouth nightly., Disp: 90 tablet, Rfl: 1    atorvastatin  "(LIPITOR) 40 MG tablet, Take 1 tablet (40 mg total) by mouth nightly., Disp: 90 tablet, Rfl: 1    gabapentin (NEURONTIN) 100 MG capsule, Take 1 capsule (100 mg total) by mouth once daily., Disp: 90 capsule, Rfl: 1    glipiZIDE (GLUCOTROL) 2.5 MG TR24, Take 1 tablet (2.5 mg total) by mouth daily with breakfast., Disp: 90 tablet, Rfl: 1    metFORMIN (GLUCOPHAGE-XR) 500 MG ER 24hr tablet, Take 1 tablet (500 mg total) by mouth daily with breakfast., Disp: 90 tablet, Rfl: 1    metoprolol succinate (TOPROL-XL) 25 MG 24 hr tablet, Take 0.5 tablets (12.5 mg total) by mouth once daily., Disp: 45 tablet, Rfl: 1    olmesartan (BENICAR) 40 MG tablet, Take 1 tablet (40 mg total) by mouth once daily., Disp: 90 tablet, Rfl: 1    potassium chloride SA (K-DUR,KLOR-CON M) 10 MEQ tablet, Take 1 tablet (10 mEq total) by mouth once daily., Disp: 30 tablet, Rfl: 5    Review of Systems   Constitutional:  Negative for activity change, appetite change, chills, fatigue and fever.   Respiratory:  Negative for cough, shortness of breath and wheezing.    Cardiovascular:  Positive for leg swelling. Negative for chest pain and palpitations.   Gastrointestinal:  Negative for abdominal pain, constipation, diarrhea, nausea and vomiting.   Genitourinary:  Negative for difficulty urinating, dysuria, frequency, hematuria and urgency.   Musculoskeletal:  Negative for arthralgias.   Skin:  Negative for rash.   Neurological:  Positive for weakness. Negative for dizziness and syncope.   Psychiatric/Behavioral:  Negative for behavioral problems.         Objective:      Vitals:    08/30/22 1005   BP: 128/60   Pulse: 64   Temp: 98.4 °F (36.9 °C)   SpO2: 96%   Height: 5' 7" (1.702 m)     Physical Exam  Vitals and nursing note reviewed.   Constitutional:       General: She is not in acute distress.     Appearance: Normal appearance. She is obese. She is not ill-appearing, toxic-appearing or diaphoretic.      Comments: Morbidly obese WF sitting erect in a WC in " no acute distress.   HENT:      Head: Normocephalic and atraumatic.      Right Ear: External ear normal.      Left Ear: External ear normal.      Nose: No rhinorrhea.      Mouth/Throat:      Mouth: Mucous membranes are moist.      Pharynx: Oropharynx is clear.   Eyes:      General: No scleral icterus.     Extraocular Movements: Extraocular movements intact.      Conjunctiva/sclera: Conjunctivae normal.   Cardiovascular:      Rate and Rhythm: Normal rate and regular rhythm.      Pulses:           Dorsalis pedis pulses are 1+ on the right side and 1+ on the left side.        Posterior tibial pulses are 1+ on the right side and 1+ on the left side.      Heart sounds: Normal heart sounds. No murmur heard.    No friction rub.      Comments: No JVD noted  Pulmonary:      Effort: Pulmonary effort is normal. No respiratory distress.      Breath sounds: Normal breath sounds. No wheezing, rhonchi or rales.      Comments: Patient breathing comfortably on room air, with no use of respiratory accessory muscles noted on inspection.  Adequate breath sounds appreciated on auscultation in the diffuse bilateral lung fields, with no signs of consolidation appreciated.  Abdominal:      Palpations: Abdomen is soft.      Tenderness: There is no abdominal tenderness. There is no guarding or rebound.   Musculoskeletal:         General: No tenderness.      Cervical back: Normal range of motion.      Right lower leg: Edema (2+) present.      Left lower leg: Edema (2+) present.      Comments: Serous blisters noted secondary to bilateral 2+ pitting edema.  No open wounds or cellulitic signs of infection appreciated.   Skin:     General: Skin is warm and dry.      Findings: No rash.   Neurological:      Mental Status: She is alert. Mental status is at baseline.      Motor: Weakness present.   Psychiatric:         Mood and Affect: Mood normal.         Behavior: Behavior is cooperative.         Assessment:       1. Type 2 diabetes mellitus  without complication, without long-term current use of insulin    2. Venous insufficiency of both lower extremities    3. Essential hypertension    4. Neuropathy    5. Dyslipidemia    6. PAF (paroxysmal atrial fibrillation)    7. Cerebral infarction due to thrombosis of posterior cerebral artery, unspecified blood vessel laterality    8. Morbid (severe) obesity due to excess calories    9. Weakness    10. Lymphedema           Plan:       Type 2 diabetes mellitus without complication, without long-term current use of insulin  Comments:  POCT A1c today:  5.9%.    Stable and well controlled.  Continue as is.  Refills sent today.  Lab work ordered and to be completed today.  Will complete foot exam on follow-up visit.  Orders:  -     Microalbumin/Creatinine Ratio, Urine; Future  -     Urinalysis, Reflex to Urine Culture Urine, Clean Catch; Future; Expected date: 08/30/2022  -     Hemoglobin A1C, POCT  -     metFORMIN (GLUCOPHAGE-XR) 500 MG ER 24hr tablet; Take 1 tablet (500 mg total) by mouth daily with breakfast.  Dispense: 90 tablet; Refill: 1  -     glipiZIDE (GLUCOTROL) 2.5 MG TR24; Take 1 tablet (2.5 mg total) by mouth daily with breakfast.  Dispense: 90 tablet; Refill: 1    Venous insufficiency of both lower extremities  Comments:  Begin wearing compression socks daily and elevate lower extremities when stationary.  Increase daily activity.   Orders:  -     potassium chloride SA (K-DUR,KLOR-CON M) 10 MEQ tablet; Take 1 tablet (10 mEq total) by mouth once daily.  Dispense: 30 tablet; Refill: 5    Essential hypertension  Comments:  BP well controlled.  Continue as is.  Refills sent today.  Orders:  -     CBC Auto Differential; Future; Expected date: 08/30/2022  -     TSH w/reflex to FT4; Future; Expected date: 08/30/2022  -     metoprolol succinate (TOPROL-XL) 25 MG 24 hr tablet; Take 0.5 tablets (12.5 mg total) by mouth once daily.  Dispense: 45 tablet; Refill: 1  -     olmesartan (BENICAR) 40 MG tablet; Take 1  tablet (40 mg total) by mouth once daily.  Dispense: 90 tablet; Refill: 1  -     amLODIPine (NORVASC) 5 MG tablet; Take 1 tablet (5 mg total) by mouth nightly.  Dispense: 90 tablet; Refill: 1    Neuropathy  Comments:  Stable with Gabapentin.  Continue as is.  Refill sent today.  Orders:  -     gabapentin (NEURONTIN) 100 MG capsule; Take 1 capsule (100 mg total) by mouth once daily.  Dispense: 90 capsule; Refill: 1    Dyslipidemia  Comments:  Will obtain updated lipid panel and CMP.  Continue Lipitor 40 mg q.h.s. as is.  Refill sent today.  Orders:  -     Comprehensive Metabolic Panel; Future; Expected date: 08/30/2022  -     Lipid Panel; Future; Expected date: 08/30/2022  -     atorvastatin (LIPITOR) 40 MG tablet; Take 1 tablet (40 mg total) by mouth nightly.  Dispense: 90 tablet; Refill: 1    PAF (paroxysmal atrial fibrillation)  Comments:  NSR today in office.  Continue current treatment regimen is.    Cerebral infarction due to thrombosis of posterior cerebral artery, unspecified blood vessel laterality    Morbid (severe) obesity due to excess calories    Weakness  Amb ref to Home Health ([facility]) sent today requesting PT/OT eval and treat for lower extremity weakness.  Patient is a high fall risk.  -     Cancel: Ambulatory referral/consult to Home Health; Future; Expected date: 08/30/2022  -     Ambulatory referral/consult to Home Health; Future; Expected date: 08/30/2022    Lymphedema  Increase Lasix to 40 mg q.d. x7 days.  Amb ref to Home Health ([facility]) sent today requesting  Unna boot therapy secondary to CHEMA.  Elevate lower extremities when stationary and increase daily exercise.  -     Ambulatory referral/consult to Home Health; Future; Expected date: 08/30/2022    Follow up in about 6 months (around 2/28/2023).        8/30/2022 Michael Arriaga PA-C

## 2022-08-31 NOTE — TELEPHONE ENCOUNTER
----- Message from Arsenio Brower MA sent at 8/31/2022  2:37 PM CDT -----  Pt daughter niya mcnair calling asking for a call back from juani regarding the pt. # [phone]

## 2022-09-01 NOTE — TELEPHONE ENCOUNTER
----- Message from Analia Sethi sent at 9/1/2022 10:44 AM CDT -----  - pt was in Tuesday and came home with some things from [employer]. She cannot find anyone to bring it back in for her. She does not know what to do.   [phone]

## 2022-09-01 NOTE — TELEPHONE ENCOUNTER
----- Message from Analia Sethi sent at 9/1/2022 10:46 AM CDT -----  Vm- pt son torsten will now drop off her stuff for [facility] at her office   [phone]

## 2022-09-02 NOTE — TELEPHONE ENCOUNTER
Pt calling to let us know that she is trying to get her urine specimen to us but is having transportation issues.

## 2022-09-02 NOTE — TELEPHONE ENCOUNTER
----- Message from MICHAEL Olson sent at 9/2/2022 12:48 PM CDT -----  Please contact patient and inform her that her lab work was reviewed.  Glucose was slightly elevated at 112.  Liver and thyroid function are within normal limits.  Anemia is stable and has improved compared to previous lab work.  Cholesterol levels are well controlled.  Kidney function is decreased, suggesting a dehydration possible from the increased lasix dose.  Secondary to this, I recommend decreasing Lasix to 20mg q.d. and repeating a BMP in 1 week. Continue with Unna Boot therapy via [facility], and follow a low Na+ diet to prevent worsening CHEMA.

## 2022-09-02 NOTE — TELEPHONE ENCOUNTER
Spoke to pt verbatim per Juani. Pt voiced  understanding. Pt states she states she has only been taking 20 mg daily of lasix. Informed juani. He states to take 20mg every other day. Pt voiced understanding  Reminder created.   Letter sent to  to get BMP.

## 2022-09-09 NOTE — TELEPHONE ENCOUNTER
Left voice mail in regards to message sent. Verbalized per Michael that a referral sent to [facility] requesting wound care to sacral wound.  Begin apply Mupirocin 2% ointment to the site t.i.d.. Keep area covered with a nonadherent dressing and change dressing t.i.d. Prevent prolonged pressure on the area.  Prescription sent to pharmacy listed.

## 2022-09-09 NOTE — TELEPHONE ENCOUNTER
----- Message from Aliya Mcgee MA sent at 9/9/2022  8:53 AM CDT -----    ----- Message -----  From: Stephanie Frank  Sent: 9/9/2022   8:44 AM CDT  To: Misael Jasmine [facility]

## 2022-09-09 NOTE — TELEPHONE ENCOUNTER
Referral sent to [facility] requesting wound care to sacral wound.  Begin apply Mupirocin 2% ointment to the site t.i.d.. Keep area covered with a nonadherent dressing and change dressing t.i.d. Prevent prolonged pressure on the area.  Prescription sent to pharmacy listed.
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
## 2022-12-09 NOTE — TELEPHONE ENCOUNTER
----- Message from Arsenio Brower MA sent at 12/9/2022  9:55 AM CST -----  Regarding: uti  Pt son Drew calling to find out if he can bring a urine specimen in for the pt to be tested because they believe she has uti.

## 2022-12-10 NOTE — ED NOTES
Pt family left room with intent to come back later.  Update Family member (Isidro) [phone] with any changes

## 2022-12-13 PROBLEM — I25.10 CORONARY ARTERY DISEASE INVOLVING NATIVE CORONARY ARTERY OF NATIVE HEART WITHOUT ANGINA PECTORIS: Status: ACTIVE | Noted: 2021-09-05

## 2022-12-13 PROBLEM — G93.40 ACUTE ENCEPHALOPATHY: Status: ACTIVE | Noted: 2022-01-01

## 2022-12-13 PROBLEM — E66.01 MORBID (SEVERE) OBESITY DUE TO EXCESS CALORIES: Status: RESOLVED | Noted: 2021-02-25 | Resolved: 2022-01-01

## 2022-12-13 PROBLEM — R57.9 SHOCK, UNSPECIFIED: Status: ACTIVE | Noted: 2022-01-01

## 2022-12-13 PROBLEM — R00.1 BRADYCARDIA: Status: ACTIVE | Noted: 2022-01-01

## 2022-12-13 PROBLEM — Z66 DNR (DO NOT RESUSCITATE): Status: ACTIVE | Noted: 2022-01-01

## 2022-12-13 PROBLEM — I48.19 PERSISTENT ATRIAL FIBRILLATION: Status: ACTIVE | Noted: 2020-11-06

## 2022-12-13 PROBLEM — T68.XXXA HYPOTHERMIA: Status: ACTIVE | Noted: 2022-01-01

## 2022-12-13 NOTE — HOSPITAL COURSE
Ms Tierra Jennings was admitted with shock. Suspect this may be in part due to medication changes. No signs of infection. Troponin negative. TTE normal EF. ACTH stim test normal. Dr Rizvi discussed code status and ICU care with family- DNR, no vasopressors, no central lines. She improved with IVF alone. However, she is still altered. This is newer onset with confusion and hallucinations over the last week which was the cause of her admission to [facility]. Neurology and Psychiatry consulted. CEC rescinded. Hypothermia has resolved. BP is improving. HR is improved but remains in Afib. PT, OT, Speech consulted. Stepped down to floor. On the floor she was stable labs was ok ,DC to SNF

## 2022-12-13 NOTE — SUBJECTIVE & OBJECTIVE
Past Medical History:   Diagnosis Date    Diabetes mellitus, type 2     Heart murmur     Hypertension     Paroxysmal atrial fibrillation     Stroke        Past Surgical History:   Procedure Laterality Date    EYE SURGERY      TONSILLECTOMY      VEIN LIGATION AND STRIPPING         Review of patient's allergies indicates:   Allergen Reactions    Aspirin Other (See Comments)     Bleeding    Codeine     Erythromycin Rash       No current facility-administered medications on file prior to encounter.     Current Outpatient Medications on File Prior to Encounter   Medication Sig    blood sugar diagnostic (ONETOUCH ULTRA TEST) Strp 1 strip by miscellaneous route 2 times daily. DX code 250.00 (Patient taking differently: 1 strip by Misc.(Non-Drug; Combo Route) route 2 (two) times daily. 1 strip by miscellaneous route 2 times daily. DX code 250.00)    blood sugar diagnostic Strp To check BG 2 times daily, to use with insurance preferred meter    clotrimazole (LOTRIMIN) 1 % cream Apply 1 ampule topically 3 (three) times daily.    flecainide (TAMBOCOR) 50 MG Tab Take 1 tablet (50 mg total) by mouth every 12 (twelve) hours.    furosemide (LASIX) 40 MG tablet Take 40 mg by mouth once daily.    gabapentin (NEURONTIN) 100 MG capsule Take 1 capsule (100 mg total) by mouth once daily.    glipiZIDE (GLUCOTROL) 2.5 MG TR24 Take 1 tablet (2.5 mg total) by mouth daily with breakfast.    losartan (COZAAR) 100 MG tablet Take 100 mg by mouth once daily.    metFORMIN (GLUCOPHAGE-XR) 500 MG ER 24hr tablet Take 1 tablet (500 mg total) by mouth daily with breakfast.    metoprolol succinate (TOPROL-XL) 25 MG 24 hr tablet Take 0.5 tablets (12.5 mg total) by mouth once daily. (Patient taking differently: Take 25 mg by mouth once daily.)    mupirocin (BACTROBAN) 2 % ointment Apply topically 3 (three) times daily.    potassium chloride SA (K-DUR,KLOR-CON M) 10 MEQ tablet Take 1 tablet (10 mEq total) by mouth once daily.    risperiDONE (RISPERDAL)  0.5 MG Tab Take 0.5 mg by mouth every evening.    zinc oxide 16 % Oint ointment Apply 1 ampule topically once daily.    amLODIPine (NORVASC) 5 MG tablet Take 1 tablet (5 mg total) by mouth nightly. (Patient not taking: Reported on 12/13/2022)    hydrocortisone 2.5 % cream Apply topically 2 (two) times daily. (Patient not taking: Reported on 12/13/2022)    pantoprazole (PROTONIX) 40 MG tablet Take 1 tablet (40 mg total) by mouth once daily. (Patient not taking: Reported on 12/13/2022)    [DISCONTINUED] apixaban (ELIQUIS) 5 mg Tab Take 1 tablet (5 mg total) by mouth 2 (two) times daily.    [DISCONTINUED] atorvastatin (LIPITOR) 40 MG tablet Take 1 tablet (40 mg total) by mouth nightly.    [DISCONTINUED] furosemide (LASIX) 40 MG tablet Take 0.5 tablets (20 mg total) by mouth once daily. (Patient taking differently: Take 40 mg by mouth once daily.)    [DISCONTINUED] hydrocortisone (ANUSOL-HC) 2.5 % rectal cream Place rectally 2 (two) times daily.    [DISCONTINUED] olmesartan (BENICAR) 40 MG tablet Take 1 tablet (40 mg total) by mouth once daily.     Family History       Problem Relation (Age of Onset)    Heart disease Mother          Tobacco Use    Smoking status: Never    Smokeless tobacco: Never   Substance and Sexual Activity    Alcohol use: No    Drug use: No    Sexual activity: Not Currently     Review of Systems   Unable to perform ROS: Dementia   Objective:     Vital Signs (Most Recent):  Temp: (!) 93.6 °F (34.2 °C) (12/13/22 0815)  Pulse: 70 (12/13/22 0930)  Resp: (!) 6 (12/13/22 0930)  BP: (!) 83/44 (12/13/22 0930)  SpO2: 99 % (12/13/22 0930)   Vital Signs (24h Range):  Temp:  [89 °F (31.7 °C)-98 °F (36.7 °C)] 93.6 °F (34.2 °C)  Pulse:  [42-75] 70  Resp:  [6-35] 6  SpO2:  [95 %-100 %] 99 %  BP: ()/(35-84) 83/44     Weight: 86.2 kg (190 lb)  Body mass index is 29.76 kg/m².    SpO2: 99 %         Intake/Output Summary (Last 24 hours) at 12/13/2022 1008  Last data filed at 12/13/2022 0900  Gross per 24 hour    Intake 2354.88 ml   Output 150 ml   Net 2204.88 ml       Lines/Drains/Airways       Drain  Duration                  Urethral Catheter 12/13/22 16 Fr. <1 day              Peripheral Intravenous Line  Duration                  Peripheral IV - Single Lumen 12/13/22 0226 20 G Distal;Left;Posterior Forearm <1 day         Peripheral IV - Single Lumen 12/13/22 0312 18 G Anterior;Right Forearm <1 day                    Physical Exam  HENT:      Head: Atraumatic.   Eyes:      Conjunctiva/sclera: Conjunctivae normal.   Cardiovascular:      Rate and Rhythm: Rhythm irregular.      Heart sounds: Murmur heard.   Pulmonary:      Effort: No respiratory distress.   Abdominal:      Palpations: Abdomen is soft.   Skin:     General: Skin is warm.   Neurological:      Mental Status: She is disoriented.       Significant Labs: BMP:   Recent Labs   Lab 12/13/22 0121 12/13/22  0545   * 295*    138   K 4.4 3.8    114*   CO2 20* 17*   BUN 33* 33*   CREATININE 1.7* 1.6*   CALCIUM 8.8 7.2*   MG 1.8 1.4*   , CMP   Recent Labs   Lab 12/13/22  0121 12/13/22  0545    138   K 4.4 3.8    114*   CO2 20* 17*   * 295*   BUN 33* 33*   CREATININE 1.7* 1.6*   CALCIUM 8.8 7.2*   PROT 7.2 5.1*   ALBUMIN 3.1* 2.2*   BILITOT 0.3 0.3   ALKPHOS 120 80   AST 22 17   ALT 15 12   ANIONGAP 13 7*   , CBC   Recent Labs   Lab 12/13/22 0121 12/13/22  0545   WBC 12.33 10.92   HGB 12.2 9.6*   HCT 36.8* 29.4*    228   , INR No results for input(s): INR, PROTIME in the last 48 hours., Lipid Panel No results for input(s): CHOL, HDL, LDLCALC, TRIG, CHOLHDL in the last 48 hours., Troponin   Recent Labs   Lab 12/13/22  0121 12/13/22  0545   TROPONINI 0.017 <0.006   , and All pertinent lab results from the last 24 hours have been reviewed.    Significant Imaging: Echocardiogram: Transthoracic echo (TTE) complete (Cupid Only): No results found for this or any previous visit.

## 2022-12-13 NOTE — SUBJECTIVE & OBJECTIVE
Past Medical History:   Diagnosis Date    Diabetes mellitus, type 2     Heart murmur     Hypertension     Paroxysmal atrial fibrillation     Stroke        Past Surgical History:   Procedure Laterality Date    EYE SURGERY      TONSILLECTOMY      VEIN LIGATION AND STRIPPING         Review of patient's allergies indicates:   Allergen Reactions    Aspirin Other (See Comments)     Bleeding    Codeine     Erythromycin Rash       No current facility-administered medications on file prior to encounter.     Current Outpatient Medications on File Prior to Encounter   Medication Sig    amLODIPine (NORVASC) 5 MG tablet Take 1 tablet (5 mg total) by mouth nightly.    blood sugar diagnostic (ONETOUCH ULTRA TEST) Strp 1 strip by miscellaneous route 2 times daily. DX code 250.00 (Patient taking differently: 1 strip by Misc.(Non-Drug; Combo Route) route 2 (two) times daily. 1 strip by miscellaneous route 2 times daily. DX code 250.00)    blood sugar diagnostic Strp To check BG 2 times daily, to use with insurance preferred meter    clotrimazole (LOTRIMIN) 1 % cream Apply 1 ampule topically 3 (three) times daily.    flecainide (TAMBOCOR) 50 MG Tab Take 1 tablet (50 mg total) by mouth every 12 (twelve) hours.    furosemide (LASIX) 40 MG tablet Take 40 mg by mouth once daily.    gabapentin (NEURONTIN) 100 MG capsule Take 1 capsule (100 mg total) by mouth once daily.    glipiZIDE (GLUCOTROL) 2.5 MG TR24 Take 1 tablet (2.5 mg total) by mouth daily with breakfast. (Patient taking differently: Take 5 mg by mouth daily with breakfast.)    hydrocortisone 2.5 % cream Apply topically 2 (two) times daily.    losartan (COZAAR) 100 MG tablet Take 100 mg by mouth once daily.    metFORMIN (GLUCOPHAGE-XR) 500 MG ER 24hr tablet Take 1 tablet (500 mg total) by mouth daily with breakfast.    metoprolol succinate (TOPROL-XL) 25 MG 24 hr tablet Take 0.5 tablets (12.5 mg total) by mouth once daily. (Patient taking differently: Take 25 mg by mouth once  daily.)    mupirocin (BACTROBAN) 2 % ointment Apply topically 3 (three) times daily.    pantoprazole (PROTONIX) 40 MG tablet Take 1 tablet (40 mg total) by mouth once daily.    potassium chloride SA (K-DUR,KLOR-CON M) 10 MEQ tablet Take 1 tablet (10 mEq total) by mouth once daily.    risperiDONE (RISPERDAL) 0.5 MG Tab Take 0.5 mg by mouth every evening.    zinc oxide 16 % Oint ointment Apply 1 ampule topically once daily.    [DISCONTINUED] apixaban (ELIQUIS) 5 mg Tab Take 1 tablet (5 mg total) by mouth 2 (two) times daily.    [DISCONTINUED] atorvastatin (LIPITOR) 40 MG tablet Take 1 tablet (40 mg total) by mouth nightly.    [DISCONTINUED] furosemide (LASIX) 40 MG tablet Take 0.5 tablets (20 mg total) by mouth once daily. (Patient taking differently: Take 40 mg by mouth once daily.)    [DISCONTINUED] hydrocortisone (ANUSOL-HC) 2.5 % rectal cream Place rectally 2 (two) times daily.    [DISCONTINUED] olmesartan (BENICAR) 40 MG tablet Take 1 tablet (40 mg total) by mouth once daily.     Family History       Problem Relation (Age of Onset)    Heart disease Mother          Tobacco Use    Smoking status: Never    Smokeless tobacco: Never   Substance and Sexual Activity    Alcohol use: No    Drug use: No    Sexual activity: Not Currently     Review of Systems   Unable to perform ROS: Dementia   Objective:     Vital Signs (Most Recent):  Temp: (!) 90.3 °F (32.4 °C) (12/13/22 0235)  Pulse: (!) 49 (12/13/22 0432)  Resp: (!) 27 (12/13/22 0432)  BP: (!) 67/43 (12/13/22 0432)  SpO2: 97 % (12/13/22 0432)   Vital Signs (24h Range):  Temp:  [89 °F (31.7 °C)-98 °F (36.7 °C)] 90.3 °F (32.4 °C)  Pulse:  [48-75] 49  Resp:  [12-35] 27  SpO2:  [95 %-100 %] 97 %  BP: ()/(35-62) 67/43     Weight: 86.2 kg (190 lb)  Body mass index is 29.76 kg/m².    Physical Exam  Constitutional:       General: She is not in acute distress.     Appearance: She is obese. She is ill-appearing. She is not toxic-appearing or diaphoretic.   HENT:      Head:  "Normocephalic and atraumatic.   Eyes:      General: Lids are normal.      Conjunctiva/sclera:      Right eye: Right conjunctiva is not injected.      Left eye: Left conjunctiva is not injected.      Comments: Pupils 2mm and sluggish   Neck:      Thyroid: No thyromegaly.   Cardiovascular:      Rate and Rhythm: Bradycardia present. Rhythm irregularly irregular.      Heart sounds: Murmur heard.   Systolic murmur is present with a grade of 1/6.   Pulmonary:      Effort: Bradypnea present.      Breath sounds: Examination of the right-lower field reveals rales. Examination of the left-lower field reveals rales. Decreased breath sounds and rales present. No wheezing or rhonchi.   Chest:      Chest wall: No deformity or swelling.   Abdominal:      General: Abdomen is protuberant. Bowel sounds are decreased. There is no distension.      Palpations: Abdomen is soft.   Genitourinary:     Comments: No leslie in place (now ordered)  Musculoskeletal:      Cervical back: Neck supple.   Lymphadenopathy:      Comments: 1+ edema to skin   Skin:     General: Skin is cool and dry.   Neurological:      Mental Status: She is lethargic and disoriented.      GCS: GCS eye subscore is 3. GCS verbal subscore is 4. GCS motor subscore is 6.      Comments: After quite a time of sternal rubbing her, I was able to get her perform "finger squeeze" "press on the gas pedal" to all extremities.  Her strength seems adequate for her volition, and equal in all extremitites.     Psychiatric:         Attention and Perception: She is inattentive.         Speech: She is noncommunicative. Speech is slurred.         Behavior: Behavior is slowed and withdrawn.         Cognition and Memory: Cognition is impaired. Memory is impaired. She exhibits impaired recent memory and impaired remote memory.           Significant Labs: All pertinent labs within the past 24 hours have been reviewed.  Recent Results (from the past 24 hour(s))   CBC auto differential    " Collection Time: 12/13/22  1:21 AM   Result Value Ref Range    WBC 12.33 3.90 - 12.70 K/uL    RBC 4.07 4.00 - 5.40 M/uL    Hemoglobin 12.2 12.0 - 16.0 g/dL    Hematocrit 36.8 (L) 37.0 - 48.5 %    MCV 90 82 - 98 fL    MCH 30.0 27.0 - 31.0 pg    MCHC 33.2 32.0 - 36.0 g/dL    RDW 14.7 (H) 11.5 - 14.5 %    Platelets 322 150 - 450 K/uL    MPV 10.4 9.2 - 12.9 fL    Immature Granulocytes 0.3 0.0 - 0.5 %    Gran # (ANC) 10.2 (H) 1.8 - 7.7 K/uL    Immature Grans (Abs) 0.04 0.00 - 0.04 K/uL    Lymph # 1.2 1.0 - 4.8 K/uL    Mono # 0.8 0.3 - 1.0 K/uL    Eos # 0.1 0.0 - 0.5 K/uL    Baso # 0.02 0.00 - 0.20 K/uL    nRBC 0 0 /100 WBC    Gran % 82.9 (H) 38.0 - 73.0 %    Lymph % 9.9 (L) 18.0 - 48.0 %    Mono % 6.3 4.0 - 15.0 %    Eosinophil % 0.4 0.0 - 8.0 %    Basophil % 0.2 0.0 - 1.9 %    Differential Method Automated    Comprehensive metabolic panel    Collection Time: 12/13/22  1:21 AM   Result Value Ref Range    Sodium 142 136 - 145 mmol/L    Potassium 4.4 3.5 - 5.1 mmol/L    Chloride 109 95 - 110 mmol/L    CO2 20 (L) 23 - 29 mmol/L    Glucose 129 (H) 70 - 110 mg/dL    BUN 33 (H) 8 - 23 mg/dL    Creatinine 1.7 (H) 0.5 - 1.4 mg/dL    Calcium 8.8 8.7 - 10.5 mg/dL    Total Protein 7.2 6.0 - 8.4 g/dL    Albumin 3.1 (L) 3.5 - 5.2 g/dL    Total Bilirubin 0.3 0.1 - 1.0 mg/dL    Alkaline Phosphatase 120 55 - 135 U/L    AST 22 10 - 40 U/L    ALT 15 10 - 44 U/L    Anion Gap 13 8 - 16 mmol/L    eGFR 28 (A) >60 mL/min/1.73 m^2   Lactic acid, plasma #1    Collection Time: 12/13/22  1:21 AM   Result Value Ref Range    Lactate (Lactic Acid) 1.7 0.5 - 2.2 mmol/L   Magnesium    Collection Time: 12/13/22  1:21 AM   Result Value Ref Range    Magnesium 1.8 1.6 - 2.6 mg/dL   Phosphorus    Collection Time: 12/13/22  1:21 AM   Result Value Ref Range    Phosphorus 4.6 (H) 2.7 - 4.5 mg/dL   Troponin I    Collection Time: 12/13/22  1:21 AM   Result Value Ref Range    Troponin I 0.017 0.000 - 0.026 ng/mL   Procalcitonin    Collection Time: 12/13/22  1:21  AM   Result Value Ref Range    Procalcitonin 0.11 <0.25 ng/mL   Brain natriuretic peptide    Collection Time: 12/13/22  1:21 AM   Result Value Ref Range     (H) 0 - 99 pg/mL   Urinalysis, Reflex to Urine Culture Urine, Clean Catch    Collection Time: 12/13/22  1:45 AM    Specimen: Urine   Result Value Ref Range    Specimen UA Urine, Catheterized     Color, UA Yellow Yellow, Straw, Yuik    Appearance, UA Clear Clear    pH, UA 5.0 5.0 - 8.0    Specific Gravity, UA 1.010 1.005 - 1.030    Protein, UA Negative Negative    Glucose, UA Negative Negative    Ketones, UA Negative Negative    Bilirubin (UA) Negative Negative    Occult Blood UA Negative Negative    Nitrite, UA Negative Negative    Urobilinogen, UA Negative <2.0 EU/dL    Leukocytes, UA Negative Negative   TSH    Collection Time: 12/13/22  1:45 AM   Result Value Ref Range    TSH 7.608 (H) 0.400 - 4.000 uIU/mL   T4, Free    Collection Time: 12/13/22  1:45 AM   Result Value Ref Range    Free T4 1.22 0.71 - 1.51 ng/dL   ISTAT Lactate    Collection Time: 12/13/22  2:04 AM   Result Value Ref Range    POC Lactate 1.10 0.5 - 2.2 mmol/L    Sample VENOUS     Site Other     Allens Test N/A    ISTAT PROCEDURE    Collection Time: 12/13/22  3:12 AM   Result Value Ref Range    POC PH 7.246 (L) 7.35 - 7.45    POC PCO2 48.9 (H) 35 - 45 mmHg    POC PO2 49 40 - 60 mmHg    POC HCO3 21.3 (L) 24 - 28 mmol/L    POC BE -6 -2 to 2 mmol/L    POC SATURATED O2 77 (L) 95 - 100 %    POC TCO2 23 (L) 24 - 29 mmol/L    Sample VENOUS     Site Other     Allens Test N/A    POCT glucose    Collection Time: 12/13/22  4:35 AM   Result Value Ref Range    POCT Glucose 282 (H) 70 - 110 mg/dL         Significant Imaging: I have reviewed all pertinent imaging results/findings within the past 24 hours.

## 2022-12-13 NOTE — PROGRESS NOTES
"[facility]  [facility] Medicine  Progress Note    Patient Name: Tierra Jennings  MRN: [id number]  Patient Class: IP- Inpatient   Admission Date: 12/13/2022  Length of Stay: 0 days  Attending Physician: Bernarda Dennison MD  Primary Care Provider: Misael Jasmine MD        Subjective:     Principal Problem:Shock, unspecified        HPI:  Ms. Jennings is an 89yo lady with a past medical history of DM2, HTN, CVA, dementia, CAD with NSTEMI, and PAfib.  She has no TTE on file here or in Care Everywhere.    She was recently admitted to [facility] on 12/9/22 after being taken to [facility] by her family due to hallucinations and delusions of, "being kidnapped."  She had a PEC placed that same day.  Her son states she was hallucinating people who were not there, such as teenagers running around her house.      I am not sure what occurred at [facility] today, but they did send a clinical sheet over stating, "BP 79/50, RR 14."  From her med list, it appears she had been started on only Risperdal 0.5mg po QHS.  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  She has continued on her Toprol XL and her Flecainide.          Overview/Hospital Course:  Ms Tierra Jennings was admitted with shock. Suspect this may be in part due to medication changes. No signs of infection thus far. Troponin negative. ACTH stim test normal. Dr Rizvi discussed code status and ICU care with family- DNR, no vasopressors, no central lines. She remains on IVF and IV antibiotics while awaiting full culture data. TTE is also pending. She also has new onset altered mental status with confusion and hallucinations over the last " week which was the cause of her admission to [facility]. Neurology consulted.       Interval History: She awakens easily this morning. She says she has pain on her seat. She is oriented to self but not location, situation, or date.     Review of Systems   Respiratory:  Negative for shortness of breath.    Cardiovascular:  Negative for chest pain.   Gastrointestinal:  Negative for abdominal pain.   Psychiatric/Behavioral:  Positive for confusion.    Objective:     Vital Signs (Most Recent):  Temp: (!) 93.6 °F (34.2 °C) (12/13/22 0815)  Pulse: (!) 59 (12/13/22 1100)  Resp: 14 (12/13/22 1100)  BP: (!) 82/48 (12/13/22 1100)  SpO2: 97 % (12/13/22 1100)   Vital Signs (24h Range):  Temp:  [89 °F (31.7 °C)-98 °F (36.7 °C)] 93.6 °F (34.2 °C)  Pulse:  [42-75] 59  Resp:  [6-35] 14  SpO2:  [95 %-100 %] 97 %  BP: ()/(28-84) 82/48     Weight: 86.2 kg (190 lb)  Body mass index is 29.76 kg/m².    Intake/Output Summary (Last 24 hours) at 12/13/2022 1116  Last data filed at 12/13/2022 1100  Gross per 24 hour   Intake 2625.9 ml   Output 150 ml   Net 2475.9 ml      Physical Exam  Vitals and nursing note reviewed.   Constitutional:       General: She is not in acute distress.     Appearance: She is not ill-appearing or toxic-appearing.   HENT:      Head: Normocephalic and atraumatic.      Nose: Nose normal.      Mouth/Throat:      Mouth: Mucous membranes are moist.   Cardiovascular:      Rate and Rhythm: Bradycardia present. Rhythm irregular.      Pulses: Normal pulses.      Heart sounds: Normal heart sounds. No murmur heard.    No gallop.      Comments: Afib on monitor  Pulmonary:      Effort: Pulmonary effort is normal. No respiratory distress.      Breath sounds: Normal breath sounds. No wheezing or rales.      Comments: Room air  Abdominal:      General: Bowel sounds are normal. There is no distension.      Palpations: Abdomen is soft.      Tenderness: There is no abdominal tenderness. There is no guarding.   Genitourinary:      "Comments: Causey in place  Musculoskeletal:      Right lower leg: No edema.      Left lower leg: No edema.   Skin:     General: Skin is warm and dry.   Neurological:      Mental Status: She is alert.      Comments: Oriented to self. Follows commands to squeeze with both hands       Significant Labs: All pertinent labs within the past 24 hours have been reviewed.    Significant Imaging: I have reviewed all pertinent imaging results/findings within the past 24 hours.      Assessment/Plan:      * Shock, unspecified  I suspect some of this is due to medications. No evidence of sepsis currently. Does not appear volume overloaded. ACTH stim test negative.   - follow up TTE  - follow up cultures  - for now, will continue IVF and antibiotics      Acute encephalopathy  She has baseline dementia, but seems pretty high functioning per sons at normal baseline. Family reporting that she was living in dependently until 1 week ago when she developed worsening confusion and hallucinations, which prompted admission to [facility].   - currently oriented to self only  - suspect some of this is medication induced  - would like MRI brain when stable to do it  - Neurology consulted    DNR (do not resuscitate)  See ACP note at admission.      Bradycardia  Slow Afib on telemetry  Tried Glucogon 5mg iv given Toprol use - no effect  Suspect this is effect of BB + Flecainide  Family does not want Atropine, Dobutamine or Levophed ("code drugs")  - monitor       Hypothermia  Her TSH is mildly elevated though FT4 is normal   - will try Synthroid 25 mcg iv qday  Warming blanket in place  Phenothiazine/atypical antipsychotics can induce poikilothermia, but low dose Risperdal 0.5mg HS seems unlikely        KIRSTIN (acute kidney injury)  Patient with acute kidney injury likely due to IVVD/dehydration KIRSTIN is currently worsening. Labs reviewed- Renal function/electrolytes with Estimated Creatinine Clearance: 26.9 mL/min (A) (based on SCr of 1.6 mg/dL (H)). " according to latest data. Monitor urine output and serial BMP and adjust therapy as needed. Avoid nephrotoxins and renally dose meds for GFR listed above.   - suspect this is due to shock and medication changes  - will review home med list with family  - TTE pending  - continue IVF  - BMP in AM    Coronary artery disease involving native coronary artery of native heart without angina pectoris  Trop negative x2  Doubt ACS  TTE pending       History of CVA (cerebrovascular accident)  CTH no acute changes; however, she does have newer mental status changes with hallucinations.     Persistent atrial fibrillation  Patient with Long standing persistent (>12 months) atrial fibrillation which is controlled currently with Beta Blocker and Flecainide. Patient is currently in atrial fibrillation.[id number] Score: 5.  Anticoagulation indicated. Anticoagulation done with Eliquis.    She is bradycardic- hold BB and flecainide  Resume home eliquis        Type 2 diabetes mellitus without complication, without long-term current use of insulin  Patient's FSGs are controlled on current medication regimen.  Last A1c reviewed-   Lab Results   Component Value Date    HGBA1C 5.9 08/30/2022     Most recent fingerstick glucose reviewed-   Recent Labs   Lab 12/13/22  0435 12/13/22  0836   POCTGLUCOSE 282* 176*     Current correctional scale  Low  Maintain anti-hyperglycemic dose as follows-   Antihyperglycemics (From admission, onward)    Start     Stop Route Frequency Ordered    12/13/22 0525  insulin aspart U-100 pen 0-5 Units         -- SubQ Every 6 hours PRN 12/13/22 0425        Will review home medication list with patient's family today      VTE Risk Mitigation (From admission, onward)         Ordered     apixaban tablet 5 mg  2 times daily         12/13/22 1136     IP VTE HIGH RISK PATIENT  Once         12/13/22 0431     Place sequential compression device  Until discontinued         12/13/22 0431     Place LEONCIO hose  Until  discontinued         12/13/22 0431                Discharge Planning   JORGE:      Code Status: DNR   Is the patient medically ready for discharge?:     Reason for patient still in hospital (select all that apply): Patient trending condition               Critical care time spent on the evaluation and treatment of severe organ dysfunction, review of pertinent labs and imaging studies, discussions with consulting providers and discussions with patient/family: 30 minutes.      Bernarda Dennison MD  Department of Hospital Medicine   [facility] - Intensive Care

## 2022-12-13 NOTE — ED PROVIDER NOTES
Encounter Date: 12/13/2022       History     Chief Complaint   Patient presents with    Hypotension     Pt arrived via ems, pt chief complaint was Hypotension.  Pt was sent from [facility] for hypotension and AMS. Pt is usually a/ox1 and seems to be at baseline mental status. Pt bp with ems was 70/50's currently 110/56       89-year-old female with a history of dementia, baseline oriented x1 presenting with altered mental status, hypotension.  Patient was sent from [facility] due to concern for decreased mental status as well as hypotension.  Blood pressure noticed to be in the 70s.  Patient was recently sent there due to worsening hallucinations which was new for her.  Patient unable to answer significant questions though can follow commands.  History of stroke.  History otherwise significantly limited due to patient dementia.    Review of patient's allergies indicates:   Allergen Reactions    Aspirin Other (See Comments)     Bleeding    Codeine     Erythromycin Rash     Past Medical History:   Diagnosis Date    Diabetes mellitus, type 2     Heart murmur     Hypertension     Stroke      Past Surgical History:   Procedure Laterality Date    EYE SURGERY      TONSILLECTOMY      VEIN LIGATION AND STRIPPING       Family History   Problem Relation Age of Onset    Heart disease Mother      Social History     Tobacco Use    Smoking status: Never    Smokeless tobacco: Never   Substance Use Topics    Alcohol use: No    Drug use: No     Review of Systems   Unable to perform ROS: Dementia     Physical Exam     Initial Vitals [12/13/22 0049]   BP Pulse Resp Temp SpO2   (!) 110/56 75 12 98 °F (36.7 °C) 98 %      MAP       --         Physical Exam    Nursing note and vitals reviewed.  Constitutional: She appears well-developed and well-nourished. She is not diaphoretic. No distress.   Morbid obesity.  Skin very cool to touch   HENT:   Head: Normocephalic and atraumatic.   Nose: Nose normal.   Very dry mucous  membranes   Eyes: EOM are normal. Pupils are equal, round, and reactive to light. No scleral icterus.   Neck: Neck supple.   Normal range of motion.  Cardiovascular:  Normal rate, regular rhythm, normal heart sounds and intact distal pulses.     Exam reveals no gallop and no friction rub.       No murmur heard.  Pulmonary/Chest: Breath sounds normal. No stridor. No respiratory distress. She has no wheezes. She has no rhonchi. She has no rales.   Abdominal: Abdomen is soft. Bowel sounds are normal. She exhibits no distension. There is no abdominal tenderness. There is no rebound and no guarding.   Musculoskeletal:         General: No tenderness or edema. Normal range of motion.      Cervical back: Normal range of motion and neck supple.     Neurological: She is oriented to person, place, and time. She has normal strength. No cranial nerve deficit.   Follows commands, moving all extremities   Skin: Skin is warm and dry. No rash noted.   Psychiatric: She has a normal mood and affect. Her behavior is normal.       ED Course   Procedures  Labs Reviewed   CBC W/ AUTO DIFFERENTIAL - Abnormal; Notable for the following components:       Result Value    Hematocrit 36.8 (*)     RDW 14.7 (*)     Gran # (ANC) 10.2 (*)     Gran % 82.9 (*)     Lymph % 9.9 (*)     All other components within normal limits   COMPREHENSIVE METABOLIC PANEL - Abnormal; Notable for the following components:    CO2 20 (*)     Glucose 129 (*)     BUN 33 (*)     Creatinine 1.7 (*)     Albumin 3.1 (*)     eGFR 28 (*)     All other components within normal limits   PHOSPHORUS - Abnormal; Notable for the following components:    Phosphorus 4.6 (*)     All other components within normal limits   B-TYPE NATRIURETIC PEPTIDE - Abnormal; Notable for the following components:     (*)     All other components within normal limits   CULTURE, BLOOD   CULTURE, BLOOD   LACTIC ACID, PLASMA   URINALYSIS, REFLEX TO URINE CULTURE    Narrative:     Specimen  Source->Urine   MAGNESIUM   TROPONIN I   PROCALCITONIN   LACTIC ACID, PLASMA   TSH   ISTAT LACTATE          Imaging Results              X-Ray Chest AP Portable (Final result)  Result time 12/13/22 01:47:53      Final result by Spencer Bedoya MD (12/13/22 01:47:53)                   Impression:      No significant detrimental change compared to prior study of 12/09/2022.      Electronically signed by: Spencer Bedoya MD  Date:    12/13/2022  Time:    01:47               Narrative:    EXAMINATION:  XR CHEST AP PORTABLE    CLINICAL HISTORY:  Sepsis;    TECHNIQUE:  Single frontal view of the chest was performed.    COMPARISON:  12/09/2022    FINDINGS:  Cardiac monitoring leads overlie the chest.  Cardiac silhouette is not significantly enlarged.  There is atherosclerotic calcification of the thoracic aorta.  There is chronic elevation of the left hemidiaphragm.  Lungs demonstrate chronic coarse interstitial attenuation.  There are linear subsegmental opacities at the lung bases which may relate to atelectasis or scarring.  There is blunting of the left costophrenic angle which may relate to small volume of pleural fluid or pleural thickening.  No significant volume of right-sided pleural fluid appreciated.  No evidence of pneumothorax.  Osseous structures demonstrate degenerative changes.  Prominent vascular calcifications project over the upper abdomen.                                       Medications   vancomycin - pharmacy to dose (has no administration in time range)   piperacillin-tazobactam 4.5 g in dextrose 5 % 100 mL IVPB (ready to mix system) (has no administration in time range)   vancomycin (VANCOCIN) 1,750 mg in dextrose 5 % 500 mL IVPB (has no administration in time range)   sodium chloride 0.9% bolus 1,000 mL (has no administration in time range)     Medical Decision Making:   Initial Assessment:   Patient initially had a blood pressure in the 1 teens systolic.  Patient appears very dry, severely dry  mucous membranes.  Blood pressure initially fluid responsive.  Patient is noted to be in AFib with slow ventricular rate, 50s.  Blood pressure returned to mid 70s.  Patient also noted to be significantly hypothermic, 89° rectal.  Luis Armando Hugger applied.  Exact cause unclear.  No leukocytosis.  Procalcitonin negative.  No obvious source of infection.  Given concern for possible sepsis, broad-spectrum antibiotics ordered.  Patient noted with history of basilar stroke.  Stroke felt less likely but CT head ordered.  Patient will be admitted to ICU for hypothermia, hypotension, altered mental status.  Differential is broad and includes medication overdose, infection, metabolic abnormality.                        Clinical Impression:   Final diagnoses:  [I95.9] Hypotension               Ihsan Pope MD  12/13/22 0210

## 2022-12-13 NOTE — H&P
"[facility]  [facility]  History & Physical    Patient Name: Tierra Jennings  MRN: [id number]  Patient Class: IP- Inpatient  Admission Date: 12/13/2022  Attending Physician: Bernarda Dennison MD   Primary Care Provider: Misael Jasmine MD         Patient information was obtained from relative(s), nursing home, past medical records and ER records.     Subjective:     Principal Problem:Shock, unspecified    Chief Complaint:   Chief Complaint   Patient presents with    Hypotension     Pt arrived via ems, pt chief complaint was Hypotension.  Pt was sent from [facility] for hypotension and AMS. Pt is usually a/ox1 and seems to be at baseline mental status. Pt bp with ems was 70/50's currently 110/56          HPI: Ms. Jennings is an 88yo lady with a past medical history of DM2, HTN, CVA, dementia, CAD with NSTEMI, and PAfib.  She has no TTE on file here or in Care Everywhere.    She was recently admitted to [facility] on 12/9/22 after being taken to [facility] by her family due to hallucinations and delusions of, "being kidnapped."  She had a PEC placed that same day.  Her son states she was hallucinating people who were not there, such as teenagers running around her house.      I am not sure what occurred at [facility] today, but they did send a clinical sheet over stating, "BP 79/50, RR 14."  From her med list, it appears she had been started on only Risperdal 0.5mg po QHS.  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  She has continued on her Toprol XL and her Flecainide.          Past Medical History:   Diagnosis Date    Diabetes mellitus, type 2     Heart murmur     " Hypertension     Paroxysmal atrial fibrillation     Stroke        Past Surgical History:   Procedure Laterality Date    EYE SURGERY      TONSILLECTOMY      VEIN LIGATION AND STRIPPING         Review of patient's allergies indicates:   Allergen Reactions    Aspirin Other (See Comments)     Bleeding    Codeine     Erythromycin Rash       No current facility-administered medications on file prior to encounter.     Current Outpatient Medications on File Prior to Encounter   Medication Sig    amLODIPine (NORVASC) 5 MG tablet Take 1 tablet (5 mg total) by mouth nightly.    blood sugar diagnostic (ONETOUCH ULTRA TEST) Strp 1 strip by miscellaneous route 2 times daily. DX code 250.00 (Patient taking differently: 1 strip by Misc.(Non-Drug; Combo Route) route 2 (two) times daily. 1 strip by miscellaneous route 2 times daily. DX code 250.00)    blood sugar diagnostic Strp To check BG 2 times daily, to use with insurance preferred meter    clotrimazole (LOTRIMIN) 1 % cream Apply 1 ampule topically 3 (three) times daily.    flecainide (TAMBOCOR) 50 MG Tab Take 1 tablet (50 mg total) by mouth every 12 (twelve) hours.    furosemide (LASIX) 40 MG tablet Take 40 mg by mouth once daily.    gabapentin (NEURONTIN) 100 MG capsule Take 1 capsule (100 mg total) by mouth once daily.    glipiZIDE (GLUCOTROL) 2.5 MG TR24 Take 1 tablet (2.5 mg total) by mouth daily with breakfast. (Patient taking differently: Take 5 mg by mouth daily with breakfast.)    hydrocortisone 2.5 % cream Apply topically 2 (two) times daily.    losartan (COZAAR) 100 MG tablet Take 100 mg by mouth once daily.    metFORMIN (GLUCOPHAGE-XR) 500 MG ER 24hr tablet Take 1 tablet (500 mg total) by mouth daily with breakfast.    metoprolol succinate (TOPROL-XL) 25 MG 24 hr tablet Take 0.5 tablets (12.5 mg total) by mouth once daily. (Patient taking differently: Take 25 mg by mouth once daily.)    mupirocin (BACTROBAN) 2 % ointment Apply topically 3 (three)  times daily.    pantoprazole (PROTONIX) 40 MG tablet Take 1 tablet (40 mg total) by mouth once daily.    potassium chloride SA (K-DUR,KLOR-CON M) 10 MEQ tablet Take 1 tablet (10 mEq total) by mouth once daily.    risperiDONE (RISPERDAL) 0.5 MG Tab Take 0.5 mg by mouth every evening.    zinc oxide 16 % Oint ointment Apply 1 ampule topically once daily.    [DISCONTINUED] apixaban (ELIQUIS) 5 mg Tab Take 1 tablet (5 mg total) by mouth 2 (two) times daily.    [DISCONTINUED] atorvastatin (LIPITOR) 40 MG tablet Take 1 tablet (40 mg total) by mouth nightly.    [DISCONTINUED] furosemide (LASIX) 40 MG tablet Take 0.5 tablets (20 mg total) by mouth once daily. (Patient taking differently: Take 40 mg by mouth once daily.)    [DISCONTINUED] hydrocortisone (ANUSOL-HC) 2.5 % rectal cream Place rectally 2 (two) times daily.    [DISCONTINUED] olmesartan (BENICAR) 40 MG tablet Take 1 tablet (40 mg total) by mouth once daily.     Family History       Problem Relation (Age of Onset)    Heart disease Mother          Tobacco Use    Smoking status: Never    Smokeless tobacco: Never   Substance and Sexual Activity    Alcohol use: No    Drug use: No    Sexual activity: Not Currently     Review of Systems   Unable to perform ROS: Dementia   Objective:     Vital Signs (Most Recent):  Temp: (!) 90.3 °F (32.4 °C) (12/13/22 0235)  Pulse: (!) 49 (12/13/22 0432)  Resp: (!) 27 (12/13/22 0432)  BP: (!) 67/43 (12/13/22 0432)  SpO2: 97 % (12/13/22 0432)   Vital Signs (24h Range):  Temp:  [89 °F (31.7 °C)-98 °F (36.7 °C)] 90.3 °F (32.4 °C)  Pulse:  [48-75] 49  Resp:  [12-35] 27  SpO2:  [95 %-100 %] 97 %  BP: ()/(35-62) 67/43     Weight: 86.2 kg (190 lb)  Body mass index is 29.76 kg/m².    Physical Exam  Constitutional:       General: She is not in acute distress.     Appearance: She is obese. She is ill-appearing. She is not toxic-appearing or diaphoretic.   HENT:      Head: Normocephalic and atraumatic.   Eyes:      General: Lids  "are normal.      Conjunctiva/sclera:      Right eye: Right conjunctiva is not injected.      Left eye: Left conjunctiva is not injected.      Comments: Pupils 2mm and sluggish   Neck:      Thyroid: No thyromegaly.   Cardiovascular:      Rate and Rhythm: Bradycardia present. Rhythm irregularly irregular.      Heart sounds: Murmur heard.   Systolic murmur is present with a grade of 1/6.   Pulmonary:      Effort: Bradypnea present.      Breath sounds: Examination of the right-lower field reveals rales. Examination of the left-lower field reveals rales. Decreased breath sounds and rales present. No wheezing or rhonchi.   Chest:      Chest wall: No deformity or swelling.   Abdominal:      General: Abdomen is protuberant. Bowel sounds are decreased. There is no distension.      Palpations: Abdomen is soft.   Genitourinary:     Comments: No leslie in place (now ordered)  Musculoskeletal:      Cervical back: Neck supple.   Lymphadenopathy:      Comments: 1+ edema to skin   Skin:     General: Skin is cool and dry.   Neurological:      Mental Status: She is lethargic and disoriented.      GCS: GCS eye subscore is 3. GCS verbal subscore is 4. GCS motor subscore is 6.      Comments: After quite a time of sternal rubbing her, I was able to get her perform "finger squeeze" "press on the gas pedal" to all extremities.  Her strength seems adequate for her volition, and equal in all extremitites.     Psychiatric:         Attention and Perception: She is inattentive.         Speech: She is noncommunicative. Speech is slurred.         Behavior: Behavior is slowed and withdrawn.         Cognition and Memory: Cognition is impaired. Memory is impaired. She exhibits impaired recent memory and impaired remote memory.           Significant Labs: All pertinent labs within the past 24 hours have been reviewed.  Recent Results (from the past 24 hour(s))   CBC auto differential    Collection Time: 12/13/22  1:21 AM   Result Value Ref Range    " WBC 12.33 3.90 - 12.70 K/uL    RBC 4.07 4.00 - 5.40 M/uL    Hemoglobin 12.2 12.0 - 16.0 g/dL    Hematocrit 36.8 (L) 37.0 - 48.5 %    MCV 90 82 - 98 fL    MCH 30.0 27.0 - 31.0 pg    MCHC 33.2 32.0 - 36.0 g/dL    RDW 14.7 (H) 11.5 - 14.5 %    Platelets 322 150 - 450 K/uL    MPV 10.4 9.2 - 12.9 fL    Immature Granulocytes 0.3 0.0 - 0.5 %    Gran # (ANC) 10.2 (H) 1.8 - 7.7 K/uL    Immature Grans (Abs) 0.04 0.00 - 0.04 K/uL    Lymph # 1.2 1.0 - 4.8 K/uL    Mono # 0.8 0.3 - 1.0 K/uL    Eos # 0.1 0.0 - 0.5 K/uL    Baso # 0.02 0.00 - 0.20 K/uL    nRBC 0 0 /100 WBC    Gran % 82.9 (H) 38.0 - 73.0 %    Lymph % 9.9 (L) 18.0 - 48.0 %    Mono % 6.3 4.0 - 15.0 %    Eosinophil % 0.4 0.0 - 8.0 %    Basophil % 0.2 0.0 - 1.9 %    Differential Method Automated    Comprehensive metabolic panel    Collection Time: 12/13/22  1:21 AM   Result Value Ref Range    Sodium 142 136 - 145 mmol/L    Potassium 4.4 3.5 - 5.1 mmol/L    Chloride 109 95 - 110 mmol/L    CO2 20 (L) 23 - 29 mmol/L    Glucose 129 (H) 70 - 110 mg/dL    BUN 33 (H) 8 - 23 mg/dL    Creatinine 1.7 (H) 0.5 - 1.4 mg/dL    Calcium 8.8 8.7 - 10.5 mg/dL    Total Protein 7.2 6.0 - 8.4 g/dL    Albumin 3.1 (L) 3.5 - 5.2 g/dL    Total Bilirubin 0.3 0.1 - 1.0 mg/dL    Alkaline Phosphatase 120 55 - 135 U/L    AST 22 10 - 40 U/L    ALT 15 10 - 44 U/L    Anion Gap 13 8 - 16 mmol/L    eGFR 28 (A) >60 mL/min/1.73 m^2   Lactic acid, plasma #1    Collection Time: 12/13/22  1:21 AM   Result Value Ref Range    Lactate (Lactic Acid) 1.7 0.5 - 2.2 mmol/L   Magnesium    Collection Time: 12/13/22  1:21 AM   Result Value Ref Range    Magnesium 1.8 1.6 - 2.6 mg/dL   Phosphorus    Collection Time: 12/13/22  1:21 AM   Result Value Ref Range    Phosphorus 4.6 (H) 2.7 - 4.5 mg/dL   Troponin I    Collection Time: 12/13/22  1:21 AM   Result Value Ref Range    Troponin I 0.017 0.000 - 0.026 ng/mL   Procalcitonin    Collection Time: 12/13/22  1:21 AM   Result Value Ref Range    Procalcitonin 0.11 <0.25 ng/mL    Brain natriuretic peptide    Collection Time: 12/13/22  1:21 AM   Result Value Ref Range     (H) 0 - 99 pg/mL   Urinalysis, Reflex to Urine Culture Urine, Clean Catch    Collection Time: 12/13/22  1:45 AM    Specimen: Urine   Result Value Ref Range    Specimen UA Urine, Catheterized     Color, UA Yellow Yellow, Straw, Yuki    Appearance, UA Clear Clear    pH, UA 5.0 5.0 - 8.0    Specific Gravity, UA 1.010 1.005 - 1.030    Protein, UA Negative Negative    Glucose, UA Negative Negative    Ketones, UA Negative Negative    Bilirubin (UA) Negative Negative    Occult Blood UA Negative Negative    Nitrite, UA Negative Negative    Urobilinogen, UA Negative <2.0 EU/dL    Leukocytes, UA Negative Negative   TSH    Collection Time: 12/13/22  1:45 AM   Result Value Ref Range    TSH 7.608 (H) 0.400 - 4.000 uIU/mL   T4, Free    Collection Time: 12/13/22  1:45 AM   Result Value Ref Range    Free T4 1.22 0.71 - 1.51 ng/dL   ISTAT Lactate    Collection Time: 12/13/22  2:04 AM   Result Value Ref Range    POC Lactate 1.10 0.5 - 2.2 mmol/L    Sample VENOUS     Site Other     Allens Test N/A    ISTAT PROCEDURE    Collection Time: 12/13/22  3:12 AM   Result Value Ref Range    POC PH 7.246 (L) 7.35 - 7.45    POC PCO2 48.9 (H) 35 - 45 mmHg    POC PO2 49 40 - 60 mmHg    POC HCO3 21.3 (L) 24 - 28 mmol/L    POC BE -6 -2 to 2 mmol/L    POC SATURATED O2 77 (L) 95 - 100 %    POC TCO2 23 (L) 24 - 29 mmol/L    Sample VENOUS     Site Other     Allens Test N/A    POCT glucose    Collection Time: 12/13/22  4:35 AM   Result Value Ref Range    POCT Glucose 282 (H) 70 - 110 mg/dL         Significant Imaging: I have reviewed all pertinent imaging results/findings within the past 24 hours.    Assessment/Plan:     * Shock, unspecified  Her BNP is only 136  I suspect some of this is due to medications:   -Doubling of lasix and new ARB  Holding all anti-HTN meds for now  Will continue to bolus IVF until MAP >65  TTE ordered for am  Family wants full  "DNR (no central line or any pressors)  LA is in acceptable range at 1.7  Consider Albumin 25g iv x 1 if fails fluids    Her electrolytes don't speak to adrenal insufficiency (Na not low and K not high)  But will treat empirically with Dex 4mg iv q12 while ACTH stim test is pending  Her procal is normal, and no source of infection noted, so sepsis seems less likely as well  If all else fails and no improvement, consider empiric CT chest, abdomen and pelvis.   -On empiric Vanco and Rocephin      Bradycardia  I tried Glucogon 5mg iv given Toprol use - no effect  I suspect this is effect of BB + Flecainide  Hopefully this will wash out  Family does not want Atropine, Dobutamine or Levophed ("code drugs")  Will try beta agonists with duonebs as last try      Hypothermia  Her TSH is mildly elevated though FT4 is normal   -Regardless, will try Synthroid 25 mcg iv qday  Warming blanket in place  Phenothiazine/atypical antipsychotics can induce poikilothermia, but low dose Risperdal 0.5mg HS seems unlikely        Type 2 diabetes mellitus without complication, without long-term current use of insulin  Patient's FSGs are controlled on current medication regimen.  Last A1c reviewed-   Lab Results   Component Value Date    HGBA1C 5.9 08/30/2022     Most recent fingerstick glucose reviewed-   Recent Labs   Lab 12/13/22  0435   POCTGLUCOSE 282*     Current correctional scale  Low  Maintain anti-hyperglycemic dose as follows-   Antihyperglycemics (From admission, onward)    Start     Stop Route Frequency Ordered    12/13/22 0525  insulin aspart U-100 pen 0-5 Units         -- SubQ Every 6 hours PRN 12/13/22 0425        Hold Oral hypoglycemics while patient is in the hospital.    KIRSTIN (acute kidney injury)  Patient with acute kidney injury likely due to IVVD/dehydration KIRSTIN is currently worsening. Labs reviewed- Renal function/electrolytes with Estimated Creatinine Clearance: 25.3 mL/min (A) (based on SCr of 1.7 mg/dL (H)). according " "to latest data. Monitor urine output and serial BMP and adjust therapy as needed. Avoid nephrotoxins and renally dose meds for GFR listed above.     Hold ACE and Lasix  Trial of fluids     Latest Reference Range & Units 09/08/21 04:37 08/30/22 10:45 12/09/22 14:27 12/13/22 01:21   Creatinine 0.5 - 1.4 mg/dL 0.8 1.29 (H) 1.4 1.7 (H)          Acute encephalopathy  She has baseline dementia, but seems pretty high functioning per sons at normal baseline  Likely metabolic due to hypotension, KIRSTIN and bradycardia  AFib not on anticoagulation is CVA risk, so ordered MRI brain for am   -ASA listed as "intolerance" due to bleeding      PAF (paroxysmal atrial fibrillation)  Patient with Long standing persistent (>12 months) atrial fibrillation which is uncontrolled currently with Beta Blocker and Flecainide. Patient is currently in atrial fibrillation.[id number] Score: 5.  Anticoagulation indicated. Anticoagulation done with Eliquis in the past, currently not on anticoagulation.  Will need to rule out large CVA with MRI and discuss risks with family first..    Consult Cardiology due to bradycardia        History of CVA (cerebrovascular accident)  MRI brain in am given Afib off anticoagulation    Coronary artery disease involving native coronary artery of native heart without angina pectoris  Will trend trop  Holding statin and BB since NPO and in shock  TTE in am      DNR (do not resuscitate)  See ACP note at admission.        VTE Risk Mitigation (From admission, onward)         Ordered     heparin (porcine) injection 5,000 Units  Every 8 hours         12/13/22 0431     IP VTE HIGH RISK PATIENT  Once         12/13/22 0431     Place sequential compression device  Until discontinued         12/13/22 0431     Place LEONCIO hose  Until discontinued         12/13/22 0431               Critical care time spent on the evaluation and treatment of severe organ dysfunction, review of pertinent labs and imaging studies, discussions with " consulting providers and discussions with patient/family: 80 minutes.    SANDEE Rizvi MD  Department of Hospital Medicine   [facility] - Emergency Dept

## 2022-12-13 NOTE — PLAN OF CARE
"Initial assessment completed at bed side to discuss how patient will manage her care at the next level.  Roll of CM discussed.  Patient identified by using 2 identifiers:  Name and date of birth.  DC plan discussed with patient and son, Isidro.  Isidro stated that he thinks she needs to go to "rehab" at [place] (aka:  SNF) then become a resident.  Patient choice form completed and signed by sonIsidro.  Isidro agreed to SNF on the [facility] choice.  CM will continue to follow and finalize plans  CM provided Isidro with contact info and encouraged them to call for any discharge needs.    Assessment finding will be shared with treatment team at [facility]'s     [facility] Intensive [facility]  Initial Discharge Assessment       Primary Care Provider: Misael Jasmine MD    Admission Diagnosis: Dehydration [E86.0]  Bradycardia [R00.1]  Shock [R57.9]  Chest pain [R07.9]  Hypotension [I95.9]  Hypothermia, initial encounter [T68.XXXA]  Altered mental status, unspecified altered mental status type [R41.82]    Admission Date: 12/13/2022  Expected Discharge Date:     Discharge Barriers Identified: None    Payor: MEDICARE / Plan: MEDICARE PART A & B / Product Type: Government /     Extended Emergency Contact Information  Primary Emergency Contact: Jody Jennings  Address: [address] [place] [address] [facility]  Home Phone: [phone]  Work Phone: [phone]  Mobile Phone: [phone]  Relation: Daughter  Preferred language: English   needed? No  Secondary Emergency Contact: Drew Jennings  Address: [address] [place] [address] [facility]  Home Phone: [phone]  Mobile Phone: [phone]  Relation: Son  Preferred language: English   needed? No    Discharge Plan A: Skilled Nursing Facility  Discharge Plan B: New Nursing Home placement - [facility] care facility      [id number] [place]  [address]  [address] [place] " "[place] [address]  Phone: [phone] Fax: [phone]    [facility] - [facility] AT Portal to Registered [facility] Sites  [facility]  [place] [address]  Phone: [phone] Fax: [phone]      Initial Assessment (most recent)       Adult Discharge Assessment - 12/13/22 1433          Discharge Assessment    Assessment Type Discharge Planning Assessment     Confirmed/corrected address, phone number and insurance Yes     Confirmed Demographics Correct on Facesheet     Source of Information family   Son, Isidro at bedside    When was your last doctors appointment? --   unknown    Communicated JORGE with patient/caregiver Date not available/Unable to determine     Reason For Admission "BP problem"     People in Home child(rukhsana), adult     Do you expect to return to your current living situation? No     Do you have help at home or someone to help you manage your care at home? Yes     Who are your caregiver(s) and their phone number(s)? Son Drew and his wife  (other children visit frequesntly and help)     Prior to hospitilization cognitive status: Unable to Assess     Current cognitive status: Not Oriented to Place;Not Oriented to Time     Walking or Climbing Stairs ambulation difficulty, requires equipment     Dressing/Bathing bathing difficulty, assistance 1 person     Home Layout Able to live on 1st floor     Equipment Currently Used at Home walker, rolling;wheelchair;shower chair     Readmission within 30 days? No     Patient currently being followed by outpatient case management? No     Do you currently have service(s) that help you manage your care at home? No     Do you take prescription medications? Yes     Do you have prescription coverage? Yes     Coverage Medicare     Do you have any problems affording any of your prescribed medications? No     Is the patient taking medications as prescribed? yes     Who is going to help you get home at discharge? Adult " children     How do you get to doctors appointments? family or friend will provide     Are you on dialysis? No     Do you take coumadin? No     Discharge Plan A Skilled Nursing Facility     Discharge Plan B New Nursing Home placement - [facility] care facility     DME Needed Upon Discharge  none     Discharge Plan discussed with: Adult children   Isidro De La Vega at bedside    Discharge Barriers Identified None        OTHER    Name(s) of People in Home Drew De La Vega and daughter-in-law

## 2022-12-13 NOTE — ASSESSMENT & PLAN NOTE
Patient with acute kidney injury likely due to IVVD/dehydration KIRSTIN is currently worsening. Labs reviewed- Renal function/electrolytes with Estimated Creatinine Clearance: 25.3 mL/min (A) (based on SCr of 1.7 mg/dL (H)). according to latest data. Monitor urine output and serial BMP and adjust therapy as needed. Avoid nephrotoxins and renally dose meds for GFR listed above.     Hold ACE and Lasix  Trial of fluids     Latest Reference Range & Units 09/08/21 04:37 08/30/22 10:45 12/09/22 14:27 12/13/22 01:21   Creatinine 0.5 - 1.4 mg/dL 0.8 1.29 (H) 1.4 1.7 (H)

## 2022-12-13 NOTE — ASSESSMENT & PLAN NOTE
Her TSH is mildly elevated though FT4 is normal   - will try Synthroid 25 mcg iv qday  Warming blanket in place  Phenothiazine/atypical antipsychotics can induce poikilothermia, but low dose Risperdal 0.5mg HS seems unlikely

## 2022-12-13 NOTE — NURSING
PT CEC/PEC order discontinued. Pt taken to MRI and returned safely. A-Fib on monitor, pt on room air. Pt was able to tolerate few sips of water to take oral medicine new orders for speech consult. Pt is awake and alert oriented to self and date. Pt admitted with altered skin integrity, wounds charted and new consult for wound care. Multiple family members have been to bedside. No falls, injuries, or skin breakdown.     1800: Bladder scan reading 70cc will continue to monitor.

## 2022-12-13 NOTE — HPI
"From H&P: "Ms. Jennings is an 88yo lady with a past medical history of DM2, HTN, CVA, dementia, CAD with NSTEMI, and PAfib.  She has no TTE on file here or in Care Everywhere.     She was recently admitted to [facility] on 12/9/22 after being taken to [facility] by her family due to hallucinations and delusions of, "being kidnapped."  She had a PEC placed that same day.  Her son states she was hallucinating people who were not there, such as teenagers running around her house.       I am not sure what occurred at [facility] today, but they did send a clinical sheet over stating, "BP 79/50, RR 14."  From her med list, it appears she had been started on only Risperdal 0.5mg po QHS.  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 vs [facility], it appears she had her lasix doubled from 20mg to 40mg, Eliquis fell off her therapy plan for her Afib, and her ARB was changed from Olmesartan to Losartan 100mg.  She has continued on her Toprol XL and her Flecainide."     Palliative medicine consulted for advance care planning and continuity of care; Met with pt at bedside and communicated with family; for details of visit, see advance care planning section of plan.     "

## 2022-12-13 NOTE — ASSESSMENT & PLAN NOTE
Patient's FSGs are controlled on current medication regimen.  Last A1c reviewed-   Lab Results   Component Value Date    HGBA1C 5.9 08/30/2022     Most recent fingerstick glucose reviewed-   Recent Labs   Lab 12/13/22  4635 12/13/22  0887   POCTGLUCOSE 282* 176*     Current correctional scale  Low  Maintain anti-hyperglycemic dose as follows-   Antihyperglycemics (From admission, onward)    Start     Stop Route Frequency Ordered    12/13/22 0525  insulin aspart U-100 pen 0-5 Units         -- SubQ Every 6 hours PRN 12/13/22 0595        Will review home medication list with patient's family today

## 2022-12-13 NOTE — ASSESSMENT & PLAN NOTE
I suspect some of this is due to medications. No evidence of sepsis currently. Does not appear volume overloaded. ACTH stim test negative.   - follow up TTE  - follow up cultures  - for now, will continue IVF and antibiotics

## 2022-12-13 NOTE — ACP (ADVANCE CARE PLANNING)
"I was able to speak with both of Ms. Jennings's sons, including . Isidro Jennings, who has her health care power of .  I left a voice message for her daughter to call for an update as well.    I updated her sons on her hypotension, bradycardia, and near-obtunded state.  Mr. Isidro Jennings was very clear that, "she has lived a very long good life to 88yo, and we don't want to put her through that."    He does not want any central line, pressor agents, chemical or electrical cardioversion, intubation, or "code" medications.  He is okay with her receiving IV fluids and antibiotics.  They are planning to come to the [facility] today to see her.    Total ACP time: 30 minutes.  "

## 2022-12-13 NOTE — ASSESSMENT & PLAN NOTE
"I tried Glucogon 5mg iv given Toprol use - no effect  I suspect this is effect of BB + Flecainide  Hopefully this will wash out  Family does not want Atropine, Dobutamine or Levophed ("code drugs")  Will try beta agonists with duonebs as last try    "

## 2022-12-13 NOTE — PROGRESS NOTES
Pharmacokinetic Initial Assessment: IV Vancomycin    Assessment/Plan:    Initiate intravenous vancomycin with loading dose of 1750 mg once with subsequent doses when random concentrations are less than 20 mcg/mL  Desired empiric serum trough concentration is 10 to 20 mcg/mL  Draw vancomycin random level on 12/14/22 at 0300.  Pharmacy will continue to follow and monitor vancomycin.      Please contact pharmacy at extension [phone] with any questions regarding this assessment.     Thank you for the consult,   Armando Quiroz       Patient brief summary:  Tierra Jennings is a 89 y.o. female initiated on antimicrobial therapy with IV Vancomycin for treatment of suspected bacteremia    Drug Allergies:   Review of patient's allergies indicates:   Allergen Reactions    Aspirin Other (See Comments)     Bleeding    Codeine     Erythromycin Rash       Actual Body Weight:   86.2 kg    Renal Function:   Estimated Creatinine Clearance: 25.3 mL/min (A) (based on SCr of 1.7 mg/dL (H)).,     Dialysis Method (if applicable):  N/A    CBC (last 72 hours):  Recent Labs   Lab Result Units 12/13/22  0121   WBC K/uL 12.33   Hemoglobin g/dL 12.2   Hematocrit % 36.8*   Platelets K/uL 322   Gran % % 82.9*   Lymph % % 9.9*   Mono % % 6.3   Eosinophil % % 0.4   Basophil % % 0.2   Differential Method  Automated       Metabolic Panel (last 72 hours):  Recent Labs   Lab Result Units 12/13/22  0121 12/13/22  0145   Sodium mmol/L 142  --    Potassium mmol/L 4.4  --    Chloride mmol/L 109  --    CO2 mmol/L 20*  --    Glucose mg/dL 129*  --    Glucose, UA   --  Negative   BUN mg/dL 33*  --    Creatinine mg/dL 1.7*  --    Albumin g/dL 3.1*  --    Total Bilirubin mg/dL 0.3  --    Alkaline Phosphatase U/L 120  --    AST U/L 22  --    ALT U/L 15  --    Magnesium mg/dL 1.8  --    Phosphorus mg/dL 4.6*  --        Drug levels (last 3 results):  No results for input(s): VANCOMYCINRA, VANCORANDOM, VANCOMYCINPE, VANCOPEAK, VANCOMYCINTR, VANCOTROUGH in  the last 72 hours.    Microbiologic Results:  Microbiology Results (last 7 days)       Procedure Component Value Units Date/Time    Blood culture x two cultures. Draw prior to antibiotics. [[id number]] Collected: 12/13/22 0125    Order Status: Sent Specimen: Blood from Peripheral, Hand, Left Updated: 12/13/22 0129    Blood culture x two cultures. Draw prior to antibiotics. [[id number]] Collected: 12/13/22 0119    Order Status: Sent Specimen: Blood from Wrist, Right Updated: 12/13/22 0123

## 2022-12-13 NOTE — SUBJECTIVE & OBJECTIVE
Patient History               Medical as of 12/13/2022       Past Medical History       Diagnosis Date Comments Source    Diabetes mellitus, type 2 -- -- Provider    Heart murmur -- -- Provider    History of psychiatric hospitalization -- -- Provider    Hypertension -- -- Provider    Paroxysmal atrial fibrillation -- -- Provider    Stroke -- -- Provider              Pertinent Negatives       Diagnosis Date Noted Comments Source    Hx of psychiatric care 12/13/2022 -- Provider    Psychiatric problem 12/13/2022 -- Provider    Suicide attempt 12/13/2022 -- Provider    Therapy 12/13/2022 -- Provider                          Surgical as of 12/13/2022       Past Surgical History       Procedure Laterality Date Comments Source    TONSILLECTOMY -- -- -- Provider    EYE SURGERY -- -- -- Provider    VEIN LIGATION AND STRIPPING -- -- -- Provider                          Family as of 12/13/2022       Problem Relation Name Age of Onset Comments Source    Heart disease Mother -- -- -- Provider                  Tobacco Use as of 12/13/2022       Smoking Status Smoking Start Date Quit Date Smoking Frequency    Never -- --       Smokeless Status Smokeless Type Smokeless Quit Date    Never -- --      Source    Provider                  Alcohol Use as of 12/13/2022       Alcohol Use Drinks/Week Alcohol/Week Comments Source    No   -- -- Provider                  Drug Use as of 12/13/2022       Drug Use Types Frequency Comments Source    No -- -- -- Provider                  Sexual Activity as of 12/13/2022       Sexually Active Birth Control Partners Comments Source    Not Currently -- -- -- Provider                  Activities of Daily Living as of 12/13/2022    None               Social Documentation as of 12/13/2022    None               Occupational as of 12/13/2022    None               Socioeconomic as of 12/13/2022       Marital Status Spouse Name Number of Children Years Education Education Level Preferred Language  Ethnicity Race Source     -- 5 -- -- English Not  or /a White, Unknown Provider                  Pertinent History       Question Response Comments    Lives with family son lives with    Place in Birth Order -- --    Lives in [place]    Number of Siblings -- --    Raised by -- [place]    Legal Involvement -- --    Childhood Trauma -- --    Criminal History of -- --    Financial Status unemployed --    Highest Level of Education -- --    Does patient have access to a firearm? -- --     Service -- --    Primary Leisure Activity -- --    Spirituality -- --          Past Medical History:   Diagnosis Date    Diabetes mellitus, type 2     Heart murmur     History of psychiatric hospitalization     Hypertension     Paroxysmal atrial fibrillation     Stroke      Past Surgical History:   Procedure Laterality Date    EYE SURGERY      TONSILLECTOMY      VEIN LIGATION AND STRIPPING       Family History       Problem Relation (Age of Onset)    Heart disease Mother          Tobacco Use    Smoking status: Never    Smokeless tobacco: Never   Substance and Sexual Activity    Alcohol use: No    Drug use: No    Sexual activity: Not Currently     Review of patient's allergies indicates:   Allergen Reactions    Aspirin Other (See Comments)     Bleeding    Codeine     Erythromycin Rash       No current facility-administered medications on file prior to encounter.     Current Outpatient Medications on File Prior to Encounter   Medication Sig    blood sugar diagnostic (ONETOUCH ULTRA TEST) Strp 1 strip by miscellaneous route 2 times daily. DX code 250.00 (Patient taking differently: 1 strip by Misc.(Non-Drug; Combo Route) route 2 (two) times daily. 1 strip by miscellaneous route 2 times daily. DX code 250.00)    blood sugar diagnostic Strp To check BG 2 times daily, to use with insurance preferred meter    clotrimazole (LOTRIMIN) 1 % cream Apply 1 ampule topically 3 (three) times daily.    flecainide  (TAMBOCOR) 50 MG Tab Take 1 tablet (50 mg total) by mouth every 12 (twelve) hours.    furosemide (LASIX) 40 MG tablet Take 40 mg by mouth once daily.    gabapentin (NEURONTIN) 100 MG capsule Take 1 capsule (100 mg total) by mouth once daily.    glipiZIDE (GLUCOTROL) 2.5 MG TR24 Take 1 tablet (2.5 mg total) by mouth daily with breakfast.    losartan (COZAAR) 100 MG tablet Take 100 mg by mouth once daily.    metFORMIN (GLUCOPHAGE-XR) 500 MG ER 24hr tablet Take 1 tablet (500 mg total) by mouth daily with breakfast.    metoprolol succinate (TOPROL-XL) 25 MG 24 hr tablet Take 0.5 tablets (12.5 mg total) by mouth once daily. (Patient taking differently: Take 25 mg by mouth once daily.)    mupirocin (BACTROBAN) 2 % ointment Apply topically 3 (three) times daily.    potassium chloride SA (K-DUR,KLOR-CON M) 10 MEQ tablet Take 1 tablet (10 mEq total) by mouth once daily.    risperiDONE (RISPERDAL) 0.5 MG Tab Take 0.5 mg by mouth every evening.    zinc oxide 16 % Oint ointment Apply 1 ampule topically once daily.    amLODIPine (NORVASC) 5 MG tablet Take 1 tablet (5 mg total) by mouth nightly. (Patient not taking: Reported on 12/13/2022)    hydrocortisone 2.5 % cream Apply topically 2 (two) times daily. (Patient not taking: Reported on 12/13/2022)    pantoprazole (PROTONIX) 40 MG tablet Take 1 tablet (40 mg total) by mouth once daily. (Patient not taking: Reported on 12/13/2022)    [DISCONTINUED] apixaban (ELIQUIS) 5 mg Tab Take 1 tablet (5 mg total) by mouth 2 (two) times daily.    [DISCONTINUED] atorvastatin (LIPITOR) 40 MG tablet Take 1 tablet (40 mg total) by mouth nightly.    [DISCONTINUED] furosemide (LASIX) 40 MG tablet Take 0.5 tablets (20 mg total) by mouth once daily. (Patient taking differently: Take 40 mg by mouth once daily.)    [DISCONTINUED] hydrocortisone (ANUSOL-HC) 2.5 % rectal cream Place rectally 2 (two) times daily.    [DISCONTINUED] olmesartan (BENICAR) 40 MG tablet Take 1 tablet (40 mg total) by mouth  "once daily.     Psychotherapeutics (From admission, onward)      None          Review of Systems   Unable to perform ROS: Mental status change   Strengths and Liabilities: Liability: Patient is dependent., Liability: Patient has poor health., Liability: Patient has possible cognitive impairment., Liability: Patient lacks coping skills.    Objective:     Vital Signs (Most Recent):  Temp: 97.1 °F (36.2 °C) (12/13/22 1600)  Pulse: 88 (12/13/22 1600)  Resp: 14 (12/13/22 1600)  BP: (!) 104/55 (12/13/22 1600)  SpO2: 98 % (12/13/22 1600)   Vital Signs (24h Range):  Temp:  [89 °F (31.7 °C)-98 °F (36.7 °C)] 97.1 °F (36.2 °C)  Pulse:  [42-95] 88  Resp:  [6-35] 14  SpO2:  [91 %-100 %] 98 %  BP: ()/(28-84) 104/55     Height: 5' 7" (170.2 cm)  Weight: 80 kg (176 lb 5.9 oz)  Body mass index is 27.62 kg/m².      Intake/Output Summary (Last 24 hours) at 12/13/2022 1626  Last data filed at 12/13/2022 1600  Gross per 24 hour   Intake 3009.3 ml   Output 450 ml   Net 2559.3 ml       Physical Exam  Psychiatric:      Comments: EXAMINATION    CONSTITUTIONAL  General Appearance: hospital attire; sickly; weak    MUSCULOSKELETAL  Muscle Strength and Tone: normal  Abnormal Involuntary Movements: none noted  Gait and Station: not observed    PSYCHIATRIC MENTAL STATUS EXAM   Level of Consciousness: awake and alert  Orientation: first name (prompted for last name), city, place, vague to situation  Grooming: limited  Psychomotor Behavior: slowed  Speech: soft; pressured  Language: no abnormalities  Mood: "good"  Affect: blunted  Thought Process: tangential  Associations: intact  Thought Content: denies suicidal/homicidal/psychosis  Memory: better intact to remote; some confusion with recent  Attention: mildly distracted  Fund of Knowledge: simple to conversation  Insight: limited into hospital care  Judgment: fair into cooperation with care            Significant Labs: All pertinent labs within the past 24 hours have been " reviewed.    Significant Imaging: I have reviewed all pertinent imaging results/findings within the past 24 hours.

## 2022-12-13 NOTE — ED TRIAGE NOTES
Pt arrived via ems, pt chief complaint was Hypotension. Pt was sent from [facility] for hypotension and AMS. Pt is usually a/ox1 and seems to be at baseline mental status. Pt bp with ems was 70/50's currently 110/56.

## 2022-12-13 NOTE — HPI
"Ms. Jennings is an 88yo lady with a past medical history of DM2, HTN, CVA, dementia, CAD with NSTEMI, and PAfib.  She has no TTE on file here or in Care Everywhere.    She was recently admitted to [facility] on 12/9/22 after being taken to [facility] by her family due to hallucinations and delusions of, "being kidnapped."  She had a PEC placed that same day.  Her son states she was hallucinating people who were not there, such as teenagers running around her house.      I am not sure what occurred at [facility] today, but they did send a clinical sheet over stating, "BP 79/50, RR 14."  From her med list, it appears she had been started on only Risperdal 0.5mg po QHS.  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  She has continued on her Toprol XL and her Flecainide.      "
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
## 2022-12-13 NOTE — ASSESSMENT & PLAN NOTE
"She has baseline dementia, but seems pretty high functioning per sons at normal baseline  Likely metabolic due to hypotension, KIRSTIN and bradycardia  AFib not on anticoagulation is CVA risk, so ordered MRI brain for am   -ASA listed as "intolerance" due to bleeding    "

## 2022-12-13 NOTE — CONSULTS
[facility] - Intensive Care  Neurology  Consult Note    Patient Name: Tierra Jennings  MRN: [id number]  Admission Date: 12/13/2022  Hospital Length of Stay: 0 days  Code Status: DNR   Attending Provider: Bernarda Dennison MD   Consulting Provider: Riccardo Granger MD  Primary Care Physician: Misael Jasmine MD  Principal Problem:Shock, unspecified    Inpatient consult to Neurology  Consult performed by: Riccardo Granger MD  Consult ordered by: Bernarda Dennison MD      Subjective:     Chief Complaint:  AMS    HPI: 88 y/o female with medical Hx of DM, HTN, PAF who comes to ED from behavioral facility due to AMS, hypotension. She was also found to be hypothermic. She was at [facility] due to hallucinations after an admission to another hospital for delusions and hallucinations. On arrival pt seemed to be sedated and poorly responsive. Today she is more aware of her surrounding but is nonsensical and seems to be having visual hallucinations.    Past Medical History:   Diagnosis Date    Diabetes mellitus, type 2     Heart murmur     Hypertension     Paroxysmal atrial fibrillation     Stroke        Past Surgical History:   Procedure Laterality Date    EYE SURGERY      TONSILLECTOMY      VEIN LIGATION AND STRIPPING         Review of patient's allergies indicates:   Allergen Reactions    Aspirin Other (See Comments)     Bleeding    Codeine     Erythromycin Rash       Current Neurological Medications:     No current facility-administered medications on file prior to encounter.     Current Outpatient Medications on File Prior to Encounter   Medication Sig    blood sugar diagnostic (ONETOUCH ULTRA TEST) Strp 1 strip by miscellaneous route 2 times daily. DX code 250.00 (Patient taking differently: 1 strip by Misc.(Non-Drug; Combo Route) route 2 (two) times daily. 1 strip by miscellaneous route 2 times daily. DX code 250.00)    blood sugar diagnostic Strp To check BG 2 times daily, to use with insurance preferred meter     clotrimazole (LOTRIMIN) 1 % cream Apply 1 ampule topically 3 (three) times daily.    flecainide (TAMBOCOR) 50 MG Tab Take 1 tablet (50 mg total) by mouth every 12 (twelve) hours.    furosemide (LASIX) 40 MG tablet Take 40 mg by mouth once daily.    gabapentin (NEURONTIN) 100 MG capsule Take 1 capsule (100 mg total) by mouth once daily.    glipiZIDE (GLUCOTROL) 2.5 MG TR24 Take 1 tablet (2.5 mg total) by mouth daily with breakfast.    losartan (COZAAR) 100 MG tablet Take 100 mg by mouth once daily.    metFORMIN (GLUCOPHAGE-XR) 500 MG ER 24hr tablet Take 1 tablet (500 mg total) by mouth daily with breakfast.    metoprolol succinate (TOPROL-XL) 25 MG 24 hr tablet Take 0.5 tablets (12.5 mg total) by mouth once daily. (Patient taking differently: Take 25 mg by mouth once daily.)    mupirocin (BACTROBAN) 2 % ointment Apply topically 3 (three) times daily.    potassium chloride SA (K-DUR,KLOR-CON M) 10 MEQ tablet Take 1 tablet (10 mEq total) by mouth once daily.    risperiDONE (RISPERDAL) 0.5 MG Tab Take 0.5 mg by mouth every evening.    zinc oxide 16 % Oint ointment Apply 1 ampule topically once daily.    amLODIPine (NORVASC) 5 MG tablet Take 1 tablet (5 mg total) by mouth nightly. (Patient not taking: Reported on 12/13/2022)    hydrocortisone 2.5 % cream Apply topically 2 (two) times daily. (Patient not taking: Reported on 12/13/2022)    pantoprazole (PROTONIX) 40 MG tablet Take 1 tablet (40 mg total) by mouth once daily. (Patient not taking: Reported on 12/13/2022)    [DISCONTINUED] apixaban (ELIQUIS) 5 mg Tab Take 1 tablet (5 mg total) by mouth 2 (two) times daily.    [DISCONTINUED] atorvastatin (LIPITOR) 40 MG tablet Take 1 tablet (40 mg total) by mouth nightly.    [DISCONTINUED] furosemide (LASIX) 40 MG tablet Take 0.5 tablets (20 mg total) by mouth once daily. (Patient taking differently: Take 40 mg by mouth once daily.)    [DISCONTINUED] hydrocortisone (ANUSOL-HC) 2.5 % rectal cream Place rectally 2 (two) times  daily.    [DISCONTINUED] olmesartan (BENICAR) 40 MG tablet Take 1 tablet (40 mg total) by mouth once daily.      Family History       Problem Relation (Age of Onset)    Heart disease Mother          Tobacco Use    Smoking status: Never    Smokeless tobacco: Never   Substance and Sexual Activity    Alcohol use: No    Drug use: No    Sexual activity: Not Currently     Review of Systems   Unable to perform ROS: Mental status change   Objective:     Vital Signs (Most Recent):  Temp: 96 °F (35.6 °C) (12/13/22 1134)  Pulse: 86 (12/13/22 1500)  Resp: 20 (12/13/22 1500)  BP: (!) 98/50 (12/13/22 1500)  SpO2: 97 % (12/13/22 1500)   Vital Signs (24h Range):  Temp:  [89 °F (31.7 °C)-98 °F (36.7 °C)] 96 °F (35.6 °C)  Pulse:  [42-95] 86  Resp:  [6-35] 20  SpO2:  [91 %-100 %] 97 %  BP: ()/(28-84) 98/50     Weight: 80 kg (176 lb 5.9 oz)  Body mass index is 27.62 kg/m².    Physical Exam  Constitutional:       General: She is not in acute distress.  HENT:      Head: Normocephalic.   Eyes:      General:         Right eye: No discharge.         Left eye: No discharge.   Cardiovascular:      Rate and Rhythm: Normal rate.   Pulmonary:      Breath sounds: Normal breath sounds.   Abdominal:      Palpations: Abdomen is soft.   Musculoskeletal:         General: No swelling.      Cervical back: Neck supple.   Skin:     Findings: No rash.   Psychiatric:         Speech: Speech normal.       NEUROLOGICAL EXAMINATION:     MENTAL STATUS   Attention: decreased. Concentration: decreased.   Speech: speech is normal   Level of consciousness: alert    CRANIAL NERVES     CN III, IV, VI   Right pupil: Size: 3 mm. Shape: regular.   Left pupil: Size: 3 mm. Shape: regular.   Ophthalmoparesis: none  Conjugate gaze: present    CN VII   Right facial weakness: none  Left facial weakness: none    CN XII   Tongue deviation: none    MOTOR EXAM        AROM of UE's and LE's against gravity     Significant Labs: CBC:   Recent Labs   Lab 12/13/22  0121  12/13/22  0545   WBC 12.33 10.92   HGB 12.2 9.6*   HCT 36.8* 29.4*    228     CMP:   Recent Labs   Lab 12/13/22  0121 12/13/22  0545   * 295*    138   K 4.4 3.8    114*   CO2 20* 17*   BUN 33* 33*   CREATININE 1.7* 1.6*   CALCIUM 8.8 7.2*   MG 1.8 1.4*   PROT 7.2 5.1*   ALBUMIN 3.1* 2.2*   BILITOT 0.3 0.3   ALKPHOS 120 80   AST 22 17   ALT 15 12   ANIONGAP 13 7*     Urine Studies:   Recent Labs   Lab 12/13/22  0145   COLORU Yellow   APPEARANCEUA Clear   PHUR 5.0   SPECGRAV 1.010   PROTEINUA Negative   GLUCUA Negative   KETONESU Negative   BILIRUBINUA Negative   OCCULTUA Negative   NITRITE Negative   UROBILINOGEN Negative   LEUKOCYTESUR Negative       Significant Imaging: I have reviewed and interpreted all pertinent imaging results/findings within the past 24 hours.    MRI brain  Impression:     No acute intracranial abnormality.  No acute hemorrhage or infarct.     Stable encephalomalacia in the left occipital lobe compatible with sequela of remote infarct.  Stable remote lacunar infarct in left basal ganglia.  Additional chronic senescent changes including generalized cerebral volume loss and chronic microvascular ischemic changes.        Electronically signed by: Zeferino Haynes  Date:                                            12/13/2022  Time:                                           13:25    Assessment and Plan:     88 y/o female consulted for AMS    Encephalopathy: pt seems to be in a state of delirium. Perhaps pt was over sedate with antipsychotics as well. MRI brain shows no acute abnormalities but volume loss and multiple old lacunar infarcts. Pt may also have cognitive impairment.  Stop all potentially sedating meds.  Will reassess.    Active Diagnoses:    Diagnosis Date Noted POA    PRINCIPAL PROBLEM:  Shock, unspecified [R57.9] 12/13/2022 Yes    Hypothermia [T68.XXXA] 12/13/2022 Yes    Bradycardia [R00.1] 12/13/2022 Yes    DNR (do not resuscitate) [Z66] 12/13/2022 Yes     Acute encephalopathy [G93.40] 12/13/2022 Yes    Coronary artery disease involving native coronary artery of native heart without angina pectoris [I25.10] 09/05/2021 Yes    KIRSTIN (acute kidney injury) [N17.9] 09/05/2021 Yes    Persistent atrial fibrillation [I48.19] 11/06/2020 Yes    History of CVA (cerebrovascular accident) [Z86.73] 11/06/2020 Not Applicable    Type 2 diabetes mellitus without complication, without long-term current use of insulin [E11.9] 10/01/2013 Yes      Problems Resolved During this Admission:       VTE Risk Mitigation (From admission, onward)           Ordered     apixaban tablet 2.5 mg  2 times daily         12/13/22 1145     IP VTE HIGH RISK PATIENT  Once         12/13/22 0431     Place sequential compression device  Until discontinued         12/13/22 0431     Place LEONCIO hose  Until discontinued         12/13/22 0431                    Thank you for your consult. I will follow-up with patient. Please contact us if you have any additional questions.    Riccardo Granger MD  Neurology  [facility] - [place] - Intensive Care

## 2022-12-13 NOTE — PHARMACY MED REC
"Admission Medication History     The home medication history was taken by Kathy Engle CPhT.      You may go to "Admission" then "Reconcile Home Medications" tabs to review and/or act upon these items.     The home medication list has been updated by the Pharmacy department.   Please read ALL comments highlighted in yellow.   Please address this information as you see fit.    Feel free to contact us if you have any questions or require assistance.        Medications listed below were obtained from: Patient/family and Analytic software- [employer]  PTA Medications   Medication Sig    blood sugar diagnostic (ONETOUCH ULTRA TEST) Strp 1 strip by miscellaneous route 2 times daily. DX code 250.00 (Patient taking differently: 1 strip by Misc.(Non-Drug; Combo Route) route 2 (two) times daily. 1 strip by miscellaneous route 2 times daily. DX code 250.00)    blood sugar diagnostic Strp To check BG 2 times daily, to use with insurance preferred meter    clotrimazole (LOTRIMIN) 1 % cream Apply 1 ampule topically 3 (three) times daily.    flecainide (TAMBOCOR) 50 MG Tab Take 1 tablet (50 mg total) by mouth every 12 (twelve) hours.    furosemide (LASIX) 40 MG tablet Take 40 mg by mouth once daily.    gabapentin (NEURONTIN) 100 MG capsule Take 1 capsule (100 mg total) by mouth once daily.    glipiZIDE (GLUCOTROL) 2.5 MG TR24 Take 1 tablet (2.5 mg total) by mouth daily with breakfast.    losartan (COZAAR) 100 MG tablet Take 100 mg by mouth once daily.    metFORMIN (GLUCOPHAGE-XR) 500 MG ER 24hr tablet Take 1 tablet (500 mg total) by mouth daily with breakfast.    metoprolol succinate (TOPROL-XL) 25 MG 24 hr tablet Take 0.5 tablets (12.5 mg total) by mouth once daily. (Patient taking differently: Take 25 mg by mouth once daily.)    mupirocin (BACTROBAN) 2 % ointment Apply topically 3 (three) times daily.    potassium chloride SA (K-DUR,KLOR-CON M) 10 MEQ tablet Take 1 tablet (10 mEq total) by mouth once daily.    risperiDONE " (RISPERDAL) 0.5 MG Tab Take 0.5 mg by mouth every evening.    zinc oxide 16 % Oint ointment Apply 1 ampule topically once daily.    amLODIPine (NORVASC) 5 MG tablet Take 1 tablet (5 mg total) by mouth nightly. (Patient not taking: Reported on 12/13/2022)    hydrocortisone 2.5 % cream Apply topically 2 (two) times daily. (Patient not taking: Reported on 12/13/2022)    pantoprazole (PROTONIX) 40 MG tablet Take 1 tablet (40 mg total) by mouth once daily. (Patient not taking: Reported on 12/13/2022)       Potential issues to be addressed PRIOR TO DISCHARGE  Patient reported not taking the following medications: (amlodipine (Norvasc) 5 mg tab; hydrocortisone cream 2.5%; pantoprazole (Protonix) 40 mg tab). These medications remain on the home medication list. Please address accordingly.       Spoke with the nurse caring for patient @ [facility] and she was able to give last doses of medications.    Kathy Engle [facility].  [phone]                  .

## 2022-12-13 NOTE — ASSESSMENT & PLAN NOTE
Patient with Long standing persistent (>12 months) atrial fibrillation which is controlled currently with Beta Blocker and Flecainide. Patient is currently in atrial fibrillation.[id number] Score: 5.  Anticoagulation indicated. Anticoagulation done with Eliquis.    She is bradycardic- hold BB and flecainide  Resume home eliquis
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
## 2022-12-13 NOTE — ASSESSMENT & PLAN NOTE
Patient with Long standing persistent (>12 months) atrial fibrillation which is uncontrolled currently with Beta Blocker and Flecainide. Patient is currently in atrial fibrillation.[id number] Score: 5.  Anticoagulation indicated. Anticoagulation done with Eliquis in the past, currently not on anticoagulation.  Will need to rule out large CVA with MRI and discuss risks with family first..    Consult Cardiology due to bradycardia

## 2022-12-13 NOTE — ASSESSMENT & PLAN NOTE
Patient's FSGs are controlled on current medication regimen.  Last A1c reviewed-   Lab Results   Component Value Date    HGBA1C 5.9 08/30/2022     Most recent fingerstick glucose reviewed-   Recent Labs   Lab 12/13/22  0435   POCTGLUCOSE 282*     Current correctional scale  Low  Maintain anti-hyperglycemic dose as follows-   Antihyperglycemics (From admission, onward)    Start     Stop Route Frequency Ordered    12/13/22 0525  insulin aspart U-100 pen 0-5 Units         -- SubQ Every 6 hours PRN 12/13/22 0425        Hold Oral hypoglycemics while patient is in the hospital.

## 2022-12-13 NOTE — ASSESSMENT & PLAN NOTE
Her BNP is only 136  I suspect some of this is due to medications:   -Doubling of lasix and new ARB  Holding all anti-HTN meds for now  Will continue to bolus IVF until MAP >65  TTE ordered for am  Family wants full DNR (no central line or any pressors)  LA is in acceptable range at 1.7  Consider Albumin 25g iv x 1 if fails fluids    Her electrolytes don't speak to adrenal insufficiency (Na not low and K not high)  But will treat empirically with Dex 4mg iv q12 while ACTH stim test is pending  Her procal is normal, and no source of infection noted, so sepsis seems less likely as well  If all else fails and no improvement, consider empiric CT chest, abdomen and pelvis.   -On empiric Vanco and Rocephin

## 2022-12-13 NOTE — CONSULTS
"[facility] [place] - Intensive Care  Cardiology  Consult Note    Patient Name: Tierra Jennings  MRN: [id number]  Admission Date: 12/13/2022  Hospital Length of Stay: 0 days  Code Status: DNR   Attending Provider: Bernarda Dennison MD   Consulting Provider: Erika Joy MD  Primary Care Physician: Misael Jasmine MD  Principal Problem:Shock, unspecified    Patient information was obtained from patient and ER records.     Inpatient consult to Cardiology  Consult performed by: Erika Joy MD  Consult ordered by: GERALD Rizvi MD        Subjective:     Chief Complaint:  afib     HPI:   Patient unAble to give history.  History obtained from the chart.       Hypotension       Pt arrived via ems, pt chief complaint was Hypotension.  Pt was sent from [facility] for hypotension and AMS. Pt is usually a/ox1 and seems to be at baseline mental status. Pt bp with ems was 70/50's currently 110/56           HPI: Ms. Jennings is an 89yo lady with a past medical history of DM2, HTN, CVA, dementia, CAD with NSTEMI, and PAfib.  She has no TTE on file here or in Care Everywhere.     She was recently admitted to [facility] on 12/9/22 after being taken to [facility] by her family due to hallucinations and delusions of, "being kidnapped."  She had a PEC placed that same day.  Her son states she was hallucinating people who were not there, such as teenagers running around her house.       I am not sure what occurred at [place] today, but they did send a clinical sheet over stating, "BP 79/50, RR 14."  From her med list, it appears she had been started on only Risperdal 0.5mg po QHS.  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  She has continued on her Toprol XL and her Flecainide.        EKG demonstrates AFib at rate control.  No acute ischemic changes noted      Past Medical History:   Diagnosis Date    Diabetes mellitus, type 2     Heart murmur     Hypertension     Paroxysmal atrial fibrillation     Stroke        Past Surgical History:   Procedure Laterality Date    EYE SURGERY      TONSILLECTOMY      VEIN LIGATION AND STRIPPING         Review of patient's allergies indicates:   Allergen Reactions    Aspirin Other (See Comments)     Bleeding    Codeine     Erythromycin Rash       No current facility-administered medications on file prior to encounter.     Current Outpatient Medications on File Prior to Encounter   Medication Sig    blood sugar diagnostic (ONETOUCH ULTRA TEST) Strp 1 strip by miscellaneous route 2 times daily. DX code 250.00 (Patient taking differently: 1 strip by Misc.(Non-Drug; Combo Route) route 2 (two) times daily. 1 strip by miscellaneous route 2 times daily. DX code 250.00)    blood sugar diagnostic Strp To check BG 2 times daily, to use with insurance preferred meter    clotrimazole (LOTRIMIN) 1 % cream Apply 1 ampule topically 3 (three) times daily.    flecainide (TAMBOCOR) 50 MG Tab Take 1 tablet (50 mg total) by mouth every 12 (twelve) hours.    furosemide (LASIX) 40 MG tablet Take 40 mg by mouth once daily.    gabapentin (NEURONTIN) 100 MG capsule Take 1 capsule (100 mg total) by mouth once daily.    glipiZIDE (GLUCOTROL) 2.5 MG TR24 Take 1 tablet (2.5 mg total) by mouth daily with breakfast.    losartan (COZAAR) 100 MG tablet Take 100 mg by mouth once daily.    metFORMIN (GLUCOPHAGE-XR) 500 MG ER 24hr tablet Take 1 tablet (500 mg total) by mouth daily with breakfast.    metoprolol succinate (TOPROL-XL) 25 MG 24 hr tablet Take 0.5 tablets (12.5 mg total) by mouth once daily. (Patient taking differently: Take 25 mg by mouth once daily.)    mupirocin (BACTROBAN) 2 %  ointment Apply topically 3 (three) times daily.    potassium chloride SA (K-DUR,KLOR-CON M) 10 MEQ tablet Take 1 tablet (10 mEq total) by mouth once daily.    risperiDONE (RISPERDAL) 0.5 MG Tab Take 0.5 mg by mouth every evening.    zinc oxide 16 % Oint ointment Apply 1 ampule topically once daily.    amLODIPine (NORVASC) 5 MG tablet Take 1 tablet (5 mg total) by mouth nightly. (Patient not taking: Reported on 12/13/2022)    hydrocortisone 2.5 % cream Apply topically 2 (two) times daily. (Patient not taking: Reported on 12/13/2022)    pantoprazole (PROTONIX) 40 MG tablet Take 1 tablet (40 mg total) by mouth once daily. (Patient not taking: Reported on 12/13/2022)    [DISCONTINUED] apixaban (ELIQUIS) 5 mg Tab Take 1 tablet (5 mg total) by mouth 2 (two) times daily.    [DISCONTINUED] atorvastatin (LIPITOR) 40 MG tablet Take 1 tablet (40 mg total) by mouth nightly.    [DISCONTINUED] furosemide (LASIX) 40 MG tablet Take 0.5 tablets (20 mg total) by mouth once daily. (Patient taking differently: Take 40 mg by mouth once daily.)    [DISCONTINUED] hydrocortisone (ANUSOL-HC) 2.5 % rectal cream Place rectally 2 (two) times daily.    [DISCONTINUED] olmesartan (BENICAR) 40 MG tablet Take 1 tablet (40 mg total) by mouth once daily.     Family History       Problem Relation (Age of Onset)    Heart disease Mother          Tobacco Use    Smoking status: Never    Smokeless tobacco: Never   Substance and Sexual Activity    Alcohol use: No    Drug use: No    Sexual activity: Not Currently     Review of Systems   Unable to perform ROS: Dementia   Objective:     Vital Signs (Most Recent):  Temp: (!) 93.6 °F (34.2 °C) (12/13/22 0815)  Pulse: 70 (12/13/22 0930)  Resp: (!) 6 (12/13/22 0930)  BP: (!) 83/44 (12/13/22 0930)  SpO2: 99 % (12/13/22 0930)   Vital Signs (24h Range):  Temp:  [89 °F (31.7 °C)-98 °F (36.7 °C)] 93.6 °F (34.2 °C)  Pulse:  [42-75] 70  Resp:  [6-35] 6  SpO2:  [95 %-100 %] 99 %  BP: ()/(35-84) 83/44      Weight: 86.2 kg (190 lb)  Body mass index is 29.76 kg/m².    SpO2: 99 %         Intake/Output Summary (Last 24 hours) at 12/13/2022 1008  Last data filed at 12/13/2022 0900  Gross per 24 hour   Intake 2354.88 ml   Output 150 ml   Net 2204.88 ml       Lines/Drains/Airways       Drain  Duration                  Urethral Catheter 12/13/22 16 Fr. <1 day              Peripheral Intravenous Line  Duration                  Peripheral IV - Single Lumen 12/13/22 0226 20 G Distal;Left;Posterior Forearm <1 day         Peripheral IV - Single Lumen 12/13/22 0312 18 G Anterior;Right Forearm <1 day                    Physical Exam  HENT:      Head: Atraumatic.   Eyes:      Conjunctiva/sclera: Conjunctivae normal.   Cardiovascular:      Rate and Rhythm: Rhythm irregular.      Heart sounds: Murmur heard.   Pulmonary:      Effort: No respiratory distress.   Abdominal:      Palpations: Abdomen is soft.   Skin:     General: Skin is warm.   Neurological:      Mental Status: She is disoriented.       Significant Labs: BMP:   Recent Labs   Lab 12/13/22 0121 12/13/22  0545   * 295*    138   K 4.4 3.8    114*   CO2 20* 17*   BUN 33* 33*   CREATININE 1.7* 1.6*   CALCIUM 8.8 7.2*   MG 1.8 1.4*   , CMP   Recent Labs   Lab 12/13/22 0121 12/13/22  0545    138   K 4.4 3.8    114*   CO2 20* 17*   * 295*   BUN 33* 33*   CREATININE 1.7* 1.6*   CALCIUM 8.8 7.2*   PROT 7.2 5.1*   ALBUMIN 3.1* 2.2*   BILITOT 0.3 0.3   ALKPHOS 120 80   AST 22 17   ALT 15 12   ANIONGAP 13 7*   , CBC   Recent Labs   Lab 12/13/22 0121 12/13/22  0545   WBC 12.33 10.92   HGB 12.2 9.6*   HCT 36.8* 29.4*    228   , INR No results for input(s): INR, PROTIME in the last 48 hours., Lipid Panel No results for input(s): CHOL, HDL, LDLCALC, TRIG, CHOLHDL in the last 48 hours., Troponin   Recent Labs   Lab 12/13/22  0121 12/13/22  0545   TROPONINI 0.017 <0.006   , and All pertinent lab results from the last 24 hours have been  reviewed.    Significant Imaging: Echocardiogram: Transthoracic echo (TTE) complete (Cupid Only): No results found for this or any previous visit.    Assessment and Plan:     * Shock, unspecified  Check 2D echo.  Avoid antihypertensives.    DNR (do not resuscitate)        Bradycardia  Hold AV teresa blocking agents for now.  Titrate as needed    KIRSTIN (acute kidney injury)        Coronary artery disease involving native coronary artery of native heart without angina pectoris        History of CVA (cerebrovascular accident)        PAF (paroxysmal atrial fibrillation)        Type 2 diabetes mellitus without complication, without long-term current use of insulin            VTE Risk Mitigation (From admission, onward)         Ordered     heparin (porcine) injection 5,000 Units  Every 8 hours         12/13/22 0431     IP VTE HIGH RISK PATIENT  Once         12/13/22 0431     Place sequential compression device  Until discontinued         12/13/22 0431     Place LEONCIO hose  Until discontinued         12/13/22 0431                Thank you for your consult. I will follow-up with patient. Please contact us if you have any additional questions.    Erika Joy MD  Cardiology   [facility] [place] - Intensive Care      Critical Care Time:  45 minutes     Critical care was time spent personally by me on the following activities: development of treatment plan with patient or surrogate and bedside caregivers, discussions with consultants, evaluation of patient's response to treatment, examination of patient, ordering and performing treatments and interventions, ordering and review of laboratory studies, ordering and review of radiographic studies, pulse oximetry, re-evaluation of patient's condition. This critical care time did not overlap with that of any other provider or involve time for any procedures.

## 2022-12-13 NOTE — HPI
"Patient unAble to give history.  History obtained from the chart.       Hypotension       Pt arrived via ems, pt chief complaint was Hypotension.  Pt was sent from [facility] for hypotension and AMS. Pt is usually a/ox1 and seems to be at baseline mental status. Pt bp with ems was 70/50's currently 110/56           HPI: Ms. Jennings is an 89yo lady with a past medical history of DM2, HTN, CVA, dementia, CAD with NSTEMI, and PAfib.  She has no TTE on file here or in Care Everywhere.     She was recently admitted to [facility] on 12/9/22 after being taken to [facility] by her family due to hallucinations and delusions of, "being kidnapped."  She had a PEC placed that same day.  Her son states she was hallucinating people who were not there, such as teenagers running around her house.       I am not sure what occurred at [place] today, but they did send a clinical sheet over stating, "BP 79/50, RR 14."  From her med list, it appears she had been started on only Risperdal 0.5mg po QHS.  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  She has continued on her Toprol XL and her Flecainide.        EKG demonstrates AFib at rate control.  No acute ischemic changes noted  "

## 2022-12-13 NOTE — NURSING
[facility]  Nurses Note -- 4 Eyes      12/13/2022       Skin assessed on: Admit      [] No Pressure Injuries Present    []Prevention Measures Documented    [x] Yes LDA  for Pressure Injury Previously documented     [] Yes New Pressure Injury Discovered   [] LDA for New Pressure Injury Added      Attending RN:  Iva Miller RN     Second RN:  Bela MCMAHON

## 2022-12-13 NOTE — SUBJECTIVE & OBJECTIVE
Past Medical History:   Diagnosis Date    Diabetes mellitus, type 2     Heart murmur     Hypertension     Paroxysmal atrial fibrillation     Stroke      Past Surgical History:   Procedure Laterality Date    EYE SURGERY      TONSILLECTOMY      VEIN LIGATION AND STRIPPING       Review of patient's allergies indicates:   Allergen Reactions    Aspirin Other (See Comments)     Bleeding    Codeine     Erythromycin Rash     Medications:  Continuous Infusions:   sodium chloride 0.9% Stopped (12/13/22 0833)     Scheduled Meds:   albuterol-ipratropium  3 mL Nebulization Q6H    cefTRIAXone (ROCEPHIN) IVPB  2 g Intravenous Q24H    dexAMETHasone  4 mg Intravenous Q12H    heparin (porcine)  5,000 Units Subcutaneous Q8H    levothyroxine IVPB  25 mcg Intravenous Daily     PRN Meds:acetaminophen, albuterol-ipratropium, bisacodyL, dextrose 10%, dextrose 10%, glucagon (human recombinant), insulin aspart U-100, naloxone, ondansetron, prochlorperazine, sodium chloride 0.9%, Pharmacy to dose Vancomycin consult **AND** vancomycin - pharmacy to dose    Family History       Problem Relation (Age of Onset)    Heart disease Mother          Tobacco Use    Smoking status: Never    Smokeless tobacco: Never   Substance and Sexual Activity    Alcohol use: No    Drug use: No    Sexual activity: Not Currently     Review of Systems   Unable to perform ROS: Dementia   Objective:     Vital Signs (Most Recent):  Temp: (!) 93.6 °F (34.2 °C) (12/13/22 0815)  Pulse: 70 (12/13/22 0930)  Resp: (!) 6 (12/13/22 0930)  BP: (!) 83/44 (12/13/22 0930)  SpO2: 99 % (12/13/22 0930)   Vital Signs (24h Range):  Temp:  [89 °F (31.7 °C)-98 °F (36.7 °C)] 93.6 °F (34.2 °C)  Pulse:  [42-75] 70  Resp:  [6-35] 6  SpO2:  [95 %-100 %] 99 %  BP: ()/(35-84) 83/44     Weight: 86.2 kg (190 lb)  Body mass index is 29.76 kg/m².    Physical Exam  Constitutional:       General: She is not in acute distress.     Appearance: She is obese. She is ill-appearing.   HENT:      Head:  Normocephalic and atraumatic.      Nose: Nose normal.      Mouth/Throat:      Mouth: Mucous membranes are moist.   Cardiovascular:      Rate and Rhythm: Normal rate.   Pulmonary:      Effort: Pulmonary effort is normal. No respiratory distress.   Musculoskeletal:      Right lower leg: Edema present.      Left lower leg: Edema present.   Neurological:      Mental Status: She is lethargic, disoriented and confused.       Advance Care Planning   Advance Directives:   Living Will: Yes        Copy on chart: Yes    [place]POST: No    Do Not Resuscitate Status: Yes    Medical Power of : Yes        Oral Declaration: No    Agent's Name:  Isidro or Jody Jennings    Decision Making:  Family answered questions       Significant Labs: All pertinent labs within the past 24 hours have been reviewed.  CBC:   Recent Labs   Lab 12/13/22  0545   WBC 10.92   HGB 9.6*   HCT 29.4*   MCV 92        BMP:  Recent Labs   Lab 12/13/22  0545   *      K 3.8   *   CO2 17*   BUN 33*   CREATININE 1.6*   CALCIUM 7.2*   MG 1.4*     LFT:  Lab Results   Component Value Date    AST 17 12/13/2022    ALKPHOS 80 12/13/2022    BILITOT 0.3 12/13/2022     Albumin:   Albumin   Date Value Ref Range Status   12/13/2022 2.2 (L) 3.5 - 5.2 g/dL Final     Protein:   Total Protein   Date Value Ref Range Status   12/13/2022 5.1 (L) 6.0 - 8.4 g/dL Final     Lactic acid:   Lab Results   Component Value Date    LACTATE 1.6 12/13/2022    LACTATE 1.7 12/13/2022       Significant Imaging: I have reviewed all pertinent imaging results/findings within the past 24 hours.

## 2022-12-13 NOTE — ED NOTES
Received report from LISANDRO Daley. Pt lethargic, when asked name, unable to interpret what was said, primarily mumbles, pt able to follow commands, can answer certain questions. 97% on RA.

## 2022-12-13 NOTE — ASSESSMENT & PLAN NOTE
(2) Rx's placed in today's [employer] for delivery to [facility] home delivery.   See ACP note at admission.
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
## 2022-12-13 NOTE — ASSESSMENT & PLAN NOTE
Her TSH is mildly elevated though FT4 is normal   -Regardless, will try Synthroid 25 mcg iv qday  Warming blanket in place  Phenothiazine/atypical antipsychotics can induce poikilothermia, but low dose Risperdal 0.5mg HS seems unlikely

## 2022-12-13 NOTE — ASSESSMENT & PLAN NOTE
Patient with acute kidney injury likely due to IVVD/dehydration KIRSTIN is currently worsening. Labs reviewed- Renal function/electrolytes with Estimated Creatinine Clearance: 26.9 mL/min (A) (based on SCr of 1.6 mg/dL (H)). according to latest data. Monitor urine output and serial BMP and adjust therapy as needed. Avoid nephrotoxins and renally dose meds for GFR listed above.   - suspect this is due to shock and medication changes  - will review home med list with family  - TTE pending  - continue IVF  - BMP in AM

## 2022-12-13 NOTE — ASSESSMENT & PLAN NOTE
"Slow Afib on telemetry  Tried Glucogon 5mg iv given Toprol use - no effect  Suspect this is effect of BB + Flecainide  Family does not want Atropine, Dobutamine or Levophed ("code drugs")  - monitor     "

## 2022-12-13 NOTE — SUBJECTIVE & OBJECTIVE
Interval History: She awakens easily this morning. She says she has pain on her seat. She is oriented to self but not location, situation, or date.     Review of Systems   Respiratory:  Negative for shortness of breath.    Cardiovascular:  Negative for chest pain.   Gastrointestinal:  Negative for abdominal pain.   Psychiatric/Behavioral:  Positive for confusion.    Objective:     Vital Signs (Most Recent):  Temp: (!) 93.6 °F (34.2 °C) (12/13/22 0815)  Pulse: (!) 59 (12/13/22 1100)  Resp: 14 (12/13/22 1100)  BP: (!) 82/48 (12/13/22 1100)  SpO2: 97 % (12/13/22 1100)   Vital Signs (24h Range):  Temp:  [89 °F (31.7 °C)-98 °F (36.7 °C)] 93.6 °F (34.2 °C)  Pulse:  [42-75] 59  Resp:  [6-35] 14  SpO2:  [95 %-100 %] 97 %  BP: ()/(28-84) 82/48     Weight: 86.2 kg (190 lb)  Body mass index is 29.76 kg/m².    Intake/Output Summary (Last 24 hours) at 12/13/2022 1116  Last data filed at 12/13/2022 1100  Gross per 24 hour   Intake 2625.9 ml   Output 150 ml   Net 2475.9 ml      Physical Exam  Vitals and nursing note reviewed.   Constitutional:       General: She is not in acute distress.     Appearance: She is not ill-appearing or toxic-appearing.   HENT:      Head: Normocephalic and atraumatic.      Nose: Nose normal.      Mouth/Throat:      Mouth: Mucous membranes are moist.   Cardiovascular:      Rate and Rhythm: Bradycardia present. Rhythm irregular.      Pulses: Normal pulses.      Heart sounds: Normal heart sounds. No murmur heard.    No gallop.      Comments: Afib on monitor  Pulmonary:      Effort: Pulmonary effort is normal. No respiratory distress.      Breath sounds: Normal breath sounds. No wheezing or rales.      Comments: Room air  Abdominal:      General: Bowel sounds are normal. There is no distension.      Palpations: Abdomen is soft.      Tenderness: There is no abdominal tenderness. There is no guarding.   Genitourinary:     Comments: Causey in place  Musculoskeletal:      Right lower leg: No edema.       Left lower leg: No edema.   Skin:     General: Skin is warm and dry.   Neurological:      Mental Status: She is alert.      Comments: Oriented to self. Follows commands to squeeze with both hands       Significant Labs: All pertinent labs within the past 24 hours have been reviewed.    Significant Imaging: I have reviewed all pertinent imaging results/findings within the past 24 hours.

## 2022-12-13 NOTE — ASSESSMENT & PLAN NOTE
Patient with sudden onset of acute encephalopathy.  No need for psychiatric hospitalization as this is metabolic.  Stop PEC/CEC and 1:1 sitter.  Does not need inpatient psychiatric care after being medically cleared.  Encephalopathy likely due to multiple metabolic factors with concern for renal function and polypharmacy.  Stop all psychiatric meds and stop gabapentin.  Allow her to return to her baseline.  May be similar to incident a year ago when she was dehydrated.  MRI shows old strokes and this may be a manifestation of a vascular dementia process.  Continue reorient patient and utilize delirium precautions.

## 2022-12-13 NOTE — ASSESSMENT & PLAN NOTE
She has baseline dementia, but seems pretty high functioning per sons at normal baseline. Family reporting that she was living in dependently until 1 week ago when she developed worsening confusion and hallucinations, which prompted admission to [facility].   - currently oriented to self only  - suspect some of this is medication induced  - would like MRI brain when stable to do it  - Neurology consulted

## 2022-12-14 PROBLEM — E83.42 HYPOMAGNESEMIA: Status: ACTIVE | Noted: 2022-01-01

## 2022-12-14 NOTE — CONSULTS
1856: Consult was conducted with the patient and her medical provider. Medical Provider reported that the patient was visited by her two sons and daughter today. The patient presented as being disoriented, thinking she was going swimming.  Prayers were offered for the patient.  The Spiritual Care Team will continue to provide spiritual support to the patient.        LILY Connor   [phone]

## 2022-12-14 NOTE — PROVIDER TRANSFER
Ms Tierra Jennings is a 89 y.o. woman with Afib, CAD, DM who developed mental status changes 1 week ago with hallucinations. She was hospitalized at [facility] and multiple medications were changed. She was then admitted to ICU with shock. Suspect this may be in part due to medication changes. No signs of infection. Troponin negative. TTE normal EF. ACTH stim test normal. Dr Rizvi discussed code status and ICU care with family- DNR, no vasopressors, no central lines. She improved with IVF alone. However, she is still altered. This is newer onset with confusion and hallucinations over the last week which was the cause of her admission to [facility]. Neurology and Psychiatry consulted. CEC rescinded. Hypothermia has resolved. BP is improving. HR is improved but remains in Afib. PT, OT, Speech consulted.     To do  - monitor mental status- not back to baseline yet but improving   - resume BB when BP improved  - PT, OT, Speech consulted      Bernarda Dennison MD  12/14/2022  11:31 AM

## 2022-12-14 NOTE — PROGRESS NOTES
[facility] - Intensive Care  Neurology  Progress Note    Patient Name: Tierra Jennings  MRN: [id number]  Admission Date: 12/13/2022  Hospital Length of Stay: 1 days  Code Status: DNR   Attending Provider: Bernarda Dennison MD  Primary Care Physician: Misael Jasmine MD   Principal Problem:Shock, unspecified    Subjective:     Interval History: 89 y/o female with medical Hx of DM, HTN, PAF who comes to ED from behavioral facility due to AMS, hypotension. She was also found to be hypothermic. She was at [facility] due to hallucinations after an admission to another hospital for delusions and hallucinations. On arrival pt seemed to be sedated and poorly responsive. Today she is more aware of her surrounding but is nonsensical and seems to be having visual hallucinations.    -12/14/22: Pt still delirious.    Current Neurological Medications:     Current Facility-Administered Medications   Medication Dose Route Frequency Provider Last Rate Last Admin    0.9%  NaCl infusion   Intravenous Continuous WFarzad Rizvi  mL/hr at 12/14/22 0638 New Bag at 12/14/22 0638    acetaminophen suppository 650 mg  650 mg Rectal Q4H PRN W. Adam Rizvi MD        albuterol-ipratropium 2.5 mg-0.5 mg/3 mL nebulizer solution 3 mL  3 mL Nebulization Q4H PRN GERALD Rizvi MD        apixaban tablet 2.5 mg  2.5 mg Oral BID Bernarda Dennison MD   2.5 mg at 12/14/22 0825    bisacodyL suppository 10 mg  10 mg Rectal Daily PRN GERALD Rizvi MD        cefTRIAXone (ROCEPHIN) 2 g/50 mL D5W IVPB  2 g Intravenous Q24H WFarzad Rizvi MD 50 mL/hr at 12/14/22 0825 2 g at 12/14/22 0825    dextrose 10% bolus 125 mL  12.5 g Intravenous PRN GERALD Rizvi MD        dextrose 10% bolus 250 mL  25 g Intravenous PRN GERALD Rizvi MD        glucagon (human recombinant) injection 1 mg  1 mg Intramuscular PRN GERALD Rizvi MD        insulin aspart U-100 pen 0-5 Units  0-5 Units Subcutaneous Q6H PRN GERALD Rizvi MD         levothyroxine (SYNTHROID) 25 mcg in sodium chloride 0.9% SolP 100 mL IVPB  25 mcg Intravenous Daily GERALD Rizvi  mL/hr at 12/14/22 0827 25 mcg at 12/14/22 0827    mupirocin 2 % ointment   Nasal BID Bernarda Dennison MD   Given at 12/14/22 0825    naloxone 0.4 mg/mL injection 0.02 mg  0.02 mg Intravenous PRN GERALD Rizvi MD        ondansetron injection 4 mg  4 mg Intravenous Q8H PRN GERALD Rizvi MD        prochlorperazine injection Soln 5 mg  5 mg Intravenous Q6H PRN GERALD Rizvi MD        sodium chloride 0.9% flush 10 mL  10 mL Intravenous Q12H PRN GERALD Rizvi MD           Review of Systems   Unable to perform ROS: Mental status change   Objective:     Vital Signs (Most Recent):  Temp: 98.3 °F (36.8 °C) (12/14/22 0705)  Pulse: 104 (12/14/22 0800)  Resp: (!) 25 (12/14/22 0800)  BP: (!) 108/56 (12/14/22 0800)  SpO2: 97 % (12/14/22 0800)   Vital Signs (24h Range):  Temp:  [96 °F (35.6 °C)-98.3 °F (36.8 °C)] 98.3 °F (36.8 °C)  Pulse:  [] 104  Resp:  [12-28] 25  SpO2:  [91 %-100 %] 97 %  BP: ()/(45-64) 108/56     Weight: 80 kg (176 lb 5.9 oz)  Body mass index is 27.62 kg/m².    Physical Exam  Constitutional:       General: She is not in acute distress.  HENT:      Head: Normocephalic.   Eyes:      General:         Right eye: No discharge.         Left eye: No discharge.   Cardiovascular:      Rate and Rhythm: Normal rate.   Pulmonary:      Breath sounds: Normal breath sounds.   Abdominal:      Palpations: Abdomen is soft.   Musculoskeletal:         General: No swelling.      Cervical back: Neck supple.   Skin:     Findings: No rash.   Psychiatric:         Speech: Speech normal.         NEUROLOGICAL EXAMINATION:      MENTAL STATUS   Attention: decreased. Concentration: decreased.   Speech: speech is normal   Level of consciousness: alert     CRANIAL NERVES      CN III, IV, VI   Right pupil: Size: 3 mm. Shape: regular.   Left pupil: Size: 3 mm. Shape: regular.   Ophthalmoparesis:  none  Conjugate gaze: present     CN VII   Right facial weakness: none  Left facial weakness: none     CN XII   Tongue deviation: none     MOTOR EXAM        AROM of UE's and LE's against gravity        Significant Labs: CBC:   Recent Labs   Lab 12/13/22  0121 12/13/22  0545 12/14/22  0148   WBC 12.33 10.92 14.94*   HGB 12.2 9.6* 10.2*   HCT 36.8* 29.4* 30.4*    228 291     CMP:   Recent Labs   Lab 12/13/22  0121 12/13/22  0545 12/14/22  0148   * 295* 86    138 140   K 4.4 3.8 4.4    114* 116*   CO2 20* 17* 13*   BUN 33* 33* 33*   CREATININE 1.7* 1.6* 1.6*   CALCIUM 8.8 7.2* 7.7*   MG 1.8 1.4* 1.4*   PROT 7.2 5.1* 5.8*   ALBUMIN 3.1* 2.2* 2.6*   BILITOT 0.3 0.3 0.3   ALKPHOS 120 80 81   AST 22 17 22   ALT 15 12 14   ANIONGAP 13 7* 11       Significant Imaging: I have reviewed all pertinent imaging results/findings within the past 24 hours.    Assessment and Plan:     88 y/o female consulted for AMS     Encephalopathy: pt seems to be in a state of delirium. Perhaps pt was oversedated with antipsychotics as well while in behavioral facility. MRI brain shows no acute abnormalities but volume loss and multiple old lacunar infarcts. Pt may also have cognitive impairment.  Stop all potentially sedating meds.  See psych recs    Active Diagnoses:    Diagnosis Date Noted POA    PRINCIPAL PROBLEM:  Shock, unspecified [R57.9] 12/13/2022 Yes    Hypothermia [T68.XXXA] 12/13/2022 Yes    Bradycardia [R00.1] 12/13/2022 Yes    DNR (do not resuscitate) [Z66] 12/13/2022 Yes    Acute encephalopathy [G93.40] 12/13/2022 Yes    Coronary artery disease involving native coronary artery of native heart without angina pectoris [I25.10] 09/05/2021 Yes    KIRSTIN (acute kidney injury) [N17.9] 09/05/2021 Yes    Persistent atrial fibrillation [I48.19] 11/06/2020 Yes    History of CVA (cerebrovascular accident) [Z86.73] 11/06/2020 Not Applicable    Type 2 diabetes mellitus without complication, without long-term current use of  insulin [E11.9] 10/01/2013 Yes      Problems Resolved During this Admission:       VTE Risk Mitigation (From admission, onward)           Ordered     apixaban tablet 2.5 mg  2 times daily         12/13/22 1145     IP VTE HIGH RISK PATIENT  Once         12/13/22 0431     Place sequential compression device  Until discontinued         12/13/22 0431     Place LEONCIO hose  Until discontinued         12/13/22 0431                    Riccardo Granger MD  Neurology  [facility] - [place] - Intensive Care

## 2022-12-14 NOTE — ASSESSMENT & PLAN NOTE
Patient's FSGs are controlled on current medication regimen.  Last A1c reviewed-   Lab Results   Component Value Date    HGBA1C 5.8 (H) 12/13/2022     Most recent fingerstick glucose reviewed-   Recent Labs   Lab 12/13/22  1214 12/13/22  1801 12/14/22  0544   POCTGLUCOSE 119* 80 103     Current correctional scale  Low  Maintain anti-hyperglycemic dose as follows-   Antihyperglycemics (From admission, onward)    Start     Stop Route Frequency Ordered    12/13/22 0525  insulin aspart U-100 pen 0-5 Units         -- SubQ Every 6 hours PRN 12/13/22 1917

## 2022-12-14 NOTE — ASSESSMENT & PLAN NOTE
Her TSH is mildly elevated though FT4 is normal  ACTH stim test normal  Phenothiazine/atypical antipsychotics can induce poikilothermia-- suspect this was the cause  Now resolved.

## 2022-12-14 NOTE — PROGRESS NOTES
"[facility] Intensive Care  [facility] Medicine  Progress Note    Patient Name: Tierra Jennings  MRN: [id number]  Patient Class: IP- Inpatient   Admission Date: 12/13/2022  Length of Stay: 1 days  Attending Physician: Bernarda Dennison MD  Primary Care Provider: Misael Jasmine MD        Subjective:     Principal Problem:Shock, unspecified        HPI:  Ms. Jennings is an 88yo lady with a past medical history of DM2, HTN, CVA, dementia, CAD with NSTEMI, and PAfib.  She has no TTE on file here or in Care Everywhere.    She was recently admitted to [facility] on 12/9/22 after [facility] by her family due to hallucinations and delusions of, "being kidnapped."  She had a PEC placed that same day.  Her son states she was hallucinating people who were not there, such as teenagers running around her house.      I am not sure what occurred at [place] today, but they did send a clinical sheet over stating, "BP 79/50, RR 14."  From her med list, it appears she had been started on only Risperdal 0.5mg po QHS.  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  She has continued on her Toprol XL and her Flecainide.          Overview/Hospital Course:  Ms Tierra Jennings was admitted with shock. Suspect this may be in part due to medication changes. No signs of infection. Troponin negative. TTE normal EF. ACTH stim test normal. Dr Rizvi discussed code status and ICU care with family- DNR, no vasopressors, no central lines. She improved with IVF alone. However, she is still altered. This is newer onset with confusion and hallucinations over the last week which was the cause of her admission to [facility]. " Neurology and Psychiatry consulted. CEC rescinded. Hypothermia has resolved. BP is improving. HR is improved but remains in Afib. PT, OT, Speech consulted. Stepped down to floor.       Interval History: Awakens easily. Complains of pain on her foot. Says that she is in a psychiatric facility.     Review of Systems   Unable to perform ROS: Mental status change   Respiratory:  Negative for shortness of breath.    Cardiovascular:  Negative for chest pain.   Gastrointestinal:  Negative for abdominal pain.   Psychiatric/Behavioral:  Positive for confusion.    Objective:     Vital Signs (Most Recent):  Temp: 98.3 °F (36.8 °C) (12/14/22 0705)  Pulse: 104 (12/14/22 0800)  Resp: (!) 25 (12/14/22 0800)  BP: (!) 108/56 (12/14/22 0800)  SpO2: 97 % (12/14/22 0800)   Vital Signs (24h Range):  Temp:  [96 °F (35.6 °C)-98.3 °F (36.8 °C)] 98.3 °F (36.8 °C)  Pulse:  [] 104  Resp:  [12-28] 25  SpO2:  [91 %-100 %] 97 %  BP: ()/(45-64) 108/56     Weight: 80 kg (176 lb 5.9 oz)  Body mass index is 27.62 kg/m².    Intake/Output Summary (Last 24 hours) at 12/14/2022 1120  Last data filed at 12/14/2022 0600  Gross per 24 hour   Intake 2073.35 ml   Output 175 ml   Net 1898.35 ml        Physical Exam  Vitals and nursing note reviewed.   Constitutional:       General: She is not in acute distress.     Appearance: She is not ill-appearing or toxic-appearing.   HENT:      Head: Normocephalic and atraumatic.      Nose: Nose normal.      Mouth/Throat:      Mouth: Mucous membranes are moist.   Cardiovascular:      Rate and Rhythm: Tachycardia present. Rhythm irregular.      Pulses: Normal pulses.      Heart sounds: Normal heart sounds. No murmur heard.    No gallop.      Comments: Afib on monitor  Pulmonary:      Effort: Pulmonary effort is normal. No respiratory distress.      Breath sounds: Normal breath sounds. No wheezing or rales.      Comments: Room air  Abdominal:      General: Bowel sounds are normal. There is no distension.       Palpations: Abdomen is soft.      Tenderness: There is no abdominal tenderness. There is no guarding.   Musculoskeletal:      Right lower leg: No edema.      Left lower leg: No edema.   Skin:     General: Skin is warm and dry.   Neurological:      Mental Status: She is alert.      Comments: Awakens easily. Oriented to self and birthday. Not oriented to location or situation. Follows commands        Significant Labs: All pertinent labs within the past 24 hours have been reviewed.    Significant Imaging: I have reviewed all pertinent imaging results/findings within the past 24 hours.      Assessment/Plan:      * Shock, unspecified  Suspect this is due to medications. No evidence of sepsis currently. Does not appear volume overloaded. ACTH stim test negative. TTE normal.   - DC antibiotics  - DC IVF  - BP now improved  - monitor       Hypomagnesemia  Replace and monitor        Acute encephalopathy  She has baseline dementia, but seems pretty high functioning per sons at normal baseline. Family reporting that she was living in dependently until 1 week ago when she developed worsening confusion and hallucinations, which prompted admission to [facility].   - currently oriented to self and birthday.   - suspect some of this is medication induced  - MRI brain shows old strokes  - Psych consulted- hold all psych meds. Discontinued CEC.   - Neurology consulted  - PT, OT, Speech consulted     DNR (do not resuscitate)  See ACP note at admission.      Bradycardia  Slow Afib on telemetry on admit. This is now resolved.     Hypothermia  Her TSH is mildly elevated though FT4 is normal  ACTH stim test normal  Phenothiazine/atypical antipsychotics can induce poikilothermia-- suspect this was the cause  Now resolved.       KIRSTIN (acute kidney injury)  Patient with acute kidney injury likely due to IVVD/dehydration KIRSTIN is currently stable. Labs reviewed- Renal function/electrolytes with Estimated Creatinine Clearance: 26 mL/min (A) (based on  SCr of 1.6 mg/dL (H)). according to latest data. Monitor urine output and serial BMP and adjust therapy as needed. Avoid nephrotoxins and renally dose meds for GFR listed above.   - suspect this is due to shock and medication changes  - will DC IVF  - continue to hold ARB and lasix    Coronary artery disease involving native coronary artery of native heart without angina pectoris  Trop negative x2  Doubt ACS  TTE normal EF      History of CVA (cerebrovascular accident)  CTH no acute changes; however, she does have newer mental status changes with hallucinations. MRI brain no acute changes. Shows stable encephalomalacia L occipital lobe and remote L basal ganglia lacunar infarct.     Persistent atrial fibrillation  Patient with Long standing persistent (>12 months) atrial fibrillation which is controlled currently with Beta Blocker and Flecainide. Patient is currently in atrial fibrillation.[id number] Score: 5.  Anticoagulation indicated. Anticoagulation done with Eliquis.    HR now at times tachycardic. If BP remains stable, will start BB today   Resume home eliquis        Type 2 diabetes mellitus without complication, without long-term current use of insulin  Patient's FSGs are controlled on current medication regimen.  Last A1c reviewed-   Lab Results   Component Value Date    HGBA1C 5.8 (H) 12/13/2022     Most recent fingerstick glucose reviewed-   Recent Labs   Lab 12/13/22  1214 12/13/22  1801 12/14/22  0544   POCTGLUCOSE 119* 80 103     Current correctional scale  Low  Maintain anti-hyperglycemic dose as follows-   Antihyperglycemics (From admission, onward)      Start     Stop Route Frequency Ordered    12/13/22 0525  insulin aspart U-100 pen 0-5 Units         -- SubQ Every 6 hours PRN 12/13/22 0425              VTE Risk Mitigation (From admission, onward)           Ordered     apixaban tablet 2.5 mg  2 times daily         12/13/22 1145     IP VTE HIGH RISK PATIENT  Once         12/13/22 0431     Place  sequential compression device  Until discontinued         12/13/22 0431     Place LEONCIO hose  Until discontinued         12/13/22 0431                    Discharge Planning   JORGE:      Code Status: DNR   Is the patient medically ready for discharge?:     Reason for patient still in hospital (select all that apply): Patient trending condition  Discharge Plan A: Skilled Nursing Facility        4:16 PM Called morris Felix to update him. All questions answered.     Critical care time spent on the evaluation and treatment of severe organ dysfunction, review of pertinent labs and imaging studies, discussions with consulting providers and discussions with patient/family: 30 minutes.      Bernarda Dennison MD  Department of Hospital Medicine   [facility] [place] - Intensive Care

## 2022-12-14 NOTE — NURSING
[facility]  Nurses Note -- 4 Eyes      12/14/2022       Skin assessed on: Q Shift      [] No Pressure Injuries Present    []Prevention Measures Documented    [x] Yes LDA  for Pressure Injury Previously documented     [] Yes New Pressure Injury Discovered   [] LDA for New Pressure Injury Added      Attending RN:  Penny Hanson RN     Second RN:  LISANDRO Kc

## 2022-12-14 NOTE — ASSESSMENT & PLAN NOTE
Patient with acute kidney injury likely due to IVVD/dehydration KIRSTIN is currently stable. Labs reviewed- Renal function/electrolytes with Estimated Creatinine Clearance: 26 mL/min (A) (based on SCr of 1.6 mg/dL (H)). according to latest data. Monitor urine output and serial BMP and adjust therapy as needed. Avoid nephrotoxins and renally dose meds for GFR listed above.   - suspect this is due to shock and medication changes  - will DC IVF  - continue to hold ARB and lasix

## 2022-12-14 NOTE — ASSESSMENT & PLAN NOTE
Suspect this is due to medications. No evidence of sepsis currently. Does not appear volume overloaded. ACTH stim test negative. TTE normal.   - DC antibiotics  - DC IVF  - BP now improved  - monitor

## 2022-12-14 NOTE — CONSULTS
[facility] [place] Intensive Care  Wound Care    Patient Name:  Tierra Jennings   MRN:  [id number]  Date: 12/14/2022  Diagnosis: Shock, unspecified    History:     Past Medical History:   Diagnosis Date    Diabetes mellitus, type 2     Heart murmur     History of psychiatric hospitalization     Hypertension     Paroxysmal atrial fibrillation     Stroke        Social History     Socioeconomic History    Marital status:     Number of children: 5   Tobacco Use    Smoking status: Never    Smokeless tobacco: Never   Substance and Sexual Activity    Alcohol use: No    Drug use: No    Sexual activity: Not Currently       Precautions:     Allergies as of 12/13/2022 - Reviewed 12/13/2022   Allergen Reaction Noted    Aspirin Other (See Comments) 10/01/2013    Codeine  10/01/2013    Erythromycin Rash 10/01/2013       WO Assessment Details/Treatment   Consulted for altered skin integrity blanchable redness on sacrum  An 89 year old female admitted 12/13/22 from [facility] with shock, unspecified; bradycardia; hypothermia; DM II without complication; KIRSTIN; acute encephalopathy; paroxysmal atrial fibrillation; history CVA; DNR  12/14 WBC 14.94 Hgb 10.2 Hct 30.4 Alb 2.6 Weight 176 lb  12/13 A1C 5.8   On Isolibrium mattress; Tanvir score 12  At risk for development of pressure injury; blanchable redness not Stage 1 pressure injury  Nursing to continue Pressure Injury Prevention Interventions.  Reconsult [facility] nurse as needed.  Orders placed.     12/14/2022

## 2022-12-14 NOTE — SUBJECTIVE & OBJECTIVE
Interval History: Awakens easily. Complains of pain on her foot. Says that she is in a psychiatric facility.     Review of Systems   Unable to perform ROS: Mental status change   Respiratory:  Negative for shortness of breath.    Cardiovascular:  Negative for chest pain.   Gastrointestinal:  Negative for abdominal pain.   Psychiatric/Behavioral:  Positive for confusion.    Objective:     Vital Signs (Most Recent):  Temp: 98.3 °F (36.8 °C) (12/14/22 0705)  Pulse: 104 (12/14/22 0800)  Resp: (!) 25 (12/14/22 0800)  BP: (!) 108/56 (12/14/22 0800)  SpO2: 97 % (12/14/22 0800)   Vital Signs (24h Range):  Temp:  [96 °F (35.6 °C)-98.3 °F (36.8 °C)] 98.3 °F (36.8 °C)  Pulse:  [] 104  Resp:  [12-28] 25  SpO2:  [91 %-100 %] 97 %  BP: ()/(45-64) 108/56     Weight: 80 kg (176 lb 5.9 oz)  Body mass index is 27.62 kg/m².    Intake/Output Summary (Last 24 hours) at 12/14/2022 1120  Last data filed at 12/14/2022 0600  Gross per 24 hour   Intake 2073.35 ml   Output 175 ml   Net 1898.35 ml        Physical Exam  Vitals and nursing note reviewed.   Constitutional:       General: She is not in acute distress.     Appearance: She is not ill-appearing or toxic-appearing.   HENT:      Head: Normocephalic and atraumatic.      Nose: Nose normal.      Mouth/Throat:      Mouth: Mucous membranes are moist.   Cardiovascular:      Rate and Rhythm: Tachycardia present. Rhythm irregular.      Pulses: Normal pulses.      Heart sounds: Normal heart sounds. No murmur heard.    No gallop.      Comments: Afib on monitor  Pulmonary:      Effort: Pulmonary effort is normal. No respiratory distress.      Breath sounds: Normal breath sounds. No wheezing or rales.      Comments: Room air  Abdominal:      General: Bowel sounds are normal. There is no distension.      Palpations: Abdomen is soft.      Tenderness: There is no abdominal tenderness. There is no guarding.   Musculoskeletal:      Right lower leg: No edema.      Left lower leg: No edema.    Skin:     General: Skin is warm and dry.   Neurological:      Mental Status: She is alert.      Comments: Awakens easily. Oriented to self and birthday. Not oriented to location or situation. Follows commands        Significant Labs: All pertinent labs within the past 24 hours have been reviewed.    Significant Imaging: I have reviewed all pertinent imaging results/findings within the past 24 hours.

## 2022-12-14 NOTE — ASSESSMENT & PLAN NOTE
CTH no acute changes; however, she does have newer mental status changes with hallucinations. MRI brain no acute changes. Shows stable encephalomalacia L occipital lobe and remote L basal ganglia lacunar infarct.

## 2022-12-14 NOTE — PT/OT/SLP EVAL
Speech Language Pathology Evaluation  Bedside Swallow    Patient Name:  Tierra Jennings   MRN:  [id number]  Admitting Diagnosis: Shock, unspecified    Recommendations:                 General Recommendations:  Follow-up not indicated  Diet recommendations:  Mechanical soft, Thin   Aspiration Precautions: 1 bite/sip at a time   General Precautions: Standard,    Communication strategies:   calm tone    History:     Past Medical History:   Diagnosis Date    Diabetes mellitus, type 2     Heart murmur     History of psychiatric hospitalization     Hypertension     Paroxysmal atrial fibrillation     Stroke        Past Surgical History:   Procedure Laterality Date    EYE SURGERY      TONSILLECTOMY      VEIN LIGATION AND STRIPPING         Social History: Patient from [facility] previous had lived alone.     Chest X-Rays: 12/13 No significant detrimental change compared to prior study of 12/09/2022.    Prior diet: unrestricted    Subjective   Speech was tangential, Pt pleasantly confused easily following commands and able to answer some questions about her swallow ability. She reports she's thirsty.  Patient goals: initiate po    Pain/Comfort:  Pain Rating 1: 0/10    Respiratory Status: Room air    Objective:     Oral Musculature Evaluation  Oral Musculature: general weakness  Dentition: teeth in poor condition  Secretion Management: adequate  Mandibular Strength and Mobility: WFL  Oral Labial Strength and Mobility: WFL  Lingual Strength and Mobility: WFL  Voice Prior to PO Intake: wfl  Oral Musculature Comments: grossly symmetrical    Bedside Swallow Eval:   Consistencies Assessed:  Thin liquids ~5oz across multiple ST assisted straw sips  Puree x3  Solids x1      Oral Phase:   WFL  Trace-mild lingual residue post swallow of solid    Pharyngeal Phase:   no overt clinical signs/symptoms of aspiration    Compensatory Strategies  None    Treatment: please note silent aspiration cannot be r/o at bedside.      Assessment:     Tierra Jennings is a 89 y.o. female with dx of Shock, unspecified she presents with functional swallow.     Goals:   Multidisciplinary Problems       SLP Goals       Not on file              Multidisciplinary Problems (Resolved)          Problem: SLP    Goal Priority Disciplines Outcome   SLP Goal   (Resolved)    Low SLP Met   Description: Pt will participate in clinical swallow study (goal met 12/14)                       Plan:     Plan of Care expires:  12/14/22  Plan of Care reviewed with:  patient   SLP Follow-Up:  No       Discharge recommendations:  other (see comments) (no further ST is warranted)   Barriers to Discharge:  None    Time Tracking:     SLP Treatment Date:   12/14/22  Speech Start Time:  1017  Speech Stop Time:  1035     Speech Total Time (min):  18 min    Billable Minutes: Eval Swallow and Oral Function 18    12/14/2022

## 2022-12-14 NOTE — ASSESSMENT & PLAN NOTE
Patient with Long standing persistent (>12 months) atrial fibrillation which is controlled currently with Beta Blocker and Flecainide. Patient is currently in atrial fibrillation.[id number] Score: 5.  Anticoagulation indicated. Anticoagulation done with Eliquis.    HR now at times tachycardic. If BP remains stable, will start BB today   Resume home eliquis

## 2022-12-14 NOTE — ASSESSMENT & PLAN NOTE
She has baseline dementia, but seems pretty high functioning per sons at normal baseline. Family reporting that she was living in dependently until 1 week ago when she developed worsening confusion and hallucinations, which prompted admission to [facility].   - currently oriented to self and birthday.   - suspect some of this is medication induced  - MRI brain shows old strokes  - Psych consulted- hold all psych meds. Discontinued CEC.   - Neurology consulted  - PT, OT, Speech consulted

## 2022-12-14 NOTE — PLAN OF CARE
Patient remains in ICU. Able to answer all orientation questions but makes odd and rambling statements and having visual and and auditory hallucinations of people in the room. Did not sleep at all overnight and refused for lights to be dimmed or turned off. Frequent reorientation provided. Afib on cardiac monitor, mostly rate controlled. HR 70s-110s. BP stable. Room air. Luis Armando hugger in use, normothermia maintained. Pt with no UO since leslie removal on day shift. 115cc shown on bladder scan. eICU MD notified. Ordered in and out cath. 175cc urine yielded. Notified MD and 500cc NS bolus ordered. Purewick in place and pt instructed on use. Excoriation to groin and under breasts. Redness/shallow ulcer on sacrum. Cleanses and dried. Sacral foam in use. WC consult pending. Large purple bulges noted to vulva looking chronic in nature. No BM. No falls, injury, or new skin breakdown this shift. Plan of care reviewed with patient at bedside.

## 2022-12-14 NOTE — EICU
EICU Physician Brief Note - Overnight Events    Notified earlier of no UOP since Causey was removed on 12/13 day shift. Patient does not have urge to urinate. Straight cath performed with volume 175 mL (over probably at least 12 hours).  Plan:  NS bolus 500 mL over 2 hours ordered.  Continue monitoring I&Os.  Eicu is available should acute issues arise.

## 2022-12-15 PROBLEM — R00.1 BRADYCARDIA: Status: RESOLVED | Noted: 2022-01-01 | Resolved: 2022-01-01

## 2022-12-15 PROBLEM — T68.XXXA HYPOTHERMIA: Status: RESOLVED | Noted: 2022-01-01 | Resolved: 2022-01-01

## 2022-12-15 PROBLEM — N17.9 AKI (ACUTE KIDNEY INJURY): Status: RESOLVED | Noted: 2021-09-05 | Resolved: 2022-01-01

## 2022-12-15 NOTE — PLAN OF CARE
[facility] [place] Med Surg  Discharge Reassessment  Transferred from ICU to med surg, TN continued d/c planning for SNF Placement.  Denials Rosetta, Herjuan antonioge and Raely.    9:00 AM TN called in locet to state to [facility] [phone]. TN faxed completed pasrr to [phone]. TN awaiting [phone].    Plan A:  SNF  Plan B:  Home with family and home health.  Primary Care Provider: Misael Jasmine MD    Expected Discharge Date:     Reassessment (most recent)       Discharge Reassessment - 12/15/22 0807          Discharge Reassessment    Assessment Type Discharge Planning Reassessment     Did the patient's condition or plan change since previous assessment? Yes     Communicated JORGE with patient/caregiver Date not available/Unable to determine     Discharge Plan A Skilled Nursing Facility     Discharge Plan B Skilled Nursing Facility;Home with family;Home Health     DME Needed Upon Discharge  other (see comments)   TBD    Discharge Barriers Identified Other (see comments)     Why the patient remains in the hospital Requires continued medical care        Post-Acute Status    Coverage MEDICARE - MEDICARE PART A & B     Discharge Delays None known at this time

## 2022-12-15 NOTE — NURSING
[facility]  Nurses Note -- 4 Eyes      12/15/2022       Skin assessed on: Transfer      [] No Pressure Injuries Present    []Prevention Measures Documented    [x] Yes LDA  for Pressure Injury Previously documented   Maroon area to L heel and sacral redness    [x] Yes New Pressure Injury Discovered   [x] LDA for New Pressure Injury Added  Now with maroon on sacrum  Attending RN:  Penny Hanson RN     Second RN:  LISANDRO Skaggs and LISANDRO Cheng

## 2022-12-15 NOTE — PT/OT/SLP EVAL
Occupational Therapy   Evaluation    Name: Tierra Jennings  MRN: [id number]  Admitting Diagnosis: Shock, unspecified  Recent Surgery: * No surgery found *      Recommendations:     Discharge Recommendations: nursing facility, skilled  Discharge Equipment Recommendations:  bedside commode, bath bench  Barriers to discharge:   (Increased caregiver support.)    Assessment:     Tierra Jennings is a 89 y.o. female with a medical diagnosis of Shock, unspecified, and presents with AMS, postural instability, generalized weakness, able to make needs known, min cog assist beneficial. Performance deficits affecting function: weakness, impaired endurance, gait instability, impaired functional mobility, impaired cognition, decreased safety awareness, impaired coordination, decreased upper extremity function, decreased lower extremity function, impaired self care skills, impaired balance, edema, impaired skin, decreased ROM.      Rehab Prognosis: Fair(+); patient would benefit from acute skilled OT services to address these deficits and reach maximum level of function.       Plan:     Patient to be seen 3 x/week, 5 x/week to address the above listed problems via self-care/home management, therapeutic activities, therapeutic exercises  Plan of Care Expires: 12/23/22  Plan of Care Reviewed with: patient    Subjective     Chief Complaint: Patient unable to state, weakness/AMS at admit.   Patient/Family Comments/goals: Patient unable to state, no family present.     Occupational Profile:  Living Environment: Patient unable to report completely except resides with Son, alone daytime. Per chart Patient and Son share [facility], equipped with walk in shower w/ shower chair. Mod (I) mobility, (S) for baths. /friend provides cooking and home management. Son provides transportation for medical appointment.   Previous level of function: amb w/ (S) and R/w.   Equipment Used at Home: walker, rolling, wheelchair, shower  chair  Assistance upon Discharge: Son evenings, house keeper once every 2 weeks.    Pain/Comfort:  Pain Rating 1: 0/10    Patients cultural, spiritual, Voodoo conflicts given the current situation: no    Objective:     Communicated with: RN prior to session.  Patient found left sidelying UB/LEs curled upon self, linen entanglement with leslie catheter, peripheral IV, telemetry upon OT entry to room.    General Precautions: Standard, fall (AMS)  Orthopedic Precautions: N/A  Braces: N/A  Respiratory Status: Room air    Occupational Performance:    Bed Mobility:    Patient completed Rolling/Turning to Left with  maximal assistance and 2 persons  Patient completed Rolling/Turning to Right with maximal assistance and 1 persons  Patient completed Scooting/Bridging with maximal assistance and 2 persons  Patient completed Supine to Sit with  max <> total assistance of 2 persons  Patient completed Sit to Supine with maximal assistance and 2 persons    Functional Mobility/Transfers:  Patient completed Sit <> Stand Transfer with moderate assistance  with  rolling walker   Functional Mobility: staff of 2 present, V/T cues and r/w assist required for side steps. Mod cog assist also required.     Activities of Daily Living:  Feeding:  moderate assistance Patient demonstrating little interest in intake, trial halted.   Grooming: maximal assistance dry hair management, Patient unable to follow direction sufficiently for initiation of oral, care, mod a facial cleanse.   Upper Body Dressing: maximal assistance gown, ties, telemetry  Lower Body Dressing: maximal assistance non skid socks, total pressure boots.   Toileting: Leslie, BM NT, needs untimely      Cognitive/Visual Perceptual:  Cognitive/Psychosocial Skills:     -       Oriented to: Person (Place and situation loosely)  -       Follows Commands/attention:Easily distracted and Follows two-step commands  -       Communication: clear  -       Memory: Impaired STM, Impaired  LTM, Poor immediate recall, and recent AMS  -       Safety awareness/insight to disability: staff reliant   -       Mood/Affect/Coping skills/emotional control: Cooperative and Pleasant    Physical Exam:  Postural examination/scapula alignment:    -       Rounded shoulders  -       Forward head  -       Posterior pelvic tilt  -       Abnormal trunk flexion  -       Kyphosis  -       poor cervical control in unsupported sitting  Skin integrity: Bruising of BUEs < distally , Thin, and Dry  Sensation:  UT 2* cog deficits.   B Upper Extremity Range of Motion:  limited shoulder ROM < 55 degrees bilaterally, B elbow to digits WNL  Upper Extremity Strength: 3<3+/5   Strength: 3+/5  Fine Motor Coordination: WNL  Gross motor coordination: impaired, cog deficits contributory.     AMPAC 6 Click ADL:  AMPAC Total Score: 11    Treatment & Education:    Orienting interventions provided: all needs (ADL/leisure occupational) within reach, blinds opened, tv on low stress channel selection, lights on, and door left open.      EVAL completed collaboratively. Patient response (+)  *Education provided to Patient, re: role of OT, and OT Treatment rationales / protocols. Patient / indicating/verbalized understanding. Fullest info integration guarded  *Patient agreeable to this therapy session. Lights on / shade open.  SC:   *Basic self-care tasks and rudimentary functional mobility undertaken -- assist levels noted above.  *Safe swallow supported, with bed positioned on mid/High fowlers recommended/instructed to Patient / caregiver/s  TA:   Dyn sitting, Dyn standing reintroduction  Education provided regarding the importance of early/consistent mobility to maximize recovery in conjunction w/ edu related to importance of performing daily  UB/LB AROM exercises, all joints/all planes in sit as indicated for recovery of effective respiration in functional tasks, and to support effective circulation while in acute setting.  TE:   *Patient  engaged in [facility] for acute level ex program for: AA/AROM there ex to BUE 10 reps   2x 10 reps, all joints/all planes in sit as indicated for recovery of stamina, proper respiration in functional tasks, and to enhance joint capsule nourishment and ease of circulation while in current recovery setting.    Patient left HOB elevated Mid/High Sosa's with all lines intact, call button in hand, and RN notified.    GOALS:   Multidisciplinary Problems       Occupational Therapy Goals          Problem: Occupational Therapy    Goal Priority Disciplines Outcome Interventions   Occupational Therapy Goal     OT, PT/OT Ongoing, Progressing    Description: Goals to be met by: 12/15/22     Patient will increase functional independence with ADLs by performing:    Feeding with Supervision.  UE Dressing with Moderate Assistance.  LE Dressing with Moderate Assistance.  Grooming while seated with Minimal Assistance.  Toilet transfer to bedside commode with Minimal Assistance.  Upper extremity exercise program x10 reps per handout, with assistance as needed.                         History:     Past Medical History:   Diagnosis Date    Diabetes mellitus, type 2     Heart murmur     History of psychiatric hospitalization     Hypertension     Paroxysmal atrial fibrillation     Stroke          Past Surgical History:   Procedure Laterality Date    EYE SURGERY      TONSILLECTOMY      VEIN LIGATION AND STRIPPING         Time Tracking:     OT Date of Treatment: 12/15/22  OT Start Time: 1014  OT Stop Time: 1104  OT Total Time (min): 50 min    Billable Minutes:Evaluation 20 Eval partial overlap with PT for safest Patient OOB support.   Therapeutic Activity 15  Therapeutic Exercise 15    12/15/2022

## 2022-12-15 NOTE — NURSING
Patient arrived to floor from ICU. Tele box 1480 applied. Dti to left heel. Right heel soft with blanchable redness. Heel protectors boots on. Coccyx with areas of blanchable and non-blanchable redness with purple/maroon center. Foam dressing re-applied.     4 eyes done with LISANDRO Francis and LISANDRO Cheng.

## 2022-12-15 NOTE — ASSESSMENT & PLAN NOTE
Patient's FSGs are controlled on current medication regimen.  Last A1c reviewed-   Lab Results   Component Value Date    HGBA1C 5.8 (H) 12/13/2022     Most recent fingerstick glucose reviewed-   Recent Labs   Lab 12/14/22  1731 12/14/22  2233 12/15/22  0709 12/15/22  1157   POCTGLUCOSE 124* 104 88 100     Current correctional scale  Low  Maintain anti-hyperglycemic dose as follows-   Antihyperglycemics (From admission, onward)    Start     Stop Route Frequency Ordered    12/14/22 1230  insulin aspart U-100 pen 0-5 Units         -- SubQ Before meals & nightly PRN 12/14/22 1130

## 2022-12-15 NOTE — SUBJECTIVE & OBJECTIVE
"Interval History: slightly worsened    Family History       Problem Relation (Age of Onset)    Heart disease Mother          Tobacco Use    Smoking status: Never    Smokeless tobacco: Never   Substance and Sexual Activity    Alcohol use: No    Drug use: No    Sexual activity: Not Currently     Psychotherapeutics (From admission, onward)      None             Review of Systems   Unable to perform ROS: Mental status change   Objective:     Vital Signs (Most Recent):  Temp: 98.5 °F (36.9 °C) (12/15/22 1153)  Pulse: 80 (12/15/22 1153)  Resp: 18 (12/15/22 1153)  BP: 121/60 (12/15/22 1153)  SpO2: 98 % (12/15/22 1153) Vital Signs (24h Range):  Temp:  [97.3 °F (36.3 °C)-98.5 °F (36.9 °C)] 98.5 °F (36.9 °C)  Pulse:  [] 80  Resp:  [15-29] 18  SpO2:  [92 %-100 %] 98 %  BP: ()/(53-63) 121/60     Height: 5' 7" (170.2 cm)  Weight: 80 kg (176 lb 5.9 oz)  Body mass index is 27.62 kg/m².      Intake/Output Summary (Last 24 hours) at 12/15/2022 1348  Last data filed at 12/15/2022 0300  Gross per 24 hour   Intake 1088.54 ml   Output 550 ml   Net 538.54 ml       Physical Exam  Psychiatric:      Comments: EXAMINATION    CONSTITUTIONAL  General Appearance: hospital attire; sickly; weak    MUSCULOSKELETAL  Muscle Strength and Tone: normal  Abnormal Involuntary Movements: none noted  Gait and Station: not observed    PSYCHIATRIC MENTAL STATUS EXAM   Level of Consciousness: awake and alert  Orientation: first name only (worse than yesterday)  Grooming: limited  Psychomotor Behavior: slowed  Speech: soft; pressured  Language: no abnormalities  Mood: "good"  Affect: blunted  Thought Process: tangential  Associations: not fully intact  Thought Content: denies suicidal/homicidal/psychosis  Memory: confusion to recent and remote  Attention: mildly distracted  Fund of Knowledge: simple to conversation  Insight: limited into hospital care  Judgment: limited (worse) into cooperation with care            Significant Labs: All pertinent " labs within the past 24 hours have been reviewed.    Significant Imaging: I have reviewed all pertinent imaging results/findings within the past 24 hours.
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
## 2022-12-15 NOTE — PROGRESS NOTES
Follow Up    [facility], Left message for [facility] in admissions.    [phone], no beds, but left message.    [facility] [phone], requested referral be faxed. Submitted referral via [phone].    [facility] [phone], no answer, left message.

## 2022-12-15 NOTE — CONSULTS
Nursing reports sacral pressure injury is DTI instead of blanchable redness as reported 12/14  On Isoflex mattress wearing EHOB boots; positioned with foam wedge  Assessment:  Photodocumentation    Sacrum- DTI sacrum 5 cm(L) 1.5 cm(w) with dark red discoloration below DTI. Epidermis intact. Erythema surrounding DTI.     Left medial heel- DTI 2 cm(L) 3 cm(w) with epidermis intact with 1 cm erythema surrounding  Treatment:  Avoid pressure to left heel and sacral DTI. Continue local wound care with Mepilex foam dressing while DTI evolves.   Reconsult WOC nurse as needed.

## 2022-12-15 NOTE — EICU
Bladder scan with 450 ml and patient has not voided in 12 hours  Has poor oral intake due to altered mental status.  BP 96/54 on metoprolol.    Straight cath performed yesterday.    Plan:  NS 100ml per hour for 10 hours to allow increased urine volume to allow self voiding.  Check bladder scan in 2 hours and if greater than 700ml allow 60 minutes to void .  If unable to void will need leslie catheter placement.

## 2022-12-15 NOTE — ASSESSMENT & PLAN NOTE
Suspect this is due to medications. No evidence of sepsis currently. Does not appear volume overloaded. ACTH stim test negative. TTE normal.   - DC IVF  - BP now improved  - monitor

## 2022-12-15 NOTE — HOSPITAL COURSE
12/15/2022 - Patient was transferred to the medical floor for follow up.  She is interviewed in her room and is pleasantly confused.  She is only oriented to her name today (yesterday - place, city, year, name).  She has some tangential rambling.  Says that she couldn't sleep last night because of all of the noise going on.  She says that people were building things and knocking on the kumar.  She says that they were building something from the top down with a slide.  Then says that she saw an advertisement in which she would have to slide away from something, into some water.  She says that she is not able to physically do that and we shouldn't ask her to.  Nursing reports that she has refused her morning meds and was confused this morning.  I help set up her food tray and she is very confused as to how to eat it or where it came from.  Also some concern from urinary retention overnight.

## 2022-12-15 NOTE — PROGRESS NOTES
[facility]  Cardiology  Progress Note    Patient Name: Tierra Jennings  MRN: [id number]  Admission Date: 12/13/2022  Hospital Length of Stay: 1 days  Code Status: DNR   Attending Physician: Jw Perez MD   Primary Care Physician: Misael Jasmine MD  Expected Discharge Date:   Principal Problem:Shock, unspecified    Subjective:       Interval History:  No cardiac complaints.  Heart rate better.    Review of Systems   Unable to perform ROS: Dementia   Objective:     Vital Signs (Most Recent):  Temp: 98.3 °F (36.8 °C) (12/14/22 0705)  Pulse: 83 (12/14/22 1700)  Resp: 16 (12/14/22 1700)  BP: (!) 105/58 (12/14/22 1700)  SpO2: 97 % (12/14/22 1700)   Vital Signs (24h Range):  Temp:  [97.6 °F (36.4 °C)-98.3 °F (36.8 °C)] 98.3 °F (36.8 °C)  Pulse:  [] 83  Resp:  [16-29] 16  SpO2:  [90 %-100 %] 97 %  BP: ()/(50-64) 105/58     Weight: 80 kg (176 lb 5.9 oz)  Body mass index is 27.62 kg/m².     SpO2: 97 %         Intake/Output Summary (Last 24 hours) at 12/14/2022 1832  Last data filed at 12/14/2022 0600  Gross per 24 hour   Intake 1490.09 ml   Output 175 ml   Net 1315.09 ml       Lines/Drains/Airways       Drain  Duration             Female External Urinary Catheter 12/13/22 1330 1 day              Peripheral Intravenous Line  Duration                  Peripheral IV - Single Lumen 12/13/22 0226 20 G Distal;Left;Posterior Forearm 1 day         Peripheral IV - Single Lumen 12/13/22 0312 18 G Anterior;Right Forearm 1 day         Peripheral IV - Single Lumen 12/13/22 Right Antecubital 1 day                    Physical Exam  HENT:      Head: Atraumatic.   Eyes:      Conjunctiva/sclera: Conjunctivae normal.   Cardiovascular:      Rate and Rhythm: Rhythm irregular.      Heart sounds: Murmur heard.   Pulmonary:      Effort: No respiratory distress.   Abdominal:      Palpations: Abdomen is soft.   Skin:     General: Skin is warm.   Neurological:      Mental Status: She is disoriented.        Significant Labs: BMP:   Recent Labs   Lab 12/13/22  0121 12/13/22  0545 12/14/22  0148   * 295* 86    138 140   K 4.4 3.8 4.4    114* 116*   CO2 20* 17* 13*   BUN 33* 33* 33*   CREATININE 1.7* 1.6* 1.6*   CALCIUM 8.8 7.2* 7.7*   MG 1.8 1.4* 1.4*   , CMP   Recent Labs   Lab 12/13/22  0121 12/13/22  0545 12/14/22  0148    138 140   K 4.4 3.8 4.4    114* 116*   CO2 20* 17* 13*   * 295* 86   BUN 33* 33* 33*   CREATININE 1.7* 1.6* 1.6*   CALCIUM 8.8 7.2* 7.7*   PROT 7.2 5.1* 5.8*   ALBUMIN 3.1* 2.2* 2.6*   BILITOT 0.3 0.3 0.3   ALKPHOS 120 80 81   AST 22 17 22   ALT 15 12 14   ANIONGAP 13 7* 11   , CBC   Recent Labs   Lab 12/13/22  0121 12/13/22  0545 12/14/22  0148   WBC 12.33 10.92 14.94*   HGB 12.2 9.6* 10.2*   HCT 36.8* 29.4* 30.4*    228 291   , INR No results for input(s): INR, PROTIME in the last 48 hours., Lipid Panel No results for input(s): CHOL, HDL, LDLCALC, TRIG, CHOLHDL in the last 48 hours., Troponin   Recent Labs   Lab 12/13/22  0121 12/13/22 0545   TROPONINI 0.017 <0.006   , and All pertinent lab results from the last 24 hours have been reviewed.    Significant Imaging: Echocardiogram: Transthoracic echo (TTE) complete (Cupid Only):   Results for orders placed or performed during the hospital encounter of 12/13/22   Echo Saline Bubble? No   Result Value Ref Range    BSA 2.02 m2    TDI SEPTAL 0.06 m/s    LV LATERAL E/E' RATIO 5.40 m/s    LV SEPTAL E/E' RATIO 9.00 m/s    LA WIDTH 4.70 cm    IVC diameter 1.79 cm    Left Ventricular Outflow Tract Mean Velocity 0.48 cm/s    Left Ventricular Outflow Tract Mean Gradient 1.01 mmHg    TDI LATERAL 0.10 m/s    PV PEAK VELOCITY 0.78 cm/s    LVIDd 3.39 (A) 3.5 - 6.0 cm    IVS 1.41 (A) 0.6 - 1.1 cm    Posterior Wall 1.45 (A) 0.6 - 1.1 cm    LVIDs 2.43 2.1 - 4.0 cm    FS 28 28 - 44 %    LA volume 107.27 cm3    Sinus 3.01 cm    STJ 2.41 cm    Ascending aorta 3.08 cm    LV mass 171.31 g    LA size 4.79 cm    RVDD 4.25  cm    TAPSE 1.59 cm    Left Ventricle Relative Wall Thickness 0.86 cm    AV mean gradient 4 mmHg    AV valve area 2.68 cm2    AV Velocity Ratio 0.52     AV index (prosthetic) 0.63     Mean e' 0.08 m/s    IVRT 111.32 msec    LVOT diameter 2.32 cm    LVOT area 4.2 cm2    LVOT peak duy 0.68 m/s    LVOT peak VTI 18.50 cm    Ao peak duy 1.30 m/s    Ao VTI 29.2 cm    LVOT stroke volume 78.17 cm3    AV peak gradient 7 mmHg    TV peak gradient 1.54 mmHg    E/E' ratio 6.75 m/s    MV Peak E Duy 0.54 m/s    TR Max Duy 2.65 m/s    LV Systolic Volume 20.88 mL    LV Systolic Volume Index 10.9 mL/m2    LV Diastolic Volume 47.18 mL    LV Diastolic Volume Index 24.57 mL/m2    LA Volume Index 55.9 mL/m2    LV Mass Index 89 g/m2    RA Major Axis 5.87 cm    Left Atrium Minor Axis 4.72 cm    Left Atrium Major Axis 6.90 cm    Triscuspid Valve Regurgitation Peak Gradient 28 mmHg    RA Width 5.11 cm    Right Atrial Pressure (from IVC) 8 mmHg    EF 55 %    TV rest pulmonary artery pressure 36 mmHg    Narrative    · The estimated ejection fraction is 55%.  · The left ventricle is normal in size with normal systolic function.  · Severe left atrial enlargement.  · Indeterminate left ventricular diastolic function.  · Mild right ventricular enlargement.  · Severe right atrial enlargement.  · Mild tricuspid regurgitation.  · Mild mitral regurgitation.  · Intermediate central venous pressure (8 mmHg).  · The estimated PA systolic pressure is 36 mmHg.        Assessment and Plan:     Brief HPI:     * Shock, unspecified  Resolved    DNR (do not resuscitate)        Bradycardia  Resolved    KIRSTIN (acute kidney injury)        Coronary artery disease involving native coronary artery of native heart without angina pectoris        History of CVA (cerebrovascular accident)        Persistent atrial fibrillation  On Eliquis.  Rate controlled.      Type 2 diabetes mellitus without complication, without long-term current use of insulin            VTE Risk  Mitigation (From admission, onward)         Ordered     apixaban tablet 2.5 mg  2 times daily         12/13/22 1145     IP VTE HIGH RISK PATIENT  Once         12/13/22 0431     Place sequential compression device  Until discontinued         12/13/22 0431     Place LEONCIO hose  Until discontinued         12/13/22 0431                Erika Joy MD  Cardiology  [facility] - Intensive Care    Critical Care Time:  35 minutes     Critical care was time spent personally by me on the following activities: development of treatment plan with patient or surrogate and bedside caregivers, discussions with consultants, evaluation of patient's response to treatment, examination of patient, ordering and performing treatments and interventions, ordering and review of laboratory studies, ordering and review of radiographic studies, pulse oximetry, re-evaluation of patient's condition. This critical care time did not overlap with that of any other provider or involve time for any procedures.

## 2022-12-15 NOTE — EICU
Causey catheter placed for urinary retention.  UA ordered as urine with sediments and foul smelling.

## 2022-12-15 NOTE — PLAN OF CARE
Problem: Physical Therapy  Goal: Physical Therapy Goal  Description: Goals to be met by: 22     Patient will increase functional independence with mobility by performin. Supine to sit with Stand-by Assistance  2. Rolling to Left and Right with Stand-by Assistance.  3. Bed to chair transfer with Contact Guard Assistance    4. Wheelchair propulsion x25 feet with Stand-by Assistance   5. Gait with RW and Min A x 10-15'  6. Lower extremity exercise program x10 reps per handout, with supervision    Outcome: Ongoing, Progressing   Initial PT evaluation performed today.  Pt could benefit from skilled PT services 3-5x/wk in order to maximize function prior to D/C.  SNF recommended as pt is not functioning at baseline and is at increased risk for falls, readmission, and morbidity if she returns home at present time.

## 2022-12-15 NOTE — PT/OT/SLP EVAL
Physical Therapy Evaluation    Patient Name:  Tierra Jennings   MRN:  [id number]    Recommendations:     Discharge Recommendations: nursing facility, skilled   Discharge Equipment Recommendations:  (Per SNF)   Barriers to discharge:  Pt is not functioning at prior level and is at increased risk for falls, readmission, and morbidity if she returns home at present time     Assessment:     Tierra Jennings is a 89 y.o. female admitted with a medical diagnosis of Shock, unspecified.  She presents with the following impairments/functional limitations: weakness, gait instability, decreased upper extremity function, impaired endurance, impaired balance, impaired coordination, decreased safety awareness, impaired cognition, impaired self care skills, impaired functional mobility, decreased coordination, edema .    Rehab Prognosis: Fair; patient would benefit from acute skilled PT services to address these deficits and reach maximum level of function.    Recent Surgery: * No surgery found *      Plan:     During this hospitalization, patient to be seen  (3-5x/wk) to address the identified rehab impairments via gait training, therapeutic activities, therapeutic exercises, neuromuscular re-education, wheelchair management/training and progress toward the following goals:    Plan of Care Expires:  12/29/22    Subjective     Chief Complaint: fear of falling, needs her shoes to stand up  Patient/Family Comments/goals: Pt confused, cooperative with education and encouragement  Pain/Comfort:  Pain Rating 1: 0/10    Patients cultural, spiritual, Restoration conflicts given the current situation: no    Living Environment:  Pt lives with her son, admitted from [facility]  Prior to admission, patients level of function was Unknown.  Pt states that she was ambulating with RW  Equipment used at home: walker, rolling, wheelchair, shower chair.  DME owned (not currently used):  unknown .  Upon discharge, patient will  have assistance from Son.    Objective:     Communicated with nsg prior to session.  Patient found HOB elevated with leslie catheter, telemetry, peripheral IV, bed alarm  upon PT entry to room.    General Precautions: Standard, fall (skin breakdown)  Orthopedic Precautions:N/A   Braces: N/A  Respiratory Status: Room air    Exams:  Cognitive Exam:  Patient is oriented to Person, able to follow commands, speaking word salad at times then perfectly clear when speaking of the past at times.    Gross Motor Coordination:  impaired 2/2 weakness, deconditioning, fear  Postural Exam:  Patient presented with the following abnormalities:    -       Rounded shoulders  -       Forward head  -       pendulus abdomen  Sensation:    -       Intact  light/touch B LE's  Skin Integrity/Edema:      -       Skin integrity: Visible skin intact  -       Edema: Moderate LE's  RLE ROM: WFL  RLE Strength: WFL  LLE ROM: WFL  LLE Strength: WFL    Functional Mobility:  Bed Mobility:     Rolling Left:  total assistance and of 2 persons with Vc/Tc for reaching for BR and logrolling  Scooting: total assistance and of 2 persons with VC/TC for hand placement and forward weight bearing over AURA to EOB in sitting and Total A x 2 persons to HOB in supine with VC/TC for bridging  Supine to Sit: total assistance and of 2 persons with VC/TC for hand placement and body mechanics with HOB elevated  Sit to Supine: total assistance and of 2 persons with Vc/TC for hand placement and body mechanics  Transfers:     Sit to Stand:  maximal assistance and of 2 persons with rolling walker  Gait: Gait training with RW and Max A x 2 persons with max Vc/TC for sequencing, distribution of weight through UE's to walker, and weight shifting x 3 sidesteps    Balance: Poor>Fair+ sit at EOB , poor stand with posterior lean      AM-PAC 6 CLICK MOBILITY  Total Score:10       Treatment & Education:  Pt sat at EOB and performed B FAQ's x 10 reps ea with Min A for balance and  VC/TC for forward weight shift over AURA.  Educated pt to perform B AP's and TKE's while in bed.  Pt verbalized/demonstrated understanding with VC/TC to perform correctly.  Pt dependently repositioned to HOB with Pillow/wedge on R side and pressure relief boots donned    Patient left  as above  with all lines intact, call button in reach, bed alarm on, and nsg notified.    GOALS:   Multidisciplinary Problems       Physical Therapy Goals          Problem: Physical Therapy    Goal Priority Disciplines Outcome Goal Variances Interventions   Physical Therapy Goal     PT, PT/OT Ongoing, Progressing     Description: Goals to be met by: 22     Patient will increase functional independence with mobility by performin. Supine to sit with Stand-by Assistance  2. Rolling to Left and Right with Stand-by Assistance.  3. Bed to chair transfer with Contact Guard Assistance    4. Wheelchair propulsion x25 feet with Stand-by Assistance   5. Gait with RW and Min A x 10-15'  6. Lower extremity exercise program x10 reps per handout, with supervision                         History:     Past Medical History:   Diagnosis Date    Diabetes mellitus, type 2     Heart murmur     History of psychiatric hospitalization     Hypertension     Paroxysmal atrial fibrillation     Stroke        Past Surgical History:   Procedure Laterality Date    EYE SURGERY      TONSILLECTOMY      VEIN LIGATION AND STRIPPING         Time Tracking:     PT Received On: 12/15/22  PT Start Time: 1040     PT Stop Time: 1103  PT Total Time (min): 23 min     Billable Minutes: Evaluation 15 and Therapeutic Exercise 8 co-evaluation with OT      12/15/2022

## 2022-12-15 NOTE — PROGRESS NOTES
"[facility] Medicine  Progress Note    Patient Name: Tierra Jennings  MRN: [id number]  Patient Class: IP- Inpatient   Admission Date: 12/13/2022  Length of Stay: 2 days  Attending Physician: Jw Perez MD  Primary Care Provider: Misael Jasmine MD        Subjective:     Principal Problem:Shock, unspecified        HPI:  Ms. Jennings is an 89yo lady with a past medical history of DM2, HTN, CVA, dementia, CAD with NSTEMI, and PAfib.  She has no TTE on file here or in Care Everywhere.    She was recently admitted to [facility] on 12/9/22 after being taken to [facility] by her family due to hallucinations and delusions of, "being kidnapped."  She had a PEC placed that same day.  Her son states she was hallucinating people who were not there, such as teenagers running around her house.      I am not sure what occurred at [place] today, but they did send a clinical sheet over stating, "BP 79/50, RR 14."  From her med list, it appears she had been started on only Risperdal 0.5mg po QHS.  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  She has continued on her Toprol XL and her Flecainide.          Overview/Hospital Course:  Ms Tierra Jennings was admitted with shock. Suspect this may be in part due to medication changes. No signs of infection. Troponin negative. TTE normal EF. ACTH stim test normal. Dr Rizvi discussed code status and ICU care with family- DNR, no vasopressors, no central lines. She improved with IVF alone. However, she is still altered. This is newer onset with confusion and hallucinations over the last week which was the cause of her admission to [facility]. " Neurology and Psychiatry consulted. CEC rescinded. Hypothermia has resolved. BP is improving. HR is improved but remains in Afib. PT, OT, Speech consulted. Stepped down to floor.         Interval History: states she did not sleep much last night.  Episodes of confusion per nursing.    Review of Systems   HENT:  Negative for ear discharge and ear pain.    Eyes:  Negative for discharge and itching.   Endocrine: Negative for cold intolerance and heat intolerance.   Neurological:  Negative for seizures and syncope.   Objective:     Vital Signs (Most Recent):  Temp: 98.5 °F (36.9 °C) (12/15/22 1153)  Pulse: 80 (12/15/22 1153)  Resp: 18 (12/15/22 1153)  BP: 121/60 (12/15/22 1153)  SpO2: 98 % (12/15/22 1153) Vital Signs (24h Range):  Temp:  [97.3 °F (36.3 °C)-98.5 °F (36.9 °C)] 98.5 °F (36.9 °C)  Pulse:  [] 80  Resp:  [15-29] 18  SpO2:  [92 %-100 %] 98 %  BP: ()/(53-63) 121/60     Weight: 80 kg (176 lb 5.9 oz)  Body mass index is 27.62 kg/m².    Intake/Output Summary (Last 24 hours) at 12/15/2022 1351  Last data filed at 12/15/2022 0300  Gross per 24 hour   Intake 1088.54 ml   Output 550 ml   Net 538.54 ml      Physical Exam  Vitals and nursing note reviewed.   Constitutional:       General: She is not in acute distress.     Appearance: She is not ill-appearing or toxic-appearing.   HENT:      Head: Normocephalic and atraumatic.      Nose: Nose normal.      Mouth/Throat:      Mouth: Mucous membranes are moist.   Cardiovascular:      Rate and Rhythm: Normal rate. Rhythm irregular.      Pulses: Normal pulses.      Heart sounds: Normal heart sounds. No murmur heard.    No gallop.   Pulmonary:      Effort: Pulmonary effort is normal. No respiratory distress.      Breath sounds: Normal breath sounds. No wheezing or rales.   Abdominal:      General: Bowel sounds are normal. There is no distension.      Palpations: Abdomen is soft.      Tenderness: There is no abdominal tenderness. There is no guarding.    Musculoskeletal:      Right lower leg: No edema.      Left lower leg: No edema.   Skin:     General: Skin is warm and dry.   Neurological:      Mental Status: She is alert.      Comments: Awake and alert.  Oriented to person and place, but confused.   Psychiatric:         Speech: Speech is tangential.         Behavior: Behavior is slowed.         Cognition and Memory: Cognition is impaired.       Significant Labs: All pertinent labs within the past 24 hours have been reviewed.  BMP:   Recent Labs   Lab 12/15/22  0521   GLU 99      K 4.5   *   CO2 17*   BUN 28*   CREATININE 1.1   CALCIUM 8.2*   MG 2.1     CBC:   Recent Labs   Lab 12/14/22  0148 12/15/22  0521   WBC 14.94* 11.50   HGB 10.2* 10.8*   HCT 30.4* 33.7*    298       Significant Imaging: I have reviewed all pertinent imaging results/findings within the past 24 hours.      Assessment/Plan:      * Shock, unspecified  Suspect this is due to medications. No evidence of sepsis currently. Does not appear volume overloaded. ACTH stim test negative. TTE normal.   - DC IVF  - BP now improved  - monitor       Acute encephalopathy  She has baseline dementia, but seems pretty high functioning per sons at normal baseline. Family reporting that she was living in dependently until 1 week ago when she developed worsening confusion and hallucinations, which prompted admission to [facility].   - currently waxing and waning delirium.   - suspect some of this is medication induced, now worsened by hospitalization.  - MRI brain shows old strokes  - Psych consulted- hold all psych meds. Discontinued CEC.   - Neurology consulted  - PT, OT, Speech consulted   Repeat UA is slightly abnormal.  Doubt contributing, but start Rocephin pending cultures.    Hypomagnesemia  Replace as needed.        DNR (do not resuscitate)  See ACP note at admission.      Coronary artery disease involving native coronary artery of native heart without angina pectoris  Trop negative  x2  Doubt ACS  TTE normal EF      History of CVA (cerebrovascular accident)  CTH no acute changes; however, she does have newer mental status changes with hallucinations. MRI brain no acute changes. Shows stable encephalomalacia L occipital lobe and remote L basal ganglia lacunar infarct.     Persistent atrial fibrillation  Patient with Long standing persistent (>12 months) atrial fibrillation which is controlled currently with Beta Blocker and Flecainide. Patient is currently in atrial fibrillation.[id number] Score: 5.  Anticoagulation indicated. Anticoagulation done with Eliquis.  HR now at times tachycardic. Restarted B blocker.  Resume home eliquis        Type 2 diabetes mellitus without complication, without long-term current use of insulin  Patient's FSGs are controlled on current medication regimen.  Last A1c reviewed-   Lab Results   Component Value Date    HGBA1C 5.8 (H) 12/13/2022     Most recent fingerstick glucose reviewed-   Recent Labs   Lab 12/14/22  1731 12/14/22  2233 12/15/22  0709 12/15/22  1157   POCTGLUCOSE 124* 104 88 100     Current correctional scale  Low  Maintain anti-hyperglycemic dose as follows-   Antihyperglycemics (From admission, onward)    Start     Stop Route Frequency Ordered    12/14/22 1230  insulin aspart U-100 pen 0-5 Units         -- SubQ Before meals & nightly PRN 12/14/22 1130            VTE Risk Mitigation (From admission, onward)         Ordered     apixaban tablet 2.5 mg  2 times daily         12/13/22 1145     IP VTE HIGH RISK PATIENT  Once         12/13/22 0431     Place sequential compression device  Until discontinued         12/13/22 0431     Place LEONCIO hose  Until discontinued         12/13/22 0431                Discharge Planning   JORGE:      Code Status: DNR   Is the patient medically ready for discharge?:     Reason for patient still in hospital (select all that apply): Patient trending condition  Discharge Plan A: Skilled Nursing Facility   Discharge Delays: None  known at this time              Jw Perez MD  Department of Hospital Medicine   [facility]

## 2022-12-15 NOTE — ASSESSMENT & PLAN NOTE
She has baseline dementia, but seems pretty high functioning per sons at normal baseline. Family reporting that she was living in dependently until 1 week ago when she developed worsening confusion and hallucinations, which prompted admission to [facility].   - currently waxing and waning delirium.   - suspect some of this is medication induced, now worsened by hospitalization.  - MRI brain shows old strokes  - Psych consulted- hold all psych meds. Discontinued CEC.   - Neurology consulted  - PT, OT, Speech consulted   Repeat UA is slightly abnormal.  Doubt contributing, but start Rocephin pending cultures.

## 2022-12-15 NOTE — PLAN OF CARE
Problem: Occupational Therapy  Goal: Occupational Therapy Goal  Description: Goals to be met by: 12/15/22     Patient will increase functional independence with ADLs by performing:    Feeding with Supervision.  UE Dressing with Moderate Assistance.  LE Dressing with Moderate Assistance.  Grooming while seated with Minimal Assistance.  Toilet transfer to bedside commode with Minimal Assistance.  Upper extremity exercise program x10 reps per handout, with assistance as needed.    Outcome: Ongoing, Progressing

## 2022-12-15 NOTE — SUBJECTIVE & OBJECTIVE
Interval History: states she did not sleep much last night.  Episodes of confusion per nursing.    Review of Systems   HENT:  Negative for ear discharge and ear pain.    Eyes:  Negative for discharge and itching.   Endocrine: Negative for cold intolerance and heat intolerance.   Neurological:  Negative for seizures and syncope.   Objective:     Vital Signs (Most Recent):  Temp: 98.5 °F (36.9 °C) (12/15/22 1153)  Pulse: 80 (12/15/22 1153)  Resp: 18 (12/15/22 1153)  BP: 121/60 (12/15/22 1153)  SpO2: 98 % (12/15/22 1153) Vital Signs (24h Range):  Temp:  [97.3 °F (36.3 °C)-98.5 °F (36.9 °C)] 98.5 °F (36.9 °C)  Pulse:  [] 80  Resp:  [15-29] 18  SpO2:  [92 %-100 %] 98 %  BP: ()/(53-63) 121/60     Weight: 80 kg (176 lb 5.9 oz)  Body mass index is 27.62 kg/m².    Intake/Output Summary (Last 24 hours) at 12/15/2022 1351  Last data filed at 12/15/2022 0300  Gross per 24 hour   Intake 1088.54 ml   Output 550 ml   Net 538.54 ml      Physical Exam  Vitals and nursing note reviewed.   Constitutional:       General: She is not in acute distress.     Appearance: She is not ill-appearing or toxic-appearing.   HENT:      Head: Normocephalic and atraumatic.      Nose: Nose normal.      Mouth/Throat:      Mouth: Mucous membranes are moist.   Cardiovascular:      Rate and Rhythm: Normal rate. Rhythm irregular.      Pulses: Normal pulses.      Heart sounds: Normal heart sounds. No murmur heard.    No gallop.   Pulmonary:      Effort: Pulmonary effort is normal. No respiratory distress.      Breath sounds: Normal breath sounds. No wheezing or rales.   Abdominal:      General: Bowel sounds are normal. There is no distension.      Palpations: Abdomen is soft.      Tenderness: There is no abdominal tenderness. There is no guarding.   Musculoskeletal:      Right lower leg: No edema.      Left lower leg: No edema.   Skin:     General: Skin is warm and dry.   Neurological:      Mental Status: She is alert.      Comments: Awake and  alert.  Oriented to person and place, but confused.   Psychiatric:         Speech: Speech is tangential.         Behavior: Behavior is slowed.         Cognition and Memory: Cognition is impaired.       Significant Labs: All pertinent labs within the past 24 hours have been reviewed.  BMP:   Recent Labs   Lab 12/15/22  0521   GLU 99      K 4.5   *   CO2 17*   BUN 28*   CREATININE 1.1   CALCIUM 8.2*   MG 2.1     CBC:   Recent Labs   Lab 12/14/22  0148 12/15/22  0521   WBC 14.94* 11.50   HGB 10.2* 10.8*   HCT 30.4* 33.7*    298       Significant Imaging: I have reviewed all pertinent imaging results/findings within the past 24 hours.
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
## 2022-12-15 NOTE — ASSESSMENT & PLAN NOTE
Patient with acute kidney injury likely due to IVVD/dehydration KIRSTIN is currently stable. Labs reviewed- Renal function/electrolytes with Estimated Creatinine Clearance: 37.8 mL/min (based on SCr of 1.1 mg/dL). according to latest data. Monitor urine output and serial BMP and adjust therapy as needed. Avoid nephrotoxins and renally dose meds for GFR listed above.   - suspect this is due to shock and medication changes  - will DC IVF  - continue to hold ARB and lasix

## 2022-12-15 NOTE — NURSING TRANSFER
Nursing Transfer Note      12/15/2022     Reason patient is being transferred: step down    Transfer To: 403    Transfer via bed    Transfer with cardiac monitoring    Transported by transport staff and this RN    Medicines sent: mupirocin and novolog pen    Any special needs or follow-up needed: n/a    Chart send with patient: Yes    Notified:This RN left voicemail with sonIsidro, at [phone]    Patient reassessed at: 12/15/22, 0300 (date, time)    Upon arrival to floor: cardiac monitor applied, patient oriented to room, call bell in reach, and bed in lowest position    Bedside handoff and 4 eyes completed with Jeanette, RN and LISANDRO Cheng.

## 2022-12-15 NOTE — PROGRESS NOTES
Follow Up    [phone], Indy in admissions was not available, Left message with Macrina to have her call me back in reference to referral.    [phone], no, unable to meet care needs.    [phone], awaiting a return call.    [phone],no, unable to meet care needs.

## 2022-12-15 NOTE — SUBJECTIVE & OBJECTIVE
Interval History:  No cardiac complaints.  Heart rate better.    Review of Systems   Unable to perform ROS: Dementia   Objective:     Vital Signs (Most Recent):  Temp: 98.3 °F (36.8 °C) (12/14/22 0705)  Pulse: 83 (12/14/22 1700)  Resp: 16 (12/14/22 1700)  BP: (!) 105/58 (12/14/22 1700)  SpO2: 97 % (12/14/22 1700)   Vital Signs (24h Range):  Temp:  [97.6 °F (36.4 °C)-98.3 °F (36.8 °C)] 98.3 °F (36.8 °C)  Pulse:  [] 83  Resp:  [16-29] 16  SpO2:  [90 %-100 %] 97 %  BP: ()/(50-64) 105/58     Weight: 80 kg (176 lb 5.9 oz)  Body mass index is 27.62 kg/m².     SpO2: 97 %         Intake/Output Summary (Last 24 hours) at 12/14/2022 1832  Last data filed at 12/14/2022 0600  Gross per 24 hour   Intake 1490.09 ml   Output 175 ml   Net 1315.09 ml       Lines/Drains/Airways       Drain  Duration             Female External Urinary Catheter 12/13/22 1330 1 day              Peripheral Intravenous Line  Duration                  Peripheral IV - Single Lumen 12/13/22 0226 20 G Distal;Left;Posterior Forearm 1 day         Peripheral IV - Single Lumen 12/13/22 0312 18 G Anterior;Right Forearm 1 day         Peripheral IV - Single Lumen 12/13/22 Right Antecubital 1 day                    Physical Exam  HENT:      Head: Atraumatic.   Eyes:      Conjunctiva/sclera: Conjunctivae normal.   Cardiovascular:      Rate and Rhythm: Rhythm irregular.      Heart sounds: Murmur heard.   Pulmonary:      Effort: No respiratory distress.   Abdominal:      Palpations: Abdomen is soft.   Skin:     General: Skin is warm.   Neurological:      Mental Status: She is disoriented.       Significant Labs: BMP:   Recent Labs   Lab 12/13/22  0121 12/13/22  0545 12/14/22  0148   * 295* 86    138 140   K 4.4 3.8 4.4    114* 116*   CO2 20* 17* 13*   BUN 33* 33* 33*   CREATININE 1.7* 1.6* 1.6*   CALCIUM 8.8 7.2* 7.7*   MG 1.8 1.4* 1.4*   , CMP   Recent Labs   Lab 12/13/22  0121 12/13/22  0545 12/14/22  0148    138 140   K 4.4 3.8  4.4    114* 116*   CO2 20* 17* 13*   * 295* 86   BUN 33* 33* 33*   CREATININE 1.7* 1.6* 1.6*   CALCIUM 8.8 7.2* 7.7*   PROT 7.2 5.1* 5.8*   ALBUMIN 3.1* 2.2* 2.6*   BILITOT 0.3 0.3 0.3   ALKPHOS 120 80 81   AST 22 17 22   ALT 15 12 14   ANIONGAP 13 7* 11   , CBC   Recent Labs   Lab 12/13/22  0121 12/13/22  0545 12/14/22  0148   WBC 12.33 10.92 14.94*   HGB 12.2 9.6* 10.2*   HCT 36.8* 29.4* 30.4*    228 291   , INR No results for input(s): INR, PROTIME in the last 48 hours., Lipid Panel No results for input(s): CHOL, HDL, LDLCALC, TRIG, CHOLHDL in the last 48 hours., Troponin   Recent Labs   Lab 12/13/22  0121 12/13/22  0545   TROPONINI 0.017 <0.006   , and All pertinent lab results from the last 24 hours have been reviewed.    Significant Imaging: Echocardiogram: Transthoracic echo (TTE) complete ([facility] Only):   Results for orders placed or performed during the hospital encounter of 12/13/22   Echo Saline Bubble? No   Result Value Ref Range    BSA 2.02 m2    TDI SEPTAL 0.06 m/s    LV LATERAL E/E' RATIO 5.40 m/s    LV SEPTAL E/E' RATIO 9.00 m/s    LA WIDTH 4.70 cm    IVC diameter 1.79 cm    Left Ventricular Outflow Tract Mean Velocity 0.48 cm/s    Left Ventricular Outflow Tract Mean Gradient 1.01 mmHg    TDI LATERAL 0.10 m/s    PV PEAK VELOCITY 0.78 cm/s    LVIDd 3.39 (A) 3.5 - 6.0 cm    IVS 1.41 (A) 0.6 - 1.1 cm    Posterior Wall 1.45 (A) 0.6 - 1.1 cm    LVIDs 2.43 2.1 - 4.0 cm    FS 28 28 - 44 %    LA volume 107.27 cm3    Sinus 3.01 cm    STJ 2.41 cm    Ascending aorta 3.08 cm    LV mass 171.31 g    LA size 4.79 cm    RVDD 4.25 cm    TAPSE 1.59 cm    Left Ventricle Relative Wall Thickness 0.86 cm    AV mean gradient 4 mmHg    AV valve area 2.68 cm2    AV Velocity Ratio 0.52     AV index (prosthetic) 0.63     Mean e' 0.08 m/s    IVRT 111.32 msec    LVOT diameter 2.32 cm    LVOT area 4.2 cm2    LVOT peak ramses 0.68 m/s    LVOT peak VTI 18.50 cm    Ao peak ramses 1.30 m/s    Ao VTI 29.2 cm    LVOT  stroke volume 78.17 cm3    AV peak gradient 7 mmHg    TV peak gradient 1.54 mmHg    E/E' ratio 6.75 m/s    MV Peak E Duy 0.54 m/s    TR Max Duy 2.65 m/s    LV Systolic Volume 20.88 mL    LV Systolic Volume Index 10.9 mL/m2    LV Diastolic Volume 47.18 mL    LV Diastolic Volume Index 24.57 mL/m2    LA Volume Index 55.9 mL/m2    LV Mass Index 89 g/m2    RA Major Axis 5.87 cm    Left Atrium Minor Axis 4.72 cm    Left Atrium Major Axis 6.90 cm    Triscuspid Valve Regurgitation Peak Gradient 28 mmHg    RA Width 5.11 cm    Right Atrial Pressure (from IVC) 8 mmHg    EF 55 %    TV rest pulmonary artery pressure 36 mmHg    Narrative    · The estimated ejection fraction is 55%.  · The left ventricle is normal in size with normal systolic function.  · Severe left atrial enlargement.  · Indeterminate left ventricular diastolic function.  · Mild right ventricular enlargement.  · Severe right atrial enlargement.  · Mild tricuspid regurgitation.  · Mild mitral regurgitation.  · Intermediate central venous pressure (8 mmHg).  · The estimated PA systolic pressure is 36 mmHg.

## 2022-12-15 NOTE — ASSESSMENT & PLAN NOTE
Patient with Long standing persistent (>12 months) atrial fibrillation which is controlled currently with Beta Blocker and Flecainide. Patient is currently in atrial fibrillation.[id number] Score: 5.  Anticoagulation indicated. Anticoagulation done with Eliquis.  HR now at times tachycardic. Restarted B blocker.  Resume home eliquis

## 2022-12-15 NOTE — PLAN OF CARE
"Patient with transfer orders, transferred to med/surg room 403 this shift. Pt oriented to self and time. Frequent reminders given to place and situation. Pt continued to hallucinate, yelling "Jody, watch out" thinking that she was seeing her daughter darting across a street with traffic. Other times can tell you stories that make a lot of sense. Pt did sleep for about 2 hours this shift. Remains in afib on cardiac monitor, HR 50s-100s. PO Metoprolol restarted this PM. BP stable. Room air. Temp 97.8 oral. Pt with no UO during AM shift. Bladder scan performed with AM RN. 420 cc in bladder. Pt coached on attempting to use purewick or bedpan, no success. eICU notified and ordered NS @ 100cc/hr, stated to recheck bladder scan in 2 hrs. At 2 hr torsten 540cc in bladder. eICU notified. Stated to place leslie if no UO by 0200. Leslie placed at 0200. 550cc UO. UA ordered by eICU after reported sediment and foul smelling urine. UA collected and sent. Pt with heel boots in place, sacral foam. Turned q2 hr per protocol. No falls or injury this shift. Pt with maroon area noted to sacrum upon ransfer. LDA has been updated. Plan of care reviewed.   "

## 2022-12-15 NOTE — PROGRESS NOTES
Follow up    [phone], Spoke with Macrina(), who stated that Indy in admissions, said she did not see referral in [facility] ,and requested to send it by fax. Referral submitted via fax([phone].

## 2022-12-15 NOTE — ASSESSMENT & PLAN NOTE
"Patient with sudden onset of acute encephalopathy.  No need for psychiatric hospitalization as this is metabolic.  Stop PEC/CEC and 1:1 sitter.  Does not need inpatient psychiatric care after being medically cleared.  Encephalopathy likely due to multiple metabolic factors with concern for renal function and polypharmacy.  Stop all psychiatric meds and stop gabapentin.  Allow her to return to her baseline.  May be similar to incident a year ago when she was dehydrated.  MRI shows old strokes and this may be a manifestation of a vascular dementia process.  Continue reorient patient and utilize delirium precautions.    12/15/22 - Patient noticeably more confused and disoriented today.  Can be an "ICU psychosis/delirium" from being inpatient and changing rooms, or it can be due to her urinary problems (retention, clumps, odor).  Again, she may have an underlying vascular dementia process but was functioning in a much better state prior to hospitalization.  Hold any psychiatric medications or treatment at this time.  Best option would be to return to SNF, then home with family.  Suspect that she will need 24 hour care at home.   "

## 2022-12-15 NOTE — PLAN OF CARE
[phone] Drew says Isidro is the POA   [place] called Isidro Jennings the [phone], TN informed both sons that patient has been denied by snfs: [facility] and [facility].  Awaiting Keith Escalera's decision.  Isidro agreed to expand choice search on the [facility] and to [facility] in [place].

## 2022-12-16 PROBLEM — Z51.5 PALLIATIVE CARE ENCOUNTER: Status: ACTIVE | Noted: 2022-01-01

## 2022-12-16 PROBLEM — R41.82 ALTERED MENTAL STATUS: Status: ACTIVE | Noted: 2022-01-01

## 2022-12-16 PROBLEM — Z71.89 ACP (ADVANCE CARE PLANNING): Status: ACTIVE | Noted: 2022-01-01

## 2022-12-16 NOTE — PROGRESS NOTES
"[facility] Medicine  Progress Note    Patient Name: Tierra Jennings  MRN: [id number]  Patient Class: IP- Inpatient   Admission Date: 12/13/2022  Length of Stay: 3 days  Attending Physician: Jw Perez MD  Primary Care Provider: Misael Jasmine MD        Subjective:     Principal Problem:Shock, unspecified        HPI:  Ms. Jennings is an 88yo lady with a past medical history of DM2, HTN, CVA, dementia, CAD with NSTEMI, and PAfib.  She has no TTE on file here or in Care Everywhere.    She was recently admitted to [facility] on 12/9/22 after being taken to [facility] by her family due to hallucinations and delusions of, "being kidnapped."  She had a PEC placed that same day.  Her son states she was hallucinating people who were not there, such as teenagers running around her house.      I am not sure what occurred at [place] today, but they did send a clinical sheet over stating, "BP 79/50, RR 14."  From her med list, it appears she had been started on only Risperdal 0.5mg po QHS.  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  She has continued on her Toprol XL and her Flecainide.          Overview/Hospital Course:  Ms Tierra Jennings was admitted with shock. Suspect this may be in part due to medication changes. No signs of infection. Troponin negative. TTE normal EF. ACTH stim test normal. Dr Rizvi discussed code status and ICU care with family- DNR, no vasopressors, no central lines. She improved with IVF alone. However, she is still altered. This is newer onset with confusion and hallucinations over the last week which was the cause of her admission to [facility]. " Neurology and Psychiatry consulted. CEC rescinded. Hypothermia has resolved. BP is improving. HR is improved but remains in Afib. PT, OT, Speech consulted. Stepped down to floor.         Interval History: still very confused.    Review of Systems   HENT:  Negative for ear discharge and ear pain.    Eyes:  Negative for discharge and itching.   Endocrine: Negative for cold intolerance and heat intolerance.   Neurological:  Negative for seizures and syncope.   Objective:     Vital Signs (Most Recent):  Temp: 96.2 °F (35.7 °C) (12/16/22 1127)  Pulse: 80 (12/16/22 1127)  Resp: 20 (12/16/22 1127)  BP: (!) 175/80 (12/16/22 1127)  SpO2: 98 % (12/16/22 1127) Vital Signs (24h Range):  Temp:  [96.2 °F (35.7 °C)-98.2 °F (36.8 °C)] 96.2 °F (35.7 °C)  Pulse:  [77-89] 80  Resp:  [18-20] 20  SpO2:  [95 %-99 %] 98 %  BP: (118-175)/(56-80) 175/80     Weight: 80 kg (176 lb 5.9 oz)  Body mass index is 27.62 kg/m².    Intake/Output Summary (Last 24 hours) at 12/16/2022 1153  Last data filed at 12/16/2022 0835  Gross per 24 hour   Intake 720 ml   Output 700 ml   Net 20 ml        Physical Exam  Vitals and nursing note reviewed.   Constitutional:       General: She is not in acute distress.     Appearance: She is not ill-appearing or toxic-appearing.   HENT:      Head: Normocephalic and atraumatic.      Nose: Nose normal.      Mouth/Throat:      Mouth: Mucous membranes are moist.   Cardiovascular:      Rate and Rhythm: Normal rate. Rhythm irregular.      Pulses: Normal pulses.      Heart sounds: Normal heart sounds. No murmur heard.    No gallop.   Pulmonary:      Effort: Pulmonary effort is normal. No respiratory distress.      Breath sounds: Normal breath sounds. No wheezing or rales.   Abdominal:      General: Bowel sounds are normal. There is no distension.      Palpations: Abdomen is soft.      Tenderness: There is no abdominal tenderness. There is no guarding.   Musculoskeletal:      Right lower leg: No edema.      Left lower leg: No  edema.   Skin:     General: Skin is warm and dry.   Neurological:      Mental Status: She is alert.      Comments: Awake and alert.  Oriented to person and place, but confused.   Psychiatric:         Speech: Speech is tangential.         Behavior: Behavior is slowed.         Cognition and Memory: Cognition is impaired.       Significant Labs: All pertinent labs within the past 24 hours have been reviewed.  BMP:   Recent Labs   Lab 12/16/22  0451   GLU 97      K 4.4   *   CO2 15*   BUN 18   CREATININE 0.7   CALCIUM 8.5*   MG 2.0       CBC:   Recent Labs   Lab 12/15/22  0521 12/16/22  0451   WBC 11.50 11.74   HGB 10.8* 11.2*   HCT 33.7* 33.1*    294         Significant Imaging: I have reviewed all pertinent imaging results/findings within the past 24 hours.      Assessment/Plan:      * Shock, unspecified  Suspect this is due to medications. No evidence of sepsis currently. Does not appear volume overloaded. ACTH stim test negative. TTE normal.   - DC IVF  - BP now improved  - monitor       Acute encephalopathy  She has baseline dementia, but seems pretty high functioning per sons at normal baseline. Family reporting that she was living in dependently until 1 week ago when she developed worsening confusion and hallucinations, which prompted admission to [facility].   - currently waxing and waning delirium.   - suspect some of this is medication induced, now worsened by hospitalization.  - MRI brain shows old strokes  - Psych consulted- hold all psych meds. Discontinued CEC.   - Neurology consulted  - PT, OT, Speech consulted   Repeat UA is slightly abnormal.  Doubt contributing, but start Rocephin pending cultures.    Hypomagnesemia  Replace as needed.        DNR (do not resuscitate)  See ACP note at admission.      Coronary artery disease involving native coronary artery of native heart without angina pectoris  Trop negative x2  Doubt ACS  TTE normal EF      History of CVA (cerebrovascular  accident)  CTH no acute changes; however, she does have newer mental status changes with hallucinations. MRI brain no acute changes. Shows stable encephalomalacia L occipital lobe and remote L basal ganglia lacunar infarct.     Persistent atrial fibrillation  Patient with Long standing persistent (>12 months) atrial fibrillation which is controlled currently with Beta Blocker and Flecainide. Patient is currently in atrial fibrillation.[id number] Score: 5.  Anticoagulation indicated. Anticoagulation done with Eliquis.  HR now at times tachycardic. Restarted B blocker.  Resume home eliquis        Type 2 diabetes mellitus without complication, without long-term current use of insulin  Patient's FSGs are controlled on current medication regimen.  Last A1c reviewed-   Lab Results   Component Value Date    HGBA1C 5.8 (H) 12/13/2022     Most recent fingerstick glucose reviewed-   Recent Labs   Lab 12/15/22  1653 12/15/22  1957 12/16/22  0727 12/16/22  1125   POCTGLUCOSE 180* 145* 92 100     Current correctional scale  Low  Maintain anti-hyperglycemic dose as follows-   Antihyperglycemics (From admission, onward)    Start     Stop Route Frequency Ordered    12/14/22 1230  insulin aspart U-100 pen 0-5 Units         -- SubQ Before meals & nightly PRN 12/14/22 1130            VTE Risk Mitigation (From admission, onward)         Ordered     apixaban tablet 2.5 mg  2 times daily         12/13/22 1145     IP VTE HIGH RISK PATIENT  Once         12/13/22 0431     Place sequential compression device  Until discontinued         12/13/22 0431     Place LEONCIO hose  Until discontinued         12/13/22 0431                Discharge Planning   JORGE:      Code Status: DNR   Is the patient medically ready for discharge?:     Reason for patient still in hospital (select all that apply): Patient trending condition  Discharge Plan A: Skilled Nursing Facility   Discharge Delays: None known at this time              Jw Perez MD  Department  of [facility] Medicine   [facility] - Med Surg

## 2022-12-16 NOTE — PLAN OF CARE
Problem: Infection  Goal: Absence of Infection Signs and Symptoms  Outcome: Ongoing, Progressing     Problem: Diabetes Comorbidity  Goal: Blood Glucose Level Within Targeted Range  Outcome: Ongoing, Progressing     Problem: Fluid and Electrolyte Imbalance (Acute Kidney Injury/Impairment)  Goal: Fluid and Electrolyte Balance  Outcome: Ongoing, Progressing     Problem: Oral Intake Inadequate (Acute Kidney Injury/Impairment)  Goal: Optimal Nutrition Intake  Outcome: Ongoing, Progressing     Problem: Renal Function Impairment (Acute Kidney Injury/Impairment)  Goal: Effective Renal Function  Outcome: Ongoing, Progressing

## 2022-12-16 NOTE — ASSESSMENT & PLAN NOTE
Patient's FSGs are controlled on current medication regimen.  Last A1c reviewed-   Lab Results   Component Value Date    HGBA1C 5.8 (H) 12/13/2022     Most recent fingerstick glucose reviewed-   Recent Labs   Lab 12/15/22  1653 12/15/22  1957 12/16/22  0727 12/16/22  1125   POCTGLUCOSE 180* 145* 92 100     Current correctional scale  Low  Maintain anti-hyperglycemic dose as follows-   Antihyperglycemics (From admission, onward)    Start     Stop Route Frequency Ordered    12/14/22 1230  insulin aspart U-100 pen 0-5 Units         -- SubQ Before meals & nightly PRN 12/14/22 1130

## 2022-12-16 NOTE — PT/OT/SLP PROGRESS
Physical Therapy Treatment    Patient Name:  Tierra Jennings   MRN:  [id number]    Recommendations:     Discharge Recommendations: nursing facility, skilled  Discharge Equipment Recommendations:  (per SNF)  Barriers to discharge:  Pt is not functioning at baseline and is at increased risk for falls, readmission, and morbidity if she returns to prior living arrangements at present time.     Assessment:     Tierra Jennings is a 89 y.o. female admitted with a medical diagnosis of Shock, unspecified.  She presents with the following impairments/functional limitations: weakness, gait instability, impaired endurance, impaired balance, decreased safety awareness, impaired self care skills, impaired cognition, impaired skin, impaired functional mobility, edema, decreased coordination Pt more oriented and with increased functional mobility today..    Rehab Prognosis: Fair; patient would benefit from acute skilled PT services to address these deficits and reach maximum level of function.    Recent Surgery: * No surgery found *      Plan:     During this hospitalization, patient to be seen  (3-5x/wk) to address the identified rehab impairments via gait training, therapeutic activities, therapeutic exercises, neuromuscular re-education, wheelchair management/training and progress toward the following goals:    Plan of Care Expires:  12/29/22    Subjective     Chief Complaint: bugs on the carpet  Patient/Family Comments/goals: Pt agreeable to treatment  Pain/Comfort:  Pain Rating 1: 0/10      Objective:     Communicated with nursing prior to session.  Patient found HOB elevated with leslie catheter, telemetry, peripheral IV, bed alarm, pressure relief boots upon PT entry to room.     General Precautions: Standard, fall (skin breakdown)  Orthopedic Precautions: N/A  Braces: N/A  Respiratory Status: Room air     Functional Mobility:  Bed Mobility:     Rolling Left:  maximal assistance and of 2 persons  Scooting: stand by  assistance and contact guard assistance along EOB and to EOB in sitting with VC/TC for hand placement and forward weight shift over AURA  Supine to Sit: maximal assistance and of 2 persons and with VC/TC for hand placement and body mechanics  Sit to Supine: N/T  Transfers:     Sit to Stand:  moderate assistance, of 2 persons, and with Vc/TC for hand placement and forward weight shift over AURA  with rolling walker  Gait: Gait training with RW and CGA for balance and Min A for walker management and max VC/TC for increased trunk ext and walker management approx 30'.  Balance: Fair+ sit, poor head control, Fair stand, poor head control      AM-PAC 6 CLICK MOBILITY  Turning over in bed (including adjusting bedclothes, sheets and blankets)?: 2  Sitting down on and standing up from a chair with arms (e.g., wheelchair, bedside commode, etc.): 2  Moving from lying on back to sitting on the side of the bed?: 2  Moving to and from a bed to a chair (including a wheelchair)?: 2  Need to walk in hospital room?: 3  Climbing 3-5 steps with a railing?: 1  Basic Mobility Total Score: 12       Treatment & Education:  Bed mobility, gait, and transfer training as above. Pt sat at EOB with SBA/CGA and VC/TC for forward weight shift over AURA to perform FAQ's x 10 reps ea and ADL's with OT.  Pt required Total A for line management  Patient left sitting edge of bed with all lines intact, call button in reach, and DEVINE and family present..    GOALS:   Multidisciplinary Problems       Physical Therapy Goals          Problem: Physical Therapy    Goal Priority Disciplines Outcome Goal Variances Interventions   Physical Therapy Goal     PT, PT/OT Ongoing, Progressing     Description: Goals to be met by: 22     Patient will increase functional independence with mobility by performin. Supine to sit with Stand-by Assistance  2. Rolling to Left and Right with Stand-by Assistance.  3. Bed to chair transfer with Contact Guard Assistance     4. Wheelchair propulsion x25 feet with Stand-by Assistance   5. Gait with RW and Min A x 10-15'  6. Lower extremity exercise program x10 reps per handout, with supervision                         Time Tracking:     PT Received On: 12/16/22  PT Start Time: 1202     PT Stop Time: 1232  PT Total Time (min): 30 min     Billable Minutes: Gait Training 15 and Therapeutic Activity 15 Co-treat with DEVINE    Treatment Type: Evaluation  PT/PTA: PT     PTA Visit Number: 0     12/16/2022

## 2022-12-16 NOTE — ASSESSMENT & PLAN NOTE
She has baseline dementia, but seems pretty high functioning per sons at normal baseline. Family reporting that she was living in dependently until 1 week ago when she developed worsening confusion and hallucinations, which prompted admission to [facility].   - currently waxing and waning delirium.   - suspect some of this is medication induced, now worsened by hospitalization.  - MRI brain shows old strokes  - Psych consulted- hold all psych meds. Discontinued CEC.   - Neurology consulted  - PT, OT, Speech consulted   Repeat UA is slightly abnormal.  Doubt contributing, but start Rocephin pending cultures.

## 2022-12-16 NOTE — PLAN OF CARE
Problem: Physical Therapy  Goal: Physical Therapy Goal  Description: Goals to be met by: 22     Patient will increase functional independence with mobility by performin. Supine to sit with Stand-by Assistance  2. Rolling to Left and Right with Stand-by Assistance.  3. Bed to chair transfer with Contact Guard Assistance    4. Wheelchair propulsion x25 feet with Stand-by Assistance   5. Gait with RW and Min A x 10-15'  6. Lower extremity exercise program x10 reps per handout, with supervision    Outcome: Ongoing, Progressing   Pt alert and more oriented today with increased mobility.  Continue with POC, SNf recommended.

## 2022-12-16 NOTE — PLAN OF CARE
Problem: Adult Inpatient Plan of Care  Goal: Plan of Care Review  Outcome: Ongoing, Progressing  Goal: Patient-Specific Goal (Individualized)  Outcome: Ongoing, Progressing  Goal: Absence of Hospital-Acquired Illness or Injury  Outcome: Ongoing, Progressing  Goal: Optimal Comfort and Wellbeing  Outcome: Ongoing, Progressing     Problem: Infection  Goal: Absence of Infection Signs and Symptoms  Outcome: Ongoing, Progressing     Problem: Diabetes Comorbidity  Goal: Blood Glucose Level Within Targeted Range  Outcome: Ongoing, Progressing     Problem: Fluid and Electrolyte Imbalance (Acute Kidney Injury/Impairment)  Goal: Fluid and Electrolyte Balance  Outcome: Ongoing, Progressing     Problem: Oral Intake Inadequate (Acute Kidney Injury/Impairment)  Goal: Optimal Nutrition Intake  Outcome: Ongoing, Progressing     Problem: Renal Function Impairment (Acute Kidney Injury/Impairment)  Goal: Effective Renal Function  Outcome: Ongoing, Progressing     Problem: Impaired Wound Healing  Goal: Optimal Wound Healing  Outcome: Ongoing, Progressing     Problem: Fall Injury Risk  Goal: Absence of Fall and Fall-Related Injury  Outcome: Ongoing, Progressing     Problem: Skin Injury Risk Increased  Goal: Skin Health and Integrity  Outcome: Ongoing, Progressing

## 2022-12-16 NOTE — PT/OT/SLP PROGRESS
Occupational Therapy   Treatment    Name: Tierra Jennings  MRN: [id number]  Admitting Diagnosis:  Shock, unspecified       Recommendations:     Discharge Recommendations: nursing facility, skilled  Discharge Equipment Recommendations:  bedside commode, bath bench  Barriers to discharge:  Pt is not functioning at baseline and is at increased risk for falls, readmission, and morbidity if she returns to prior living arrangements at present time.     Assessment:     Tierra Jennings is a 89 y.o. female with a medical diagnosis of Shock, unspecified.  She presents with the following performance deficits affecting function: weakness, impaired functional mobility, impaired cognition, decreased safety awareness, impaired endurance, gait instability, decreased coordination, impaired skin, impaired balance, decreased upper extremity function, decreased lower extremity function, impaired self care skills, decreased ROM.     Rehab Prognosis:  Fair; patient would benefit from acute skilled OT services to address these deficits and reach maximum level of function.       Plan:     Patient to be seen 3 x/week, 5 x/week to address the above listed problems via self-care/home management, therapeutic activities, therapeutic exercises  Plan of Care Expires: 12/23/22  Plan of Care Reviewed with: patient    Subjective     Pain/Comfort:  Pain Rating 1: 0/10    Objective:     Communicated with: Nurse prior to session.  Patient found HOB elevated with leslie catheter, telemetry, peripheral IV, bed alarm, pressure relief boots upon OT entry to room. Pt daughter in law present at bedside.    General Precautions: Standard, fall (AMS)    Orthopedic Precautions:N/A  Braces: N/A  Respiratory Status: Room air     Occupational Performance: Pt oriented and able to follow commands appropriately to participate in therapy tasks. Improved mobility this date.    Bed Mobility:    Patient completed Rolling/Turning to Left with  maximal assistance and 2  persons  Patient completed Scooting/Bridging with contact guard assistance to scoot anteriorly to EOB with verbal/tactile cues for technique  Patient completed Supine to Sit with maximal assistance and 2 persons      Functional Mobility/Transfers:  Patient completed Sit <> Stand Transfer with moderate assistance and of 2 persons  with  rolling walker   Functional Mobility: ~ 30 ft with min A and verbal/tactile cues for upright posture. See PT note for gait assessment.    Anctivities of Daily Living:  Feeding:  supervision with lunch seated EOB  Grooming: stand by assistance to comb hair and clean hands with wipes seated EOB  Upper Body Dressing: maximal assistance to don back gown seated EOB  Lower Body Dressing: total assistance to don socks      [facility] 6 Click ADL: 13    Treatment & Education:  Bed mobility, functional transfer/mobility , and ADL completed as noted above.   Pt educated on safety awareness with all OOB mobility and ADL .   Tolerated sitting EOB with CGA/SBA during ADL's  Family at bedside educated on OT role/POC.    Patient left sitting edge of bed with all lines intact, call button in reach, nurse notified, and family present. Instructed daughter in law to call for nursing staff when pt ready to return to supine; verbalized understanding.    GOALS:   Multidisciplinary Problems       Occupational Therapy Goals          Problem: Occupational Therapy    Goal Priority Disciplines Outcome Interventions   Occupational Therapy Goal     OT, PT/OT Ongoing, Progressing    Description: Goals to be met by: 12/15/22     Patient will increase functional independence with ADLs by performing:    Feeding with Supervision.  UE Dressing with Moderate Assistance.  LE Dressing with Moderate Assistance.  Grooming while seated with Minimal Assistance.  Toilet transfer to bedside commode with Minimal Assistance.  Upper extremity exercise program x10 reps per handout, with assistance as needed.                         Time  Tracking:     OT Date of Treatment: 12/16/22  OT Start Time: 1202  OT Stop Time: 1235  OT Total Time (min): 33 min    Billable Minutes:Self Care/Home Management 18  Therapeutic Activity 15  Co-tx with PT    OT/AKIRA: AKIRA CHAMPION Visit Number: 1    12/16/2022

## 2022-12-16 NOTE — SUBJECTIVE & OBJECTIVE
Interval History: still very confused.    Review of Systems   HENT:  Negative for ear discharge and ear pain.    Eyes:  Negative for discharge and itching.   Endocrine: Negative for cold intolerance and heat intolerance.   Neurological:  Negative for seizures and syncope.   Objective:     Vital Signs (Most Recent):  Temp: 96.2 °F (35.7 °C) (12/16/22 1127)  Pulse: 80 (12/16/22 1127)  Resp: 20 (12/16/22 1127)  BP: (!) 175/80 (12/16/22 1127)  SpO2: 98 % (12/16/22 1127) Vital Signs (24h Range):  Temp:  [96.2 °F (35.7 °C)-98.2 °F (36.8 °C)] 96.2 °F (35.7 °C)  Pulse:  [77-89] 80  Resp:  [18-20] 20  SpO2:  [95 %-99 %] 98 %  BP: (118-175)/(56-80) 175/80     Weight: 80 kg (176 lb 5.9 oz)  Body mass index is 27.62 kg/m².    Intake/Output Summary (Last 24 hours) at 12/16/2022 1153  Last data filed at 12/16/2022 0835  Gross per 24 hour   Intake 720 ml   Output 700 ml   Net 20 ml        Physical Exam  Vitals and nursing note reviewed.   Constitutional:       General: She is not in acute distress.     Appearance: She is not ill-appearing or toxic-appearing.   HENT:      Head: Normocephalic and atraumatic.      Nose: Nose normal.      Mouth/Throat:      Mouth: Mucous membranes are moist.   Cardiovascular:      Rate and Rhythm: Normal rate. Rhythm irregular.      Pulses: Normal pulses.      Heart sounds: Normal heart sounds. No murmur heard.    No gallop.   Pulmonary:      Effort: Pulmonary effort is normal. No respiratory distress.      Breath sounds: Normal breath sounds. No wheezing or rales.   Abdominal:      General: Bowel sounds are normal. There is no distension.      Palpations: Abdomen is soft.      Tenderness: There is no abdominal tenderness. There is no guarding.   Musculoskeletal:      Right lower leg: No edema.      Left lower leg: No edema.   Skin:     General: Skin is warm and dry.   Neurological:      Mental Status: She is alert.      Comments: Awake and alert.  Oriented to person and place, but confused.    Psychiatric:         Speech: Speech is tangential.         Behavior: Behavior is slowed.         Cognition and Memory: Cognition is impaired.       Significant Labs: All pertinent labs within the past 24 hours have been reviewed.  BMP:   Recent Labs   Lab 12/16/22  0451   GLU 97      K 4.4   *   CO2 15*   BUN 18   CREATININE 0.7   CALCIUM 8.5*   MG 2.0       CBC:   Recent Labs   Lab 12/15/22  0521 12/16/22  0451   WBC 11.50 11.74   HGB 10.8* 11.2*   HCT 33.7* 33.1*    294         Significant Imaging: I have reviewed all pertinent imaging results/findings within the past 24 hours.

## 2022-12-16 NOTE — SUBJECTIVE & OBJECTIVE
Past Medical History:   Diagnosis Date    Diabetes mellitus, type 2     Heart murmur     History of psychiatric hospitalization     Hypertension     Paroxysmal atrial fibrillation     Stroke      Past Surgical History:   Procedure Laterality Date    EYE SURGERY      TONSILLECTOMY      VEIN LIGATION AND STRIPPING       Review of patient's allergies indicates:   Allergen Reactions    Aspirin Other (See Comments)     Bleeding    Codeine     Erythromycin Rash     Medications:  Continuous Infusions:    Scheduled Meds:   apixaban  2.5 mg Oral BID    cefTRIAXone (ROCEPHIN) IVPB  1 g Intravenous Q24H    melatonin  6 mg Oral Nightly    metoprolol tartrate  12.5 mg Oral BID    mupirocin   Nasal BID     PRN Meds:acetaminophen, albuterol-ipratropium, bisacodyL, dextrose 10%, dextrose 10%, glucagon (human recombinant), insulin aspart U-100, naloxone, ondansetron, prochlorperazine, sodium chloride 0.9%    Family History       Problem Relation (Age of Onset)    Heart disease Mother          Tobacco Use    Smoking status: Never    Smokeless tobacco: Never   Substance and Sexual Activity    Alcohol use: No    Drug use: No    Sexual activity: Not Currently     Review of Systems   Unable to perform ROS: Dementia   Objective:     Vital Signs (Most Recent):  Temp: 97.5 °F (36.4 °C) (12/16/22 0728)  Pulse: 89 (12/16/22 0728)  Resp: 20 (12/16/22 0728)  BP: 126/65 (12/16/22 0728)  SpO2: 97 % (12/16/22 0803)   Vital Signs (24h Range):  Temp:  [97.5 °F (36.4 °C)-98.5 °F (36.9 °C)] 97.5 °F (36.4 °C)  Pulse:  [77-89] 89  Resp:  [18-20] 20  SpO2:  [95 %-99 %] 97 %  BP: (118-132)/(56-68) 126/65     Weight: 80 kg (176 lb 5.9 oz)  Body mass index is 27.62 kg/m².    Physical Exam  Constitutional:       General: She is not in acute distress.     Appearance: She is obese. She is ill-appearing.   HENT:      Head: Normocephalic and atraumatic.      Nose: Nose normal.      Mouth/Throat:      Mouth: Mucous membranes are moist.   Cardiovascular:      Rate  and Rhythm: Normal rate.   Pulmonary:      Effort: Pulmonary effort is normal. No respiratory distress.   Musculoskeletal:      Right lower leg: Edema present.      Left lower leg: Edema present.   Neurological:      Mental Status: She is lethargic, disoriented and confused.       Advance Care Planning   Advance Directives:   Living Will: Yes        Copy on chart: Yes    LaPOST: No    Do Not Resuscitate Status: Yes    Medical Power of : Yes        Oral Declaration: No    Agent's Name:  Isidro or Jody Jennings    Decision Making:  Family answered questions  Goals of Care: The family endorses that what is most important right now is to focus on avoiding the hospital, symptom/pain control, quality of life, even if it means sacrificing a little time, and improvement in condition but with limits to invasive therapies    Accordingly, we have decided that the best plan to meet the patient's goals includes NH placement with consideration of hospice at this time or in the future.        Significant Labs: All pertinent labs within the past 24 hours have been reviewed.  CBC:   Recent Labs   Lab 12/16/22 0451   WBC 11.74   HGB 11.2*   HCT 33.1*   MCV 90          BMP:  Recent Labs   Lab 12/16/22 0451   GLU 97      K 4.4   *   CO2 15*   BUN 18   CREATININE 0.7   CALCIUM 8.5*   MG 2.0       LFT:  Lab Results   Component Value Date    AST 42 (H) 12/16/2022    ALKPHOS 99 12/16/2022    BILITOT 0.3 12/16/2022     Albumin:   Albumin   Date Value Ref Range Status   12/16/2022 2.5 (L) 3.5 - 5.2 g/dL Final     Protein:   Total Protein   Date Value Ref Range Status   12/16/2022 6.3 6.0 - 8.4 g/dL Final     Lactic acid:   Lab Results   Component Value Date    LACTATE 1.6 12/13/2022    LACTATE 1.7 12/13/2022       Significant Imaging: I have reviewed all pertinent imaging results/findings within the past 24 hours.
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
## 2022-12-16 NOTE — ASSESSMENT & PLAN NOTE
Patient with Long standing persistent (>12 months) atrial fibrillation which is controlled currently with Beta Blocker and Flecainide. Patient is currently in atrial fibrillation.[id number] Score: 5.  Anticoagulation indicated. Anticoagulation done with Eliquis.  HR now at times tachycardic. Restarted B blocker.  Resume home eliquis

## 2022-12-17 NOTE — PLAN OF CARE
Problem: Infection  Goal: Absence of Infection Signs and Symptoms  Outcome: Ongoing, Progressing     Problem: Diabetes Comorbidity  Goal: Blood Glucose Level Within Targeted Range  Outcome: Ongoing, Progressing     Problem: Fluid and Electrolyte Imbalance (Acute Kidney Injury/Impairment)  Goal: Fluid and Electrolyte Balance  Outcome: Ongoing, Progressing     Problem: Renal Function Impairment (Acute Kidney Injury/Impairment)  Goal: Effective Renal Function  Outcome: Ongoing, Progressing

## 2022-12-17 NOTE — NURSING
Pt resting in bed asleep. No distress. No complaints. Scheduled medications given without difficulty. Causey to gravity for retention. IV saline lock. Accu checks no insulin coverage. Pt repositioned frequently with assist; wedge in place. Tele sitter remains at bedside. Tele #[id number]. Bed alarm set. Safety measures maintained. Will cont to monitor

## 2022-12-17 NOTE — ASSESSMENT & PLAN NOTE
Patient's FSGs are controlled on current medication regimen.  Last A1c reviewed-   Lab Results   Component Value Date    HGBA1C 5.8 (H) 12/13/2022     Most recent fingerstick glucose reviewed-   Recent Labs   Lab 12/16/22  1125 12/16/22  1558 12/16/22 2122 12/17/22  0713   POCTGLUCOSE 100 92 82 72     Current correctional scale  Low  Maintain anti-hyperglycemic dose as follows-   Antihyperglycemics (From admission, onward)    Start     Stop Route Frequency Ordered    12/14/22 1230  insulin aspart U-100 pen 0-5 Units         -- SubQ Before meals & nightly PRN 12/14/22 1130

## 2022-12-17 NOTE — ASSESSMENT & PLAN NOTE
Patient with Long standing persistent (>12 months) atrial fibrillation which is controlled currently with Beta Blocker and Flecainide. Patient is currently in atrial fibrillation.[id number] Score: 5.  Anticoagulation indicated. Anticoagulation done with Eliquis.  Restarted patient on low-dose beta-blocker and Eliquis

## 2022-12-17 NOTE — SUBJECTIVE & OBJECTIVE
Interval History: Seen at the bedside no event overnight       Review of Systems   Unable to perform ROS: Dementia   Objective:     Vital Signs (Most Recent):  Temp: 97.1 °F (36.2 °C) (12/17/22 0717)  Pulse: 94 (12/17/22 0717)  Resp: 17 (12/17/22 0717)  BP: (!) 93/50 (12/17/22 0717)  SpO2: 99 % (12/17/22 0717)   Vital Signs (24h Range):  Temp:  [96.1 °F (35.6 °C)-97.1 °F (36.2 °C)] 97.1 °F (36.2 °C)  Pulse:  [79-94] 94  Resp:  [17-20] 17  SpO2:  [98 %-99 %] 99 %  BP: ()/(50-80) 93/50     Weight: 80 kg (176 lb 5.9 oz)  Body mass index is 27.62 kg/m².    Intake/Output Summary (Last 24 hours) at 12/17/2022 1004  Last data filed at 12/17/2022 0825  Gross per 24 hour   Intake 480 ml   Output 600 ml   Net -120 ml      Physical Exam  Constitutional:       Appearance: She is obese. She is ill-appearing.   Cardiovascular:      Rate and Rhythm: Normal rate.      Pulses: Normal pulses.   Pulmonary:      Effort: Pulmonary effort is normal.   Abdominal:      General: Abdomen is flat.   Musculoskeletal:         General: Normal range of motion.   Skin:     General: Skin is warm.   Neurological:      Mental Status: She is alert. She is disoriented.   Psychiatric:         Mood and Affect: Mood normal.       Significant Labs: All pertinent labs within the past 24 hours have been reviewed.    Significant Imaging: I have reviewed all pertinent imaging results/findings within the past 24 hours.

## 2022-12-17 NOTE — ASSESSMENT & PLAN NOTE
Suspect this is due to medications. No evidence of sepsis currently. Does not appear volume overloaded. ACTH stim test negative. TTE normal.   - status post IV fluid resuscitation  - BP improving  - will continue monitor

## 2022-12-17 NOTE — ASSESSMENT & PLAN NOTE
She has baseline dementia, but seems pretty high functioning per sons at normal baseline. Family reporting that she was living in dependently until 1 week ago when she developed worsening confusion and hallucinations, which prompted admission to [facility].   - currently waxing and waning delirium.   - suspect some of this is medication induced, now worsened by hospitalization.  - MRI brain shows old strokes  - Psych consulted- hold all psych meds. Discontinued CEC.   - Neurology consulted  - PT, OT, Speech consulted   Repeat UA is slightly abnormal.  Doubt contributing, but start Rocephin ,cultures negative so far, will DC Rocephin in a.m.

## 2022-12-17 NOTE — PLAN OF CARE
Problem: Coping Ineffective  Goal: Effective Coping  Outcome: Ongoing, Not Progressing  Intervention: Support and Enhance Coping Strategies  Flowsheets (Taken 12/17/2022 2499)  Complementary Therapy: acupuncture performed  Supportive Measures: active listening utilized  Family/Support System Care: support provided  Environmental Support:   calm environment promoted   rest periods encouraged

## 2022-12-17 NOTE — PROGRESS NOTES
"[facility] Medicine  Progress Note    Patient Name: Tierra Jennings  MRN: [id number]  Patient Class: IP- Inpatient   Admission Date: 12/13/2022  Length of Stay: 4 days  Attending Physician: Arnoldo Juarez MD  Primary Care Provider: Misael Jasmine MD        Subjective:     Principal Problem:Shock, unspecified        HPI:  Ms. Jennings is an 89yo lady with a past medical history of DM2, HTN, CVA, dementia, CAD with NSTEMI, and PAfib.  She has no TTE on file here or in Care Everywhere.    She was recently admitted to [facility] on 12/9/22 after being taken to [facility] by her family due to hallucinations and delusions of, "being kidnapped."  She had a PEC placed that same day.  Her son states she was hallucinating people who were not there, such as teenagers running around her house.      I am not sure what occurred at [facility] today, but they did send a clinical sheet over stating, "BP 79/50, RR 14."  From her med list, it appears she had been started on only Risperdal 0.5mg po QHS.  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  She has continued on her Toprol XL and her Flecainide.          Overview/Hospital Course:  Ms Tierra Jennings was admitted with shock. Suspect this may be in part due to medication changes. No signs of infection. Troponin negative. TTE normal EF. ACTH stim test normal. Dr Rizvi discussed code status and ICU care with family- DNR, no vasopressors, no central lines. She improved with IVF alone. However, she is still altered. This is newer onset with confusion and hallucinations over the last week which was the cause of her admission to [facility]. " Neurology and Psychiatry consulted. CEC rescinded. Hypothermia has resolved. BP is improving. HR is improved but remains in Afib. PT, OT, Speech consulted. Stepped down to floor.         Interval History: Seen at the bedside no event overnight       Review of Systems   Unable to perform ROS: Dementia   Objective:     Vital Signs (Most Recent):  Temp: 97.1 °F (36.2 °C) (12/17/22 0717)  Pulse: 94 (12/17/22 0717)  Resp: 17 (12/17/22 0717)  BP: (!) 93/50 (12/17/22 0717)  SpO2: 99 % (12/17/22 0717)   Vital Signs (24h Range):  Temp:  [96.1 °F (35.6 °C)-97.1 °F (36.2 °C)] 97.1 °F (36.2 °C)  Pulse:  [79-94] 94  Resp:  [17-20] 17  SpO2:  [98 %-99 %] 99 %  BP: ()/(50-80) 93/50     Weight: 80 kg (176 lb 5.9 oz)  Body mass index is 27.62 kg/m².    Intake/Output Summary (Last 24 hours) at 12/17/2022 1004  Last data filed at 12/17/2022 0825  Gross per 24 hour   Intake 480 ml   Output 600 ml   Net -120 ml      Physical Exam  Constitutional:       Appearance: She is obese. She is ill-appearing.   Cardiovascular:      Rate and Rhythm: Normal rate.      Pulses: Normal pulses.   Pulmonary:      Effort: Pulmonary effort is normal.   Abdominal:      General: Abdomen is flat.   Musculoskeletal:         General: Normal range of motion.   Skin:     General: Skin is warm.   Neurological:      Mental Status: She is alert. She is disoriented.   Psychiatric:         Mood and Affect: Mood normal.       Significant Labs: All pertinent labs within the past 24 hours have been reviewed.    Significant Imaging: I have reviewed all pertinent imaging results/findings within the past 24 hours.      Assessment/Plan:      * Shock, unspecified  Suspect this is due to medications. No evidence of sepsis currently. Does not appear volume overloaded. ACTH stim test negative. TTE normal.   - status post IV fluid resuscitation  - BP improving  - will continue monitor       Hypomagnesemia  Replace as needed.        Acute encephalopathy  She has baseline  dementia, but seems pretty high functioning per sons at normal baseline. Family reporting that she was living in dependently until 1 week ago when she developed worsening confusion and hallucinations, which prompted admission to [facility].   - currently waxing and waning delirium.   - suspect some of this is medication induced, now worsened by hospitalization.  - MRI brain shows old strokes  - Psych consulted- hold all psych meds. Discontinued CEC.   - Neurology consulted  - PT, OT, Speech consulted   Repeat UA is slightly abnormal.  Doubt contributing, but start Rocephin ,cultures negative so far, will DC Rocephin in a.m.    DNR (do not resuscitate)  See ACP note at admission.      Coronary artery disease involving native coronary artery of native heart without angina pectoris  Trop negative x2  Doubt ACS  TTE normal EF  We will continue monitor      History of CVA (cerebrovascular accident)  CTH no acute changes; however, she does have newer mental status changes with hallucinations. MRI brain no acute changes. Shows stable encephalomalacia L occipital lobe and remote L basal ganglia lacunar infarct.     Persistent atrial fibrillation  Patient with Long standing persistent (>12 months) atrial fibrillation which is controlled currently with Beta Blocker and Flecainide. Patient is currently in atrial fibrillation.[id number] Score: 5.  Anticoagulation indicated. Anticoagulation done with Eliquis.  Restarted patient on low-dose beta-blocker and Eliquis        Type 2 diabetes mellitus without complication, without long-term current use of insulin  Patient's FSGs are controlled on current medication regimen.  Last A1c reviewed-   Lab Results   Component Value Date    HGBA1C 5.8 (H) 12/13/2022     Most recent fingerstick glucose reviewed-   Recent Labs   Lab 12/16/22  1125 12/16/22  1558 12/16/22  2122 12/17/22  0713   POCTGLUCOSE 100 92 82 72     Current correctional scale  Low  Maintain anti-hyperglycemic dose as follows-    Antihyperglycemics (From admission, onward)    Start     Stop Route Frequency Ordered    12/14/22 1230  insulin aspart U-100 pen 0-5 Units         -- SubQ Before meals & nightly PRN 12/14/22 1130            VTE Risk Mitigation (From admission, onward)         Ordered     apixaban tablet 2.5 mg  2 times daily         12/13/22 1145     IP VTE HIGH RISK PATIENT  Once         12/13/22 0431     Place sequential compression device  Until discontinued         12/13/22 0431     Place LEONCIO hose  Until discontinued         12/13/22 0431                Discharge Planning   JORGE:      Code Status: DNR   Is the patient medically ready for discharge?:     Reason for patient still in hospital (select all that apply): Patient unstable  Discharge Plan A: Skilled Nursing Facility   Discharge Delays: None known at this time              Arnoldo Juarez MD  Department of Hospital Medicine   [facility] - Med Surg

## 2022-12-18 NOTE — PLAN OF CARE
Problem: Coping Ineffective  Goal: Effective Coping  Outcome: Ongoing, Not Progressing  Intervention: Support and Enhance Coping Strategies  Flowsheets (Taken 12/18/2022 1500)  Supportive Measures: active listening utilized  Environmental Support: calm environment promoted

## 2022-12-18 NOTE — PROGRESS NOTES
"[facility] Medicine  Progress Note    Patient Name: Tierra Jennings  MRN: [id number]  Patient Class: IP- Inpatient   Admission Date: 12/13/2022  Length of Stay: 5 days  Attending Physician: Arnoldo Juarez MD  Primary Care Provider: Misael Jasmine MD        Subjective:     Principal Problem:Shock, unspecified        HPI:  Ms. Jennings is an 89yo lady with a past medical history of DM2, HTN, CVA, dementia, CAD with NSTEMI, and PAfib.  She has no TTE on file here or in Care Everywhere.    She was recently admitted to [facility] on 12/9/22 after being taken to [facility] by her family due to hallucinations and delusions of, "being kidnapped."  She had a PEC placed that same day.  Her son states she was hallucinating people who were not there, such as teenagers running around her house.      I am not sure what occurred at [place] today, but they did send a clinical sheet over stating, "BP 79/50, RR 14."  From her med list, it appears she had been started on only Risperdal 0.5mg po QHS.  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  She has continued on her Toprol XL and her Flecainide.          Overview/Hospital Course:  Ms Tierra Jennings was admitted with shock. Suspect this may be in part due to medication changes. No signs of infection. Troponin negative. TTE normal EF. ACTH stim test normal. Dr Rizvi discussed code status and ICU care with family- DNR, no vasopressors, no central lines. She improved with IVF alone. However, she is still altered. This is newer onset with confusion and hallucinations over the last week which was the cause of her admission to [facility]. " Neurology and Psychiatry consulted. CEC rescinded. Hypothermia has resolved. BP is improving. HR is improved but remains in Afib. PT, OT, Speech consulted. Stepped down to floor.         Interval History: Seen at the bedside no event overnight       Review of Systems   Unable to perform ROS: Mental status change   Objective:     Vital Signs (Most Recent):  Temp: 97.7 °F (36.5 °C) (12/18/22 0740)  Pulse: 77 (12/18/22 0740)  Resp: 18 (12/18/22 0740)  BP: 115/75 (12/18/22 0740)  SpO2: 98 % (12/18/22 0740) Vital Signs (24h Range):  Temp:  [97 °F (36.1 °C)-98.2 °F (36.8 °C)] 97.7 °F (36.5 °C)  Pulse:  [] 77  Resp:  [17-19] 18  SpO2:  [95 %-98 %] 98 %  BP: (115-180)/() 115/75     Weight: 80 kg (176 lb 5.9 oz)  Body mass index is 27.62 kg/m².    Intake/Output Summary (Last 24 hours) at 12/18/2022 1107  Last data filed at 12/18/2022 0539  Gross per 24 hour   Intake 480 ml   Output 750 ml   Net -270 ml      Physical Exam  Constitutional:       Appearance: She is ill-appearing.   Cardiovascular:      Rate and Rhythm: Normal rate.      Pulses: Normal pulses.   Pulmonary:      Effort: Pulmonary effort is normal.   Abdominal:      General: Abdomen is flat.   Musculoskeletal:         General: Normal range of motion.   Skin:     General: Skin is warm.   Neurological:      Mental Status: She is alert. She is disoriented.       Significant Labs: All pertinent labs within the past 24 hours have been reviewed.    Significant Imaging: I have reviewed all pertinent imaging results/findings within the past 24 hours.      Assessment/Plan:      * Shock, unspecified  Suspect this is due to medications. No evidence of sepsis currently. Does not appear volume overloaded. ACTH stim test negative. TTE normal.   - status post IV fluid resuscitation  - BP improving  - will continue monitor       Hypomagnesemia  Replace as needed.        Acute encephalopathy  She has baseline dementia, but seems pretty high functioning per sons at  normal baseline. Family reporting that she was living in dependently until 1 week ago when she developed worsening confusion and hallucinations, which prompted admission to [facility].   - currently waxing and waning delirium.   - suspect some of this is medication induced, now worsened by hospitalization.  - MRI brain shows old strokes  - Psych consulted- hold all psych meds. Discontinued CEC.   - Neurology consulted will FU   - PT, OT, Speech consulted will FU   Repeat UA is slightly abnormal.  Doubt contributing, but start Rocephin ,cultures negative so far, Dced Rocephin     DNR (do not resuscitate)  See ACP note at admission.      Coronary artery disease involving native coronary artery of native heart without angina pectoris  Trop negative x2  Doubt ACS  TTE normal EF  We will continue monitor      History of CVA (cerebrovascular accident)  CTH no acute changes; however, she does have newer mental status changes with hallucinations. MRI brain no acute changes. Shows stable encephalomalacia L occipital lobe and remote L basal ganglia lacunar infarct.     Persistent atrial fibrillation  Patient with Long standing persistent (>12 months) atrial fibrillation which is controlled currently with Beta Blocker and Flecainide. Patient is currently in atrial fibrillation.[id number] Score: 5.  Anticoagulation indicated. Anticoagulation done with Eliquis.  Restarted patient on low-dose beta-blocker and Eliquis        Type 2 diabetes mellitus without complication, without long-term current use of insulin  Patient's FSGs are controlled on current medication regimen.  Last A1c reviewed-   Lab Results   Component Value Date    HGBA1C 5.8 (H) 12/13/2022     Most recent fingerstick glucose reviewed-   Recent Labs   Lab 12/17/22  1206 12/17/22  1650 12/17/22  1948 12/18/22  0740   POCTGLUCOSE 75 103 133* 87     Current correctional scale  Low  Maintain anti-hyperglycemic dose as follows-   Antihyperglycemics (From admission, onward)       Start     Stop Route Frequency Ordered    12/14/22 1230  insulin aspart U-100 pen 0-5 Units         -- SubQ Before meals & nightly PRN 12/14/22 1130              VTE Risk Mitigation (From admission, onward)           Ordered     apixaban tablet 2.5 mg  2 times daily         12/13/22 1145     IP VTE HIGH RISK PATIENT  Once         12/13/22 0431     Place sequential compression device  Until discontinued         12/13/22 0431     Place LEONCIO hose  Until discontinued         12/13/22 0431                    Discharge Planning   JORGE:      Code Status: DNR   Is the patient medically ready for discharge?:     Reason for patient still in hospital (select all that apply): Patient unstable  Discharge Plan A: Skilled Nursing Facility   Discharge Delays: None known at this time              Arnoldo Juarez MD  Department of Hospital Medicine   [facility]

## 2022-12-18 NOTE — NURSING
Dressing changed to L heel and sacral area. Pt tolerated well. Safety measures maintained. Will cont to monitor

## 2022-12-18 NOTE — ASSESSMENT & PLAN NOTE
She has baseline dementia, but seems pretty high functioning per sons at normal baseline. Family reporting that she was living in dependently until 1 week ago when she developed worsening confusion and hallucinations, which prompted admission to [facility].   - currently waxing and waning delirium.   - suspect some of this is medication induced, now worsened by hospitalization.  - MRI brain shows old strokes  - Psych consulted- hold all psych meds. Discontinued CEC.   - Neurology consulted will FU   - PT, OT, Speech consulted will FU   Repeat UA is slightly abnormal.  Doubt contributing, but start Rocephin ,cultures negative so far, will DC Rocephin in a.m.

## 2022-12-18 NOTE — ASSESSMENT & PLAN NOTE
Patient with Long standing persistent (>12 months) atrial fibrillation which is controlled currently with Beta Blocker and Flecainide. Patient is currently in atrial fibrillation.[id number] Score: 5.  Anticoagulation indicated. Anticoagulation done with Eliquis.  Restarted patient on low-dose beta-blocker and Eliquis

## 2022-12-18 NOTE — ASSESSMENT & PLAN NOTE
Patient's FSGs are controlled on current medication regimen.  Last A1c reviewed-   Lab Results   Component Value Date    HGBA1C 5.8 (H) 12/13/2022     Most recent fingerstick glucose reviewed-   Recent Labs   Lab 12/17/22  1206 12/17/22  1650 12/17/22  1948 12/18/22  0740   POCTGLUCOSE 75 103 133* 87     Current correctional scale  Low  Maintain anti-hyperglycemic dose as follows-   Antihyperglycemics (From admission, onward)    Start     Stop Route Frequency Ordered    12/14/22 1230  insulin aspart U-100 pen 0-5 Units         -- SubQ Before meals & nightly PRN 12/14/22 1130

## 2022-12-18 NOTE — SUBJECTIVE & OBJECTIVE
Interval History: Seen at the bedside no event overnight       Review of Systems   Unable to perform ROS: Mental status change   Objective:     Vital Signs (Most Recent):  Temp: 97.7 °F (36.5 °C) (12/18/22 0740)  Pulse: 77 (12/18/22 0740)  Resp: 18 (12/18/22 0740)  BP: 115/75 (12/18/22 0740)  SpO2: 98 % (12/18/22 0740) Vital Signs (24h Range):  Temp:  [97 °F (36.1 °C)-98.2 °F (36.8 °C)] 97.7 °F (36.5 °C)  Pulse:  [] 77  Resp:  [17-19] 18  SpO2:  [95 %-98 %] 98 %  BP: (115-180)/() 115/75     Weight: 80 kg (176 lb 5.9 oz)  Body mass index is 27.62 kg/m².    Intake/Output Summary (Last 24 hours) at 12/18/2022 1107  Last data filed at 12/18/2022 0539  Gross per 24 hour   Intake 480 ml   Output 750 ml   Net -270 ml      Physical Exam  Constitutional:       Appearance: She is ill-appearing.   Cardiovascular:      Rate and Rhythm: Normal rate.      Pulses: Normal pulses.   Pulmonary:      Effort: Pulmonary effort is normal.   Abdominal:      General: Abdomen is flat.   Musculoskeletal:         General: Normal range of motion.   Skin:     General: Skin is warm.   Neurological:      Mental Status: She is alert. She is disoriented.       Significant Labs: All pertinent labs within the past 24 hours have been reviewed.    Significant Imaging: I have reviewed all pertinent imaging results/findings within the past 24 hours.

## 2022-12-19 NOTE — PROGRESS NOTES
"[facility] Medicine  Progress Note    Patient Name: Tierra Jennings  MRN: [id number]  Patient Class: IP- Inpatient   Admission Date: 12/13/2022  Length of Stay: 6 days  Attending Physician: Arnoldo Juarez MD  Primary Care Provider: Misael Jasmine MD        Subjective:     Principal Problem:Shock, unspecified        HPI:  Ms. Jennings is an 88yo lady with a past medical history of DM2, HTN, CVA, dementia, CAD with NSTEMI, and PAfib.  She has no TTE on file here or in Care Everywhere.    She was recently admitted to [facility] on 12/9/22 after being taken to [facility] by her family due to hallucinations and delusions of, "being kidnapped."  She had a PEC placed that same day.  Her son states she was hallucinating people who were not there, such as teenagers running around her house.      I am not sure what occurred at [facility] today, but they did send a clinical sheet over stating, "BP 79/50, RR 14."  From her med list, it appears she had been started on only Risperdal 0.5mg po QHS.  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  She has continued on her Toprol XL and her Flecainide.          Overview/Hospital Course:  Ms Tierra Jennnigs was admitted with shock. Suspect this may be in part due to medication changes. No signs of infection. Troponin negative. TTE normal EF. ACTH stim test normal. Dr Rizvi discussed code status and ICU care with family- DNR, no vasopressors, no central lines. She improved with IVF alone. However, she is still altered. This is newer onset with confusion and hallucinations over the last week which was the cause of her admission to [facility]. " Neurology and Psychiatry consulted. CEC rescinded. Hypothermia has resolved. BP is improving. HR is improved but remains in Afib. PT, OT, Speech consulted. Stepped down to floor.         Interval History: Seen at the bedside no event overnight   Was eating breakfast this morning    Review of Systems   Unable to perform ROS: Dementia   Objective:     Vital Signs (Most Recent):  Temp: 97.8 °F (36.6 °C) (12/19/22 0656)  Pulse: 74 (12/19/22 0739)  Resp: 18 (12/19/22 0739)  BP: (!) 147/80 (12/19/22 0656)  SpO2: 98 % (12/19/22 0739)   Vital Signs (24h Range):  Temp:  [97.6 °F (36.4 °C)-98 °F (36.7 °C)] 97.8 °F (36.6 °C)  Pulse:  [74-95] 74  Resp:  [18] 18  SpO2:  [98 %-100 %] 98 %  BP: (130-147)/(64-88) 147/80     Weight: 80 kg (176 lb 5.9 oz)  Body mass index is 27.62 kg/m².    Intake/Output Summary (Last 24 hours) at 12/19/2022 1053  Last data filed at 12/19/2022 0830  Gross per 24 hour   Intake 720 ml   Output 1350 ml   Net -630 ml      Physical Exam  Constitutional:       Appearance: She is ill-appearing.   Cardiovascular:      Rate and Rhythm: Normal rate.      Pulses: Normal pulses.   Pulmonary:      Effort: Pulmonary effort is normal.   Abdominal:      General: Abdomen is flat.   Musculoskeletal:         General: Normal range of motion.   Skin:     General: Skin is warm.   Neurological:      Mental Status: She is alert.       Significant Labs: All pertinent labs within the past 24 hours have been reviewed.    Significant Imaging: I have reviewed all pertinent imaging results/findings within the past 24 hours.      Assessment/Plan:      * Shock, unspecified  Suspect this is due to medications. No evidence of sepsis . Does not appear volume overloaded. ACTH stim test negative. TTE normal.   - status post IV fluid resuscitation  - BP improving  - will continue monitor       Hypomagnesemia  Replace as needed.        Acute encephalopathy  She has baseline dementia, but seems pretty high functioning per sons at normal  baseline. Family reporting that she was living in dependently until 1 week ago when she developed worsening confusion and hallucinations, which prompted admission to [facility].   - currently waxing and waning delirium.   - suspect some of this is medication induced, now worsened by hospitalization.  - MRI brain shows old strokes  - Psych consulted- hold all psych meds. Discontinued CEC.   - Neurology consulted will FU   - PT, OT, Speech consulted will FU   Repeat UA is slightly abnormal.  Doubt contributing, but start Rocephin ,cultures negative so far, will DC Rocephin in a.m.    DNR (do not resuscitate)  See ACP note at admission.      Coronary artery disease involving native coronary artery of native heart without angina pectoris  Trop negative x2  Doubt ACS  TTE normal EF  We will continue monitor      History of CVA (cerebrovascular accident)  CTH no acute changes; however, she does have newer mental status changes with hallucinations. MRI brain no acute changes. Shows stable encephalomalacia L occipital lobe and remote L basal ganglia lacunar infarct.     Persistent atrial fibrillation  Patient with Long standing persistent (>12 months) atrial fibrillation which is controlled currently with Beta Blocker and Flecainide. Patient is currently in atrial fibrillation.[id number] Score: 5.  Anticoagulation indicated. Anticoagulation done with Eliquis.  Restarted patient on low-dose beta-blocker and Eliquis        Type 2 diabetes mellitus without complication, without long-term current use of insulin  Patient's FSGs are controlled on current medication regimen.  Last A1c reviewed-   Lab Results   Component Value Date    HGBA1C 5.8 (H) 12/13/2022     Most recent fingerstick glucose reviewed-   Recent Labs   Lab 12/18/22  1129 12/18/22  1642 12/18/22  1952 12/19/22  0704   POCTGLUCOSE 87 119* 148* 95     Current correctional scale  Low  Maintain anti-hyperglycemic dose as follows-   Antihyperglycemics (From admission,  onward)    Start     Stop Route Frequency Ordered    12/14/22 1230  insulin aspart U-100 pen 0-5 Units         -- SubQ Before meals & nightly PRN 12/14/22 1130            VTE Risk Mitigation (From admission, onward)         Ordered     apixaban tablet 2.5 mg  2 times daily         12/13/22 1145     IP VTE HIGH RISK PATIENT  Once         12/13/22 0431     Place sequential compression device  Until discontinued         12/13/22 0431     Place LEONCIO hose  Until discontinued         12/13/22 0431                Discharge Planning   JORGE: 12/19/2022     Code Status: DNR   Is the patient medically ready for discharge?:     Reason for patient still in hospital (select all that apply): Patient unstable  Discharge Plan A: Skilled Nursing Facility   Discharge Delays: None known at this time              Arnoldo Juarez MD  Department of Hospital Medicine   [facility] [place] [facility]

## 2022-12-19 NOTE — ASSESSMENT & PLAN NOTE
She has baseline dementia, but seems pretty high functioning per sons at normal baseline. Family reporting that she was living in dependently until 1 week ago when she developed worsening confusion and hallucinations, which prompted admission to [facility].   - currently waxing and waning delirium.   - suspect some of this is medication induced, now worsened by hospitalization.  - MRI brain shows old strokes  - Psych consulted- hold all psych meds. Discontinued CEC.   - Neurology consulted will FU   - PT, OT, Speech consulted will FU   Repeat UA is slightly abnormal.  Doubt contributing, but start Rocephin ,cultures negative so far, will DC Rocephin in a.m.

## 2022-12-19 NOTE — PROGRESS NOTES
Follow Up    [phone], No unable to meet care needs. Called back to speak to Kaye in admissions, no answer, but left message.    [phone], admissions was in a meeting, Left message.

## 2022-12-19 NOTE — ASSESSMENT & PLAN NOTE
Patient's FSGs are controlled on current medication regimen.  Last A1c reviewed-   Lab Results   Component Value Date    HGBA1C 5.8 (H) 12/13/2022     Most recent fingerstick glucose reviewed-   Recent Labs   Lab 12/18/22  1129 12/18/22  1642 12/18/22  1952 12/19/22  0704   POCTGLUCOSE 87 119* 148* 95     Current correctional scale  Low  Maintain anti-hyperglycemic dose as follows-   Antihyperglycemics (From admission, onward)    Start     Stop Route Frequency Ordered    12/14/22 1230  insulin aspart U-100 pen 0-5 Units         -- SubQ Before meals & nightly PRN 12/14/22 1130

## 2022-12-19 NOTE — PLAN OF CARE
12/19/22 0830   Medicare Message   Important Message from Medicare regarding Discharge Appeal Rights Given to patient/caregiver;Explained to patient/caregiver;Signed/date by patient/caregiver;Other (comments)   Date IMM was signed 12/19/22   Time IMM was signed 0830     [facility] mail [phone]

## 2022-12-19 NOTE — PT/OT/SLP PROGRESS
Physical Therapy Treatment    Patient Name:  Tierra Jennings   MRN:  [id number]    Recommendations:     Discharge Recommendations: nursing facility, skilled  Discharge Equipment Recommendations:  (per SNF)  Barriers to discharge: None    Assessment:     Tierra Jennings is a 89 y.o. female admitted with a medical diagnosis of Shock, unspecified.  She presents with the following impairments/functional limitations: weakness, impaired endurance, gait instability, decreased upper extremity function, impaired self care skills, impaired balance, impaired functional mobility, impaired cognition, decreased safety awareness, decreased coordination, pain, decreased lower extremity function . Patient is able to follow commnads to complete therapy tasks.     Rehab Prognosis: Fair ; patient would benefit from acute skilled PT services to address these deficits and reach maximum level of function.    Recent Surgery: * No surgery found *      Plan:     During this hospitalization, patient to be seen  (3-5x/wk) to address the identified rehab impairments via gait training, therapeutic activities, therapeutic exercises, neuromuscular re-education, wheelchair management/training and progress toward the following goals:    Plan of Care Expires:  12/29/22    Subjective     Chief Complaint: fear of falling   Patient/Family Comments/goals: pt is agreeable to therapy , able to follow commands  to complete therapy tasks.   Pain/Comfort:  Location - Side 1: Right  Location 1: knee  Pain Addressed 1: Nurse notified, Reposition      Objective:     Communicated with nurse prior to session.  Patient found supine with pillow to offload L side  telemetry, peripheral IV, bed alarm, pressure relief boots, leslie catheter upon PT entry to room.     General Precautions: Standard, fall  Orthopedic Precautions: N/A  Braces: N/A  Respiratory Status: Room air     Functional Mobility:  Bed Mobility:     Rolling Left:  stand by assistance  Scooting:  contact guard assistance  Supine to Sit: contact guard assistance / minimum assistance HOB elevated, bedside rail   Sit to Supine: contact guard assistance and minimum assistance, bedside rail   Transfers:     Sit to Stand:  contact guard assistance from bed and minimum assistance x 2 to stand from low toilet seat with rolling walker.   Toilet Transfer: contact guard assistance with  rolling walker  using  Step Transfer  Gait: pt ambulated 15 ft x 2 trials with RW, CGA. Noted with decreased servando,decreased step length, no LOB. Pt with fears of falling, required encouragements to assure and for comfort. VC's for safety, proper technique and walker management.    Balance:  good in sitting, fair in standing.       AM-PAC 6 CLICK MOBILITY  Turning over in bed (including adjusting bedclothes, sheets and blankets)?: 3  Sitting down on and standing up from a chair with arms (e.g., wheelchair, bedside commode, etc.): 3  Moving from lying on back to sitting on the side of the bed?: 3  Moving to and from a bed to a chair (including a wheelchair)?: 3  Need to walk in hospital room?: 3  Climbing 3-5 steps with a railing?: 1  Basic Mobility Total Score: 16       Treatment & Education:  Lower Extremity Exercises.   Patient educated on the purpose of therapeutic exercise.    Patient verbalized acceptance/understanding of instructions, expectations, and limitations(for safety).  Patient performed: 20 reps (each) of B LE There Ex: AP, LAQ, Hip abd/add, Hip flexion while sitting up on EOB.       Patient required verbal cues/tactile cues to ensure correct sequence, to maintain proper form, and to allow for self-correction.    Patient left with bed in chair position with pillow to offload R side, pressure relief boots to B heels  all lines intact, call button in reach, bed alarm on, nurse Promise  notified, and OT and [employer] monitor present..    GOALS:   Multidisciplinary Problems       Physical Therapy Goals          Problem:  Physical Therapy    Goal Priority Disciplines Outcome Goal Variances Interventions   Physical Therapy Goal     PT, PT/OT Ongoing, Progressing     Description: Goals to be met by: 22     Patient will increase functional independence with mobility by performin. Supine to sit with Stand-by Assistance  2. Rolling to Left and Right with Stand-by Assistance.  3. Bed to chair transfer with Contact Guard Assistance    4. Wheelchair propulsion x25 feet with Stand-by Assistance   5. Gait with RW and Min A x 10-15'  6. Lower extremity exercise program x10 reps per handout, with supervision                         Time Tracking:     PT Received On: 22  PT Start Time: 1016     PT Stop Time: 1056  PT Total Time (min): 40 min     Billable Minutes: Gait Training 15, Therapeutic Activity 10, Therapeutic Exercise 15, and Total Time 40 min with OT  Co-treatment performed due to patient's multiple deficits requiring two skilled therapists to appropriately and safely assess patient's strength and endurance while facilitating functional tasks in addition to accommodating for patient's activity tolerance.    Treatment Type: Treatment  PT/PTA: PTA     PTA Visit Number: 1     2022

## 2022-12-19 NOTE — PT/OT/SLP PROGRESS
Occupational Therapy   Treatment    Name: Tierra Jennings  MRN: [id number]  Admitting Diagnosis:  Shock, unspecified       Recommendations:     Discharge Recommendations: nursing facility, skilled  Discharge Equipment Recommendations:  bedside commode, bath bench  Barriers to discharge:  pt not at PLOF     Assessment:     Tierra Jennings is a 89 y.o. female with a medical diagnosis of Shock, unspecified.  She presents with the following performance deficits affecting function :weakness, impaired functional mobility, impaired endurance, gait instability, impaired balance, impaired cognition, decreased coordination, decreased safety awareness, pain, decreased upper extremity function, decreased lower extremity function, impaired skin, decreased ROM, impaired self care skills, impaired sensation.     Rehab Prognosis:  Good; patient would benefit from acute skilled OT services to address these deficits and reach maximum level of function.       Plan:     Patient to be seen 3 x/week, 5 x/week to address the above listed problems via self-care/home management, therapeutic activities, therapeutic exercises  Plan of Care Expires: 12/23/22  Plan of Care Reviewed with: patient    Subjective   Pt agreeable to therapy.   Pain/Comfort:  Location - Side 1: Right  Location 1: knee  Not rated    Objective:     Communicated with: Nurse prior to session.  Patient found supine with telemetry, peripheral IV, bed alarm, pressure relief boots, leslie catheter upon OT entry to room.    General Precautions: Standard, fall (AMS)    Orthopedic Precautions:N/A  Braces: N/A  Respiratory Status: Room air     Occupational Performance:     Bed Mobility:    Patient completed Rolling/Turning to Left with  stand by assistance  Patient completed Scooting/Bridging with contact guard assistance  Patient completed Supine to Sit with contact guard assistance and minimum assistance  Patient completed Sit to Supine with contact guard assistance and  minimum assistance     Functional Mobility/Transfers:  Patient completed Sit <> Stand Transfer with contact guard assistance with RW from EOB and minimum assistance and of 2 persons  with  rolling walker from low toilet  Patient completed Toilet Transfer Step Transfer technique with contact guard assistance with  rolling walker  Functional Mobility: Pt performed in room functional mobility with RW, CGA for ADL's. Pt fearful required encouragement and reassurance during mobility.    Activities of Daily Living:  Grooming: stand by assistance seated to perform oral care, comb hair, wash hands, and wash face  Upper Body Dressing: minimum assistance to don back gown seated EOB  Lower Body Dressing: total assistance to don/doff socks  Toileting: set-up A seated on toilet for pericare        [facility] 6 Click ADL: 16      Treatment & Education:  Bed mobility, functional transfer/mobility , and ADL completed as noted above.   Pt educated on safety awareness with all OOB mobility and ADL .       Patient left HOB elevated with all lines intact, call button in reach, bed alarm on, and nurse notified    GOALS:   Multidisciplinary Problems       Occupational Therapy Goals          Problem: Occupational Therapy    Goal Priority Disciplines Outcome Interventions   Occupational Therapy Goal     OT, PT/OT Ongoing, Progressing    Description: Goals to be met by: 12/15/22     Patient will increase functional independence with ADLs by performing:    Feeding with Supervision.  UE Dressing with Moderate Assistance.  LE Dressing with Moderate Assistance.  Grooming while seated with Minimal Assistance.  Toilet transfer to bedside commode with Minimal Assistance.  Upper extremity exercise program x10 reps per handout, with assistance as needed.                         Time Tracking:     OT Date of Treatment: 12/19/22  OT Start Time: 1016  OT Stop Time: 1101  OT Total Time (min): 45 min    Billable Minutes:Self Care/Home Management 45  Co-tx  with PT    Co-treatment performed due to patient's multiple deficits requiring two skilled therapists to appropriately and safely assess patient's strength and endurance while facilitating functional tasks in addition to accommodating for patient's activity tolerance.        OT/AKIRA: AKIRA     AKIRA Visit Number: 2    12/19/2022

## 2022-12-19 NOTE — ASSESSMENT & PLAN NOTE
Patient with Long standing persistent (>12 months) atrial fibrillation which is controlled currently with Beta Blocker and Flecainide. Patient is currently in atrial fibrillation.[id number] Score: 5.  Anticoagulation indicated. Anticoagulation done with Eliquis.  Restarted patient on low-dose beta-blocker and Eliquis

## 2022-12-19 NOTE — PLAN OF CARE
Problem: Physical Therapy  Goal: Physical Therapy Goal  Description: Goals to be met by: 22     Patient will increase functional independence with mobility by performin. Supine to sit with Stand-by Assistance  2. Rolling to Left and Right with Stand-by Assistance.  3. Bed to chair transfer with Contact Guard Assistance    4. Wheelchair propulsion x25 feet with Stand-by Assistance   5. Gait with RW and Min A x 10-15'  6. Lower extremity exercise program x10 reps per handout, with supervision    Outcome: Ongoing, Progressing   Patient is able to follow commnads to complete therapy tasks.  Pt ambulated 15 ft x 2 trials with RW, CGA.

## 2022-12-19 NOTE — SUBJECTIVE & OBJECTIVE
Interval History: Seen at the bedside no event overnight   Was eating breakfast this morning    Review of Systems   Unable to perform ROS: Dementia   Objective:     Vital Signs (Most Recent):  Temp: 97.8 °F (36.6 °C) (12/19/22 0656)  Pulse: 74 (12/19/22 0739)  Resp: 18 (12/19/22 0739)  BP: (!) 147/80 (12/19/22 0656)  SpO2: 98 % (12/19/22 0739)   Vital Signs (24h Range):  Temp:  [97.6 °F (36.4 °C)-98 °F (36.7 °C)] 97.8 °F (36.6 °C)  Pulse:  [74-95] 74  Resp:  [18] 18  SpO2:  [98 %-100 %] 98 %  BP: (130-147)/(64-88) 147/80     Weight: 80 kg (176 lb 5.9 oz)  Body mass index is 27.62 kg/m².    Intake/Output Summary (Last 24 hours) at 12/19/2022 1053  Last data filed at 12/19/2022 0830  Gross per 24 hour   Intake 720 ml   Output 1350 ml   Net -630 ml      Physical Exam  Constitutional:       Appearance: She is ill-appearing.   Cardiovascular:      Rate and Rhythm: Normal rate.      Pulses: Normal pulses.   Pulmonary:      Effort: Pulmonary effort is normal.   Abdominal:      General: Abdomen is flat.   Musculoskeletal:         General: Normal range of motion.   Skin:     General: Skin is warm.   Neurological:      Mental Status: She is alert.       Significant Labs: All pertinent labs within the past 24 hours have been reviewed.    Significant Imaging: I have reviewed all pertinent imaging results/findings within the past 24 hours.

## 2022-12-19 NOTE — ASSESSMENT & PLAN NOTE
Suspect this is due to medications. No evidence of sepsis . Does not appear volume overloaded. ACTH stim test negative. TTE normal.   - status post IV fluid resuscitation  - BP improving  - will continue monitor

## 2022-12-20 NOTE — PLAN OF CARE
[facility]     Department of Hospital Medicine     [address] [place] [address]     [phone] after hours  [phone] fax       NURSING HOME ORDERS    12/20/2022    Admit to Nursing Home:  Skilled Bed                                                  Diagnoses:  Active Hospital Problems    Diagnosis  POA    *Shock, unspecified [R57.9]  Yes    Altered mental status [R41.82]  Yes    ACP (advance care planning) [Z71.89]  Not Applicable    Palliative care encounter [Z51.5]  Not Applicable    Hypomagnesemia [E83.42]  Yes    DNR (do not resuscitate) [Z66]  Yes    Acute encephalopathy [G93.40]  Yes    Coronary artery disease involving native coronary artery of native heart without angina pectoris [I25.10]  Yes    Persistent atrial fibrillation [I48.19]  Yes    History of CVA (cerebrovascular accident) [Z86.73]  Not Applicable    Type 2 diabetes mellitus without complication, without long-term current use of insulin [E11.9]  Yes      Resolved Hospital Problems    Diagnosis Date Resolved POA    Hypothermia [T68.XXXA] 12/15/2022 Yes    Bradycardia [R00.1] 12/15/2022 Yes    KIRSTIN (acute kidney injury) [N17.9] 12/15/2022 Yes       Patient is homebound due to:  Shock, unspecified    Allergies:  Review of patient's allergies indicates:   Allergen Reactions    Aspirin Other (See Comments)     Bleeding    Codeine     Erythromycin Rash       Vitals:       Routine, once monthly     Once weekly     Every shift (Skilled Nursing patients)    Diet: reguler        Acitivities:     - Up in a chair each morning as tolerated   - Ambulate with assistance to bathroom   - Scheduled walks once each shift (every 8 hours)   - May ambulate independently   - May use walker, cane, or self-propelled wheelchair   - Weight bearing: yes       Nursing Precautions:     - Aspiration precautions:             - Total assistance with meals            -  Upright 90 degrees befor during and after meals              -  Suction at bedside          - Fall precautions per nursing home protocol   - Seizure precaution per snf protocol   - Decubitus precautions:        -  for positioning   - Pressure reducing foam mattress   - Turn patient every two hours. Use wedge pillows to anchor patient    CONSULTS:      Physical Therapy to evaluate and treat     Occupational Therapy to evaluate and treat     Speech Therapy  to evaluate and treat     Nutrition to evaluate and recommend diet                         DIABETES CARE     Check blood sugar:      Fingerstick blood sugar a.m and p.m.    Fingerstick blood sugar AC and HS   Fingerstick blood sugar every 6 hours if unable to eat      Report CBG < 60 or > 400 to physician.                                          Insulin Sliding Scale          Glucose  Novolog Insulin Subcutaneous        0 - 60   Orange juice or glucose tablet, hold insulin      No insulin   201-250  2 units   251-300  4 units   301-350  6 units   351-400  8 units   >400   10 units then call physician      Medications: Discontinue all previous medication orders, if any. See new list below.       Medication List        ASK your doctor about these medications      amLODIPine 5 MG tablet  Commonly known as: NORVASC  Take 1 tablet (5 mg total) by mouth nightly.     * blood sugar diagnostic Strp  Commonly known as: ONETOUCH ULTRA TEST  1 strip by miscellaneous route 2 times daily. DX code 250.00     * blood sugar diagnostic Strp  To check BG 2 times daily, to use with insurance preferred meter     clotrimazole 1 % cream  Commonly known as: LOTRIMIN  Apply 1 ampule topically 3 (three) times daily.     flecainide 50 MG Tab  Commonly known as: TAMBOCOR  Take 1 tablet (50 mg total) by mouth every 12 (twelve) hours.     furosemide 40 MG tablet  Commonly known as: LASIX  Take 40 mg by mouth once daily.  Ask about: Which instructions should I use?     gabapentin 100 MG capsule  Commonly known as:  NEURONTIN  Take 1 capsule (100 mg total) by mouth once daily.     glipiZIDE 2.5 MG Tr24  Commonly known as: GLUCOTROL  Take 1 tablet (2.5 mg total) by mouth daily with breakfast.     hydrocortisone 2.5 % cream  Apply topically 2 (two) times daily.  Ask about: Which instructions should I use?     losartan 100 MG tablet  Commonly known as: COZAAR  Take 100 mg by mouth once daily.     metFORMIN 500 MG ER 24hr tablet  Commonly known as: GLUCOPHAGE-XR  Take 1 tablet (500 mg total) by mouth daily with breakfast.     metoprolol succinate 25 MG 24 hr tablet  Commonly known as: TOPROL-XL  Take 0.5 tablets (12.5 mg total) by mouth once daily.     mupirocin 2 % ointment  Commonly known as: BACTROBAN  Apply topically 3 (three) times daily.     pantoprazole 40 MG tablet  Commonly known as: PROTONIX  Take 1 tablet (40 mg total) by mouth once daily.     potassium chloride SA 10 MEQ tablet  Commonly known as: K-DUR,KLOR-CON M  Take 1 tablet (10 mEq total) by mouth once daily.     risperiDONE 0.5 MG Tab  Commonly known as: RISPERDAL  Take 0.5 mg by mouth every evening.     zinc oxide 16 % Oint ointment  Apply 1 ampule topically once daily.           * This list has 2 medication(s) that are the same as other medications prescribed for you. Read the directions carefully, and ask your doctor or other care provider to review them with you.                        _________________________________  Arnoldo Juarez MD  12/20/2022

## 2022-12-20 NOTE — PT/OT/SLP PROGRESS
Occupational Therapy   Treatment    Name: Tierra Jennings  MRN: [id number]  Admitting Diagnosis:  Shock, unspecified       Recommendations:     Discharge Recommendations: nursing facility, skilled  Discharge Equipment Recommendations:  bedside commode, bath bench  Barriers to discharge: none      Assessment:     Tierra Jennings is a 89 y.o. female with a medical diagnosis of Shock, unspecified.  She presents with the following performance deficits affecting function:  weakness, impaired self care skills, impaired balance, decreased coordination, decreased safety awareness, decreased ROM, decreased upper extremity function, decreased lower extremity function, gait instability, impaired cognition, impaired functional mobility, impaired endurance. Pt continues to make steady progress toward OT goals.    Rehab Prognosis:  Good; patient would benefit from acute skilled OT services to address these deficits and reach maximum level of function.       Plan:     Patient to be seen 3 x/week, 5 x/week to address the above listed problems via self-care/home management, therapeutic activities, therapeutic exercises  Plan of Care Expires: 12/23/22  Plan of Care Reviewed with: patient    Subjective   Pt agreeable to therapy.  Pain/Comfort:  Pain Rating 1: 0/10    Objective:     Communicated with: nurse Wood prior to session.  Patient found supine with telemetry, peripheral IV, pressure relief boots, bed alarm upon OT entry to room.    General Precautions: Standard, fall (AMS)    Orthopedic Precautions:N/A  Braces: N/A  Respiratory Status: Room air     Occupational Performance:     Bed Mobility:    Patient completed Rolling/Turning to Left with  stand by assistance  Patient completed Scooting/Bridging with contact guard assistance  Patient completed Supine to Sit with minimum assistance  Patient completed Sit to Supine with contact guard assistance     Functional Mobility/Transfers:  Patient completed Sit <> Stand Transfer  with contact guard assistance  with  rolling walker   Patient completed Toilet Transfer Step Transfer technique with contact guard assistance with  rolling walker  Functional Mobility: Pt able to perform functional mobility household distances within room with CGA, RW.     Activities of Daily Living:  Upper Body Dressing: minimum assistance to don back gown seated EOB  Lower Body Dressing: total assistance to don/doff socks  Toileting: pt able to perform posterior pericare in standing with CGA for balance during task      AMPA 6 Click ADL: 17    Treatment & Education:  Bed mobility, functional transfer/mobility , and ADL completed as noted above.   Pt educated on safety awareness with all OOB mobility and ADL .   Dynamic standing balance during ADL with CGA  Sitting balance EOB SBA    Patient left HOB elevated with all lines intact, call button in reach, bed alarm on, and nurse Fallyn notified    GOALS:   Multidisciplinary Problems       Occupational Therapy Goals          Problem: Occupational Therapy    Goal Priority Disciplines Outcome Interventions   Occupational Therapy Goal     OT, PT/OT Ongoing, Progressing    Description: Goals to be met by: 12/15/22     Patient will increase functional independence with ADLs by performing:    Feeding with Supervision.  UE Dressing with Moderate Assistance.  LE Dressing with Moderate Assistance.  Grooming while seated with Minimal Assistance.  Toilet transfer to bedside commode with Minimal Assistance.  Upper extremity exercise program x10 reps per handout, with assistance as needed.                         Time Tracking:     OT Date of Treatment: 12/20/22  OT Start Time: 1600  OT Stop Time: 1624  OT Total Time (min): 24 min    Billable Minutes:Self Care/Home Management 15  Therapeutic Activity 8  Co-tx with PT    OT/AKIRA: AKIRA CHAMPION Visit Number: 3    12/20/2022

## 2022-12-20 NOTE — PROGRESS NOTES
"[facility] Medicine  Progress Note    Patient Name: Tierra Jennings  MRN: [id number]  Patient Class: IP- Inpatient   Admission Date: 12/13/2022  Length of Stay: 7 days  Attending Physician: Arnoldo Juarez MD  Primary Care Provider: Misael Jasmine MD        Subjective:     Principal Problem:Shock, unspecified        HPI:  Ms. Jennings is an 89yo lady with a past medical history of DM2, HTN, CVA, dementia, CAD with NSTEMI, and PAfib.  She has no TTE on file here or in Care Everywhere.    She was recently admitted to [facility] on 12/9/22 after being taken to [facility] by her family due to hallucinations and delusions of, "being kidnapped."  She had a PEC placed that same day.  Her son states she was hallucinating people who were not there, such as teenagers running around her house.      I am not sure what occurred at [facility] today, but they did send a clinical sheet over stating, "BP 79/50, RR 14."  From her med list, it appears she had been started on only Risperdal 0.5mg po QHS.  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  She has continued on her Toprol XL and her Flecainide.          Overview/Hospital Course:  Ms Tierra Jennings was admitted with shock. Suspect this may be in part due to medication changes. No signs of infection. Troponin negative. TTE normal EF. ACTH stim test normal. Dr Rizvi discussed code status and ICU care with family- DNR, no vasopressors, no central lines. She improved with IVF alone. However, she is still altered. This is newer onset with confusion and hallucinations over the last week which was the cause of her admission to [facility]. " Neurology and Psychiatry consulted. CEC rescinded. Hypothermia has resolved. BP is improving. HR is improved but remains in Afib. PT, OT, Speech consulted. Stepped down to floor.         Interval History: Seen at the bedside no event overnight   Noted by the nurse low urine output, having had bowel movement for couple of days now    Review of Systems   Unable to perform ROS: Dementia   Objective:     Vital Signs (Most Recent):  Temp: 97.9 °F (36.6 °C) (12/20/22 0700)  Pulse: 70 (12/20/22 0700)  Resp: 16 (12/20/22 0700)  BP: (!) 145/77 (12/20/22 0700)  SpO2: 98 % (12/20/22 0700)   Vital Signs (24h Range):  Temp:  [96.3 °F (35.7 °C)-98.6 °F (37 °C)] 97.9 °F (36.6 °C)  Pulse:  [70-89] 70  Resp:  [14-18] 16  SpO2:  [95 %-99 %] 98 %  BP: (115-166)/(59-79) 145/77     Weight: 80 kg (176 lb 5.9 oz)  Body mass index is 27.62 kg/m².    Intake/Output Summary (Last 24 hours) at 12/20/2022 0955  Last data filed at 12/19/2022 2300  Gross per 24 hour   Intake 480 ml   Output 50 ml   Net 430 ml      Physical Exam  Constitutional:       Appearance: She is ill-appearing.   Cardiovascular:      Rate and Rhythm: Normal rate.      Pulses: Normal pulses.   Pulmonary:      Effort: Pulmonary effort is normal.   Abdominal:      General: Abdomen is flat.   Musculoskeletal:         General: Normal range of motion.   Neurological:      Mental Status: She is alert and oriented to person, place, and time.   Psychiatric:         Mood and Affect: Mood normal.       Significant Labs: All pertinent labs within the past 24 hours have been reviewed.    Significant Imaging: I have reviewed all pertinent imaging results/findings within the past 24 hours.      Assessment/Plan:      * Shock, unspecified  Suspect this is due to medications. No evidence of sepsis . Does not appear volume overloaded. ACTH stim test negative. TTE normal.   - status post IV fluid resuscitation  - BP improving  - will continue monitor       Hypomagnesemia  Replace as  needed.        Acute encephalopathy  She has baseline dementia, but seems pretty high functioning per sons at normal baseline. Family reporting that she was living in dependently until 1 week ago when she developed worsening confusion and hallucinations, which prompted admission to [facility].   - currently waxing and waning delirium.   - suspect some of this is medication induced, now worsened by hospitalization.  - MRI brain shows old strokes  - Psych consulted- hold all psych meds. Discontinued CEC.   - Neurology consulted will FU   - PT, OT, Speech consulted will FU   Repeat UA is slightly abnormal.  Doubt contributing, but start Rocephin ,cultures negative so far, DCed Rocephin    DNR (do not resuscitate)  See ACP note at admission.      Coronary artery disease involving native coronary artery of native heart without angina pectoris  Trop negative x2  Doubt ACS  TTE normal EF  We will continue monitor      History of CVA (cerebrovascular accident)  CTH no acute changes; however, she does have newer mental status changes with hallucinations. MRI brain no acute changes. Shows stable encephalomalacia L occipital lobe and remote L basal ganglia lacunar infarct.     Persistent atrial fibrillation  Patient with Long standing persistent (>12 months) atrial fibrillation which is controlled currently with Beta Blocker and Flecainide. Patient is currently in atrial fibrillation.[id number] Score: 5.  Anticoagulation indicated. Anticoagulation done with Eliquis.  Restarted patient on low-dose beta-blocker and Eliquis        Type 2 diabetes mellitus without complication, without long-term current use of insulin  Patient's FSGs are controlled on current medication regimen.  Last A1c reviewed-   Lab Results   Component Value Date    HGBA1C 5.8 (H) 12/13/2022     Most recent fingerstick glucose reviewed-   Recent Labs   Lab 12/19/22  1107 12/19/22  1600 12/19/22 2009 12/20/22  0655   POCTGLUCOSE 110 113* 155* 103     Current  correctional scale  Low  Maintain anti-hyperglycemic dose as follows-   Antihyperglycemics (From admission, onward)    Start     Stop Route Frequency Ordered    12/14/22 1230  insulin aspart U-100 pen 0-5 Units         -- SubQ Before meals & nightly PRN 12/14/22 1130            VTE Risk Mitigation (From admission, onward)         Ordered     apixaban tablet 2.5 mg  2 times daily         12/13/22 1145     IP VTE HIGH RISK PATIENT  Once         12/13/22 0431     Place sequential compression device  Until discontinued         12/13/22 0431     Place LEONCIO hose  Until discontinued         12/13/22 0431                Discharge Planning   JORGE: 12/19/2022     Code Status: DNR   Is the patient medically ready for discharge?:     Reason for patient still in hospital (select all that apply): Patient unstable  Discharge Plan A: Skilled Nursing Facility   Discharge Delays: None known at this time              Arnoldo Juarez MD  Department of Hospital Medicine   [facility] - Med Surg

## 2022-12-20 NOTE — ASSESSMENT & PLAN NOTE
Patient with Long standing persistent (>12 months) atrial fibrillation which is controlled currently with Beta Blocker and Flecainide. Patient is currently in atrial fibrillation.[id number] Score: 5.  Anticoagulation indicated. Anticoagulation done with Eliquis.  Restarted patient on low-dose beta-blocker and Eliquis

## 2022-12-20 NOTE — NURSING
Pt resting in bed watching tv. No complaints. Scheduled medications given. IV saline. Lock. Purewick in place. Tele #[id number]. Heel protector boots on. Dressing changed to L heel and sacral area. Pt tolerated well. Accu check; no insulin coverage. Safety measures maintained. Will cont to monitor

## 2022-12-20 NOTE — PLAN OF CARE
[facility]   12/20/22 1048   Post-Acute Status   Post-Acute Authorization Placement   Post-Acute Placement Status Set-up Complete/Auth obtained   Coverage Payor:  MEDICARE - MEDICARE PART A & B   Discharge Delays (!) Other  (Patient need a BM)   Discharge Plan   Discharge Plan A Skilled Nursing Facility   Discharge Plan B Skilled Nursing Facility;New Nursing Home placement - long term care facility

## 2022-12-20 NOTE — PT/OT/SLP PROGRESS
Physical Therapy Treatment    Patient Name:  Tierra Jennings   MRN:  [id number]    Recommendations:     Discharge Recommendations: nursing facility, skilled  Discharge Equipment Recommendations:  (per SNF)  Barriers to discharge: None    Assessment:     Tierra Jennings is a 89 y.o. female admitted with a medical diagnosis of Shock, unspecified.  She presents with the following impairments/functional limitations: weakness, impaired endurance, impaired self care skills, gait instability, impaired balance, decreased upper extremity function, decreased lower extremity function, decreased ROM, pain, decreased safety awareness, impaired functional mobility, impaired cognition .    Rehab Prognosis: Good; patient would benefit from acute skilled PT services to address these deficits and reach maximum level of function.    Recent Surgery: * No surgery found *      Plan:     During this hospitalization, patient to be seen  (3-5x/wk) to address the identified rehab impairments via gait training, therapeutic activities, therapeutic exercises, neuromuscular re-education, wheelchair management/training and progress toward the following goals:    Plan of Care Expires:  12/29/22    Subjective     Chief Complaint: weakness   Patient/Family Comments/goals: pt is pleasant and agreeable to therapy   Pain/Comfort:  Pain Rating 1: 0/10  Pain Rating Post-Intervention 1: 0/10      Objective:     Communicated with nurse  prior to session.  Patient found HOB elevated with telemetry, peripheral IV, pressure relief boots, bed alarm upon PT entry to room.     General Precautions: Standard, fall  Orthopedic Precautions: N/A  Braces: N/A  Respiratory Status: Room air     Functional Mobility:  Bed Mobility:     Rolling Left:  stand by assistance  Scooting: contact guard assistance  Supine to Sit: contact guard assistance / minimum assistance HOB elevated, bedside rail   Sit to Supine: contact guard assistance , bedside rail   Transfers:      Sit to Stand:  X 2 trials from bed , 1 trial from commode contact guard assistance to stand with rolling walker.   Toilet Transfer: contact guard assistance with  rolling walker  using  Step Transfer  Gait: pt ambulated 8 ft and 22 ft  with RW, CGA. Noted with decreased servando,decreased step length, no LOB. Pt with fears of falling, required encouragements to assure and for comfort. VC's for safety, proper technique and walker management.    Balance:  good in sitting, fair in standing.        AM-PAC 6 CLICK MOBILITY  Turning over in bed (including adjusting bedclothes, sheets and blankets)?: 4  Sitting down on and standing up from a chair with arms (e.g., wheelchair, bedside commode, etc.): 3  Moving from lying on back to sitting on the side of the bed?: 3  Moving to and from a bed to a chair (including a wheelchair)?: 3  Need to walk in hospital room?: 3  Climbing 3-5 steps with a railing?: 1  Basic Mobility Total Score: 17       Treatment & Education:  Lower Extremity Exercises.   Patient educated on the purpose of therapeutic exercise.    Patient verbalized acceptance/understanding of instructions, expectations, and limitations(for safety).  Patient performed: 10 reps (each) of B LE There Ex: AP, LAQ, HSC , Hip flexion while sitting up on EOB.       Patient required  verbal cues/tactile cues to ensure correct sequence, to maintain proper form, and to allow for self-correction.  Pt reported no complaints or problems with exercise activity.      Patient left HOB elevated with pressure relief boots to BLE  all lines intact, call button in reach, bed alarm on, and nurse Yumiko  notified..    GOALS:   Multidisciplinary Problems       Physical Therapy Goals          Problem: Physical Therapy    Goal Priority Disciplines Outcome Goal Variances Interventions   Physical Therapy Goal     PT, PT/OT Ongoing, Progressing     Description: Goals to be met by: 12/29/22     Patient will increase functional independence with  mobility by performin. Supine to sit with Stand-by Assistance  2. Rolling to Left and Right with Stand-by Assistance.  3. Bed to chair transfer with Contact Guard Assistance    4. Wheelchair propulsion x25 feet with Stand-by Assistance   5. Gait with RW and Min A x 10-15'  6. Lower extremity exercise program x10 reps per handout, with supervision                         Time Tracking:     PT Received On: 22  PT Start Time: 1600     PT Stop Time: 1624  PT Total Time (min): 24 min     Billable Minutes: Gait Training 14, Therapeutic Exercise 10, and Total Time 24 min with OT   Co-treatment performed due to patient's multiple deficits requiring two skilled therapists to appropriately and safely assess patient's strength and endurance while facilitating functional tasks in addition to accommodating for patient's activity tolerance.    Treatment Type: Treatment  PT/PTA: PTA     PTA Visit Number: 2     2022

## 2022-12-20 NOTE — PT/OT/SLP PROGRESS
Physical Therapy      Patient Name:  Tierar Jennings   MRN:  [id number]    Patient not seen today secondary to Other (pt is eating lunch). Second attempt , pt receiving enema .  Will follow-up as able later hour/day .

## 2022-12-20 NOTE — PLAN OF CARE
Problem: Adult Inpatient Plan of Care  Goal: Plan of Care Review  Outcome: Ongoing, Progressing  Flowsheets (Taken 12/20/2022 0815)  Plan of Care Reviewed With: patient  Goal: Patient-Specific Goal (Individualized)  Outcome: Ongoing, Progressing  Goal: Absence of Hospital-Acquired Illness or Injury  Outcome: Ongoing, Progressing  Intervention: Identify and Manage Fall Risk  Flowsheets (Taken 12/20/2022 0815)  Safety Promotion/Fall Prevention:   bed alarm set   Fall Risk reviewed with patient/family   lighting adjusted   medications reviewed   room near unit station   side rails raised x 2  Intervention: Prevent Skin Injury  Flowsheets (Taken 12/20/2022 0815)  Body Position: heels elevated  Skin Protection:   incontinence pads utilized   protective footwear used  Intervention: Prevent Infection  Flowsheets (Taken 12/20/2022 0815)  Infection Prevention:   environmental surveillance performed   hand hygiene promoted   single patient room provided     Problem: Adult Inpatient Plan of Care  Goal: Plan of Care Review  Outcome: Ongoing, Progressing  Flowsheets (Taken 12/20/2022 0815)  Plan of Care Reviewed With: patient     Problem: Adult Inpatient Plan of Care  Goal: Plan of Care Review  Outcome: Ongoing, Progressing  Flowsheets (Taken 12/20/2022 0815)  Plan of Care Reviewed With: patient     Problem: Adult Inpatient Plan of Care  Goal: Patient-Specific Goal (Individualized)  Outcome: Ongoing, Progressing     Problem: Adult Inpatient Plan of Care  Goal: Patient-Specific Goal (Individualized)  Outcome: Ongoing, Progressing     Problem: Adult Inpatient Plan of Care  Goal: Absence of Hospital-Acquired Illness or Injury  Outcome: Ongoing, Progressing  Intervention: Identify and Manage Fall Risk  Flowsheets (Taken 12/20/2022 0815)  Safety Promotion/Fall Prevention:   bed alarm set   Fall Risk reviewed with patient/family   lighting adjusted   medications reviewed   room near unit station   side rails raised x  2  Intervention: Prevent Skin Injury  Flowsheets (Taken 12/20/2022 0815)  Body Position: heels elevated  Skin Protection:   incontinence pads utilized   protective footwear used  Intervention: Prevent Infection  Flowsheets (Taken 12/20/2022 0815)  Infection Prevention:   environmental surveillance performed   hand hygiene promoted   single patient room provided     Problem: Adult Inpatient Plan of Care  Goal: Absence of Hospital-Acquired Illness or Injury  Outcome: Ongoing, Progressing     Problem: Adult Inpatient Plan of Care  Goal: Absence of Hospital-Acquired Illness or Injury  Intervention: Identify and Manage Fall Risk  Flowsheets (Taken 12/20/2022 0815)  Safety Promotion/Fall Prevention:   bed alarm set   Fall Risk reviewed with patient/family   lighting adjusted   medications reviewed   room near unit station   side rails raised x 2     Problem: Adult Inpatient Plan of Care  Goal: Absence of Hospital-Acquired Illness or Injury  Intervention: Identify and Manage Fall Risk  Flowsheets (Taken 12/20/2022 0815)  Safety Promotion/Fall Prevention:   bed alarm set   Fall Risk reviewed with patient/family   lighting adjusted   medications reviewed   room near unit station   side rails raised x 2     Problem: Adult Inpatient Plan of Care  Goal: Absence of Hospital-Acquired Illness or Injury  Intervention: Prevent Skin Injury  Flowsheets (Taken 12/20/2022 0815)  Body Position: heels elevated  Skin Protection:   incontinence pads utilized   protective footwear used     Problem: Adult Inpatient Plan of Care  Goal: Absence of Hospital-Acquired Illness or Injury  Intervention: Prevent Skin Injury  Flowsheets (Taken 12/20/2022 0815)  Body Position: heels elevated  Skin Protection:   incontinence pads utilized   protective footwear used     Problem: Adult Inpatient Plan of Care  Goal: Absence of Hospital-Acquired Illness or Injury  Intervention: Prevent Infection  Flowsheets (Taken 12/20/2022 0815)  Infection Prevention:    environmental surveillance performed   hand hygiene promoted   single patient room provided     Problem: Adult Inpatient Plan of Care  Goal: Absence of Hospital-Acquired Illness or Injury  Intervention: Prevent Infection  Flowsheets (Taken 12/20/2022 2915)  Infection Prevention:   environmental surveillance performed   hand hygiene promoted   single patient room provided

## 2022-12-20 NOTE — SUBJECTIVE & OBJECTIVE
Interval History: Seen at the bedside no event overnight   Noted by the nurse low urine output, having had bowel movement for couple of days now    Review of Systems   Unable to perform ROS: Dementia   Objective:     Vital Signs (Most Recent):  Temp: 97.9 °F (36.6 °C) (12/20/22 0700)  Pulse: 70 (12/20/22 0700)  Resp: 16 (12/20/22 0700)  BP: (!) 145/77 (12/20/22 0700)  SpO2: 98 % (12/20/22 0700)   Vital Signs (24h Range):  Temp:  [96.3 °F (35.7 °C)-98.6 °F (37 °C)] 97.9 °F (36.6 °C)  Pulse:  [70-89] 70  Resp:  [14-18] 16  SpO2:  [95 %-99 %] 98 %  BP: (115-166)/(59-79) 145/77     Weight: 80 kg (176 lb 5.9 oz)  Body mass index is 27.62 kg/m².    Intake/Output Summary (Last 24 hours) at 12/20/2022 0955  Last data filed at 12/19/2022 2300  Gross per 24 hour   Intake 480 ml   Output 50 ml   Net 430 ml      Physical Exam  Constitutional:       Appearance: She is ill-appearing.   Cardiovascular:      Rate and Rhythm: Normal rate.      Pulses: Normal pulses.   Pulmonary:      Effort: Pulmonary effort is normal.   Abdominal:      General: Abdomen is flat.   Musculoskeletal:         General: Normal range of motion.   Neurological:      Mental Status: She is alert and oriented to person, place, and time.   Psychiatric:         Mood and Affect: Mood normal.       Significant Labs: All pertinent labs within the past 24 hours have been reviewed.    Significant Imaging: I have reviewed all pertinent imaging results/findings within the past 24 hours.

## 2022-12-20 NOTE — ASSESSMENT & PLAN NOTE
Patient's FSGs are controlled on current medication regimen.  Last A1c reviewed-   Lab Results   Component Value Date    HGBA1C 5.8 (H) 12/13/2022     Most recent fingerstick glucose reviewed-   Recent Labs   Lab 12/19/22  1107 12/19/22  1600 12/19/22 2009 12/20/22  0655   POCTGLUCOSE 110 113* 155* 103     Current correctional scale  Low  Maintain anti-hyperglycemic dose as follows-   Antihyperglycemics (From admission, onward)    Start     Stop Route Frequency Ordered    12/14/22 1230  insulin aspart U-100 pen 0-5 Units         -- SubQ Before meals & nightly PRN 12/14/22 1130

## 2022-12-20 NOTE — ASSESSMENT & PLAN NOTE
She has baseline dementia, but seems pretty high functioning per sons at normal baseline. Family reporting that she was living in dependently until 1 week ago when she developed worsening confusion and hallucinations, which prompted admission to [facility].   - currently waxing and waning delirium.   - suspect some of this is medication induced, now worsened by hospitalization.  - MRI brain shows old strokes  - Psych consulted- hold all psych meds. Discontinued CEC.   - Neurology consulted will FU   - PT, OT, Speech consulted will FU   Repeat UA is slightly abnormal.  Doubt contributing, but start Rocephin ,cultures negative so far, DCed Rocephin

## 2022-12-20 NOTE — PLAN OF CARE
Problem: Physical Therapy  Goal: Physical Therapy Goal  Description: Goals to be met by: 22     Patient will increase functional independence with mobility by performin. Supine to sit with Stand-by Assistance  2. Rolling to Left and Right with Stand-by Assistance.  3. Bed to chair transfer with Contact Guard Assistance    4. Wheelchair propulsion x25 feet with Stand-by Assistance   5. Gait with RW and Min A x 10-15'  6. Lower extremity exercise program x10 reps per handout, with supervision    Outcome: Ongoing, Progressing

## 2022-12-21 NOTE — PLAN OF CARE
Problem: Adult Inpatient Plan of Care  Goal: Plan of Care Review  Outcome: Met  Goal: Patient-Specific Goal (Individualized)  Outcome: Met  Goal: Absence of Hospital-Acquired Illness or Injury  Outcome: Met  Goal: Optimal Comfort and Wellbeing  Outcome: Met  Goal: Readiness for Transition of Care  Outcome: Met     Problem: Infection  Goal: Absence of Infection Signs and Symptoms  Outcome: Met     Problem: Diabetes Comorbidity  Goal: Blood Glucose Level Within Targeted Range  Outcome: Met     Problem: Fluid and Electrolyte Imbalance (Acute Kidney Injury/Impairment)  Goal: Fluid and Electrolyte Balance  Outcome: Met     Problem: Oral Intake Inadequate (Acute Kidney Injury/Impairment)  Goal: Optimal Nutrition Intake  Outcome: Met     Problem: Renal Function Impairment (Acute Kidney Injury/Impairment)  Goal: Effective Renal Function  Outcome: Met     Problem: Impaired Wound Healing  Goal: Optimal Wound Healing  Outcome: Met     Problem: Fall Injury Risk  Goal: Absence of Fall and Fall-Related Injury  Outcome: Met     Problem: Skin Injury Risk Increased  Goal: Skin Health and Integrity  Outcome: Met     Problem: Coping Ineffective  Goal: Effective Coping  Outcome: Met

## 2022-12-21 NOTE — PT/OT/SLP PROGRESS
Occupational Therapy   Treatment    Name: Tierra Jennings  MRN: [id number]  Admitting Diagnosis:  Shock, unspecified       Recommendations:     Discharge Recommendations: nursing facility, skilled  Discharge Equipment Recommendations:   (TBD at SNF)  Barriers to discharge:  None    Assessment:     Tierra Jennings is a 89 y.o. female with a medical diagnosis of Shock, unspecified.   Performance deficits affecting function are weakness, impaired endurance, impaired sensation, impaired self care skills, impaired functional mobility, gait instability, impaired balance, decreased coordination, decreased upper extremity function, decreased lower extremity function, decreased safety awareness, pain, decreased ROM, impaired skin, edema, impaired cardiopulmonary response to activity.     Rehab Prognosis:  Good; patient would benefit from acute skilled OT services to address these deficits and reach maximum level of function.       Plan:     Patient to be seen 3 x/week, 5 x/week to address the above listed problems via self-care/home management, therapeutic activities, therapeutic exercises  Plan of Care Expires: 12/23/22  Plan of Care Reviewed with: patient    Subjective     Pain/Comfort:  Pain Rating 1: 0/10    Objective:     Communicated with: nurse prior to session.  Patient found left sidelying with telemetry, peripheral IV, pressure relief boots, bed alarm, PureWick upon OT entry to room.    General Precautions: Standard, fall    Orthopedic Precautions:N/A  Braces: N/A  Respiratory Status: Room air     Occupational Performance:     Bed Mobility:    Patient completed Scooting/Bridging with stand by assistance  Patient completed Supine to Sit with stand by assistance, with side rail, and HOB elevated and increased time  Patient completed Sit to Supine with contact guard assistance and with leg lift     Functional Mobility/Transfers:  Patient completed Sit <> Stand Transfer with contact guard assistance  with  rolling  walker   Functional Mobility: The patient amb using a RW with CGA/SBA (see PT note)    Activities of Daily Living:  Grooming: modified independence to comb the front of her hair while seated on the EOB  Upper Body Dressing: moderate assistance and maximal assistance to don/doff back gown  Lower Body Dressing: total assistance to don/doff B socks      AMPAC 6 Click ADL: 17    Treatment & Education:  Participated in self care and functional mobility as noted above  Educated the patient re: pressure relief in bed with use of wedge and pressure relief boots.    Patient left HOB elevated with all lines intact, call button in reach, and bed alarm on    GOALS:   Multidisciplinary Problems       Occupational Therapy Goals          Problem: Occupational Therapy    Goal Priority Disciplines Outcome Interventions   Occupational Therapy Goal     OT, PT/OT Ongoing, Progressing    Description: Goals to be met by: 12/15/22     Patient will increase functional independence with ADLs by performing:    Feeding with Supervision.  UE Dressing with Moderate Assistance.  LE Dressing with Moderate Assistance.  Grooming while seated with Minimal Assistance.  Toilet transfer to bedside commode with Minimal Assistance.  Upper extremity exercise program x10 reps per handout, with assistance as needed.                         Time Tracking:     OT Date of Treatment: 12/21/22  OT Start Time: 0913  OT Stop Time: 0940  OT Total Time (min): 27 min    Billable Minutes:Self Care/Home Management 17  Therapeutic Activity 10  Total Time 27 (with PT)    OT/AKIRA: OT     AKIRA Visit Number: 3    12/21/2022

## 2022-12-21 NOTE — PLAN OF CARE
ADT 30 order placed for Stretcher Transportation.  Requested  time:  If transportation does not arrive at ETA time nurse will be instructed to follow protocol for transportation below:[facility] [id number]:[facility] [address] [facility]; [place] [address] phone [phone] [address]  How can I get in touch directly with dispatch, if needed?                 Non-emergent (stretcher): [phone]      +NURSING:  If Stretcher does not arrive at requested time please call the above Non Emergent Dispatcher.  If issue not resolved please escalate to your charge nurse for further instructions.

## 2022-12-21 NOTE — PLAN OF CARE
[facility] - Med Surg  Discharge Final Note  TN requested per secure chat that, Dr. Juarez please put in discharge.    Transportation requested for 12 noon.  TN sent a secure chat to med surg nurse Mita please call report to [phone] ask for [facility] nurse 600.  Primary Care Provider: Misael Jasmine MD    Expected Discharge Date: 12/19/2022    Final Discharge Note (most recent)       Final Note - 12/21/22 1057          Final Note    Assessment Type Final Discharge Note     Anticipated Discharge Disposition Skilled Nursing Facility     What phone number can be called within the next 1-3 days to see how you are doing after discharge? --   see chart    Hospital Resources/Appts/Education Provided Appointments scheduled and added to AVS;Appointments scheduled by Navigator/Coordinator;Provided patient/caregiver with written discharge plan information        Post-Acute Status    Coverage Medicare A&B     Patient choice form signed by patient/caregiver List with quality metrics by geographic area provided     Discharge Delays None known at this time                     Important Message from Medicare  Important Message from Medicare regarding Discharge Appeal Rights: Given to patient/caregiver, Explained to patient/caregiver, Signed/date by patient/caregiver, Other (comments)     Date IMM was signed: 12/19/22  Time IMM was signed: 0830    Contact Info       [facility]    [address] [place] [address]       Next Steps: Follow up    Instructions: SNF

## 2022-12-21 NOTE — PLAN OF CARE
Problem: Adult Inpatient Plan of Care  Goal: Plan of Care Review  Outcome: Ongoing, Progressing  Goal: Absence of Hospital-Acquired Illness or Injury  Outcome: Ongoing, Progressing  Goal: Optimal Comfort and Wellbeing  Outcome: Ongoing, Progressing     Problem: Infection  Goal: Absence of Infection Signs and Symptoms  Outcome: Ongoing, Progressing     Problem: Diabetes Comorbidity  Goal: Blood Glucose Level Within Targeted Range  Outcome: Ongoing, Progressing     Problem: Fall Injury Risk  Goal: Absence of Fall and Fall-Related Injury  Outcome: Ongoing, Progressing     Problem: Impaired Wound Healing  Goal: Optimal Wound Healing  Outcome: Ongoing, Progressing     Problem: Skin Injury Risk Increased  Goal: Skin Health and Integrity  Outcome: Ongoing, Progressing

## 2022-12-21 NOTE — NURSING
Patient resting in bed, respirations e/u, no family at bedside. Pt repositioned, administered meds with no issues. Reinforced use of call light, CLWR, BR upx3, bed low, locked in position, bed alarm engaged and telesitter noted. No s/s of pain nor distress, safety maintained, will continue to monitor.

## 2022-12-21 NOTE — DISCHARGE SUMMARY
"[facility] Medicine  Discharge Summary      Patient Name: Tierra Jennings  MRN: [id number]  UMESH: [id number]  Patient Class: IP- Inpatient  Admission Date: 12/13/2022  Hospital Length of Stay: 8 days  Discharge Date and Time: No discharge date for patient encounter.  Attending Physician: Arnoldo Juarez MD   Discharging Provider: Arnoldo Juarez MD  Primary Care Provider: Misael Jasmine MD    Primary Care Team: Networked reference to record PCT     HPI:   Ms. Jennings is an 88yo lady with a past medical history of DM2, HTN, CVA, dementia, CAD with NSTEMI, and PAfib.  She has no TTE on file here or in Care Everywhere.    She was recently admitted to [facility] on 12/9/22 after being taken to [facility] by her family due to hallucinations and delusions of, "being kidnapped."  She had a PEC placed that same day.  Her son states she was hallucinating people who were not there, such as teenagers running around her house.      I am not sure what occurred at [facility] today, but they did send a clinical sheet over stating, "BP 79/50, RR 14."  From her med list, it appears she had been started on only Risperdal 0.5mg po QHS.  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  She has continued on her Toprol XL and her Flecainide.          * No surgery found *      Hospital Course:   Ms Tierra Jennings was admitted with shock. Suspect this may be in part due to medication changes. No signs of infection. Troponin negative. TTE normal EF. ACTH stim test normal. Dr Rizvi discussed code status and ICU care with family- DNR, no vasopressors, no central lines. She improved with IVF alone. " However, she is still altered. This is newer onset with confusion and hallucinations over the last week which was the cause of her admission to [facility]. Neurology and Psychiatry consulted. CEC rescinded. Hypothermia has resolved. BP is improving. HR is improved but remains in Afib. PT, OT, Speech consulted. Stepped down to floor. On the floor she was stable labs was ok ,DC to SNF          Goals of Care Treatment Preferences:  Code Status: DNR    Health care agent: Isidro Jennings  Health care agent number: No value filed.    Living Will: Yes  LaPOST: Yes  What is most important right now is to focus on avoiding the hospital, symptom/pain control, quality of life, even if it means sacrificing a little time, improvement in condition but with limits to invasive therapies.      Consults:   Consults (From admission, onward)        Status Ordering Provider     IP consult to case management  Once        Provider:  (Not yet assigned)    Completed QING MEJIA     Inpatient consult to Neurology  Once        Provider:  Riccardo Granger MD    Completed QING MEJIA     Inpatient consult to Palliative Care  Once        Provider:  (Not yet assigned)    Completed QING MEJIA     Inpatient consult to Spiritual Care  Once        Provider:  (Not yet assigned)    Completed QING MEJIA     Inpatient consult to Psychiatry  Once        Provider:  Basim Mcneill MD    Completed QING MEJIA     Inpatient consult to Cardiology  Once        Provider:  Erika Joy MD    Completed GERALD CAO          No new Assessment & Plan notes have been filed under this hospital service since the last note was generated.  Service: Hospital Medicine    Final Active Diagnoses:    Diagnosis Date Noted POA    PRINCIPAL PROBLEM:  Shock, unspecified [R57.9] 12/13/2022 Yes    Altered mental status [R41.82] 12/16/2022 Yes    ACP (advance care planning) [Z71.89] 12/16/2022 Not Applicable    Palliative care  encounter [Z51.5] 12/16/2022 Not Applicable    Hypomagnesemia [E83.42] 12/14/2022 Yes    DNR (do not resuscitate) [Z66] 12/13/2022 Yes    Acute encephalopathy [G93.40] 12/13/2022 Yes    Coronary artery disease involving native coronary artery of native heart without angina pectoris [I25.10] 09/05/2021 Yes    Persistent atrial fibrillation [I48.19] 11/06/2020 Yes    History of CVA (cerebrovascular accident) [Z86.73] 11/06/2020 Not Applicable    Type 2 diabetes mellitus without complication, without long-term current use of insulin [E11.9] 10/01/2013 Yes      Problems Resolved During this Admission:    Diagnosis Date Noted Date Resolved POA    Hypothermia [T68.XXXA] 12/13/2022 12/15/2022 Yes    Bradycardia [R00.1] 12/13/2022 12/15/2022 Yes    KIRSTIN (acute kidney injury) [N17.9] 09/05/2021 12/15/2022 Yes       Discharged Condition: good    Disposition:     Follow Up:   Follow-up Information     [facility] Follow up.    Why: [facility]  Contact information:  [address] [place] [address]                     Patient Instructions:   No discharge procedures on file.    Significant Diagnostic Studies: Labs:   BMP:   Recent Labs   Lab 12/20/22  1054   *      K 5.4*   *   CO2 17*   BUN 11   CREATININE 0.7   CALCIUM 8.2*    and CBC   Recent Labs   Lab 12/20/22  1054   WBC 10.11   HGB 11.2*   HCT 32.7*          Pending Diagnostic Studies:     None         Medications:  Reconciled Home Medications:      Medication List      CHANGE how you take these medications    * blood sugar diagnostic Strp  Commonly known as: ONETOUCH ULTRA TEST  1 strip by miscellaneous route 2 times daily. DX code 250.00  What changed:   · how much to take  · how to take this  · when to take this     * blood sugar diagnostic Strp  To check BG 2 times daily, to use with insurance preferred meter  What changed: Another medication with the same name was changed. Make sure you understand how and  when to take each.     metoprolol succinate 25 MG 24 hr tablet  Commonly known as: TOPROL-XL  Take 0.5 tablets (12.5 mg total) by mouth once daily.  What changed: how much to take         * This list has 2 medication(s) that are the same as other medications prescribed for you. Read the directions carefully, and ask your doctor or other care provider to review them with you.            CONTINUE taking these medications    amLODIPine 5 MG tablet  Commonly known as: NORVASC  Take 1 tablet (5 mg total) by mouth nightly.     clotrimazole 1 % cream  Commonly known as: LOTRIMIN  Apply 1 ampule topically 3 (three) times daily.     flecainide 50 MG Tab  Commonly known as: TAMBOCOR  Take 1 tablet (50 mg total) by mouth every 12 (twelve) hours.     furosemide 40 MG tablet  Commonly known as: LASIX  Take 40 mg by mouth once daily.     gabapentin 100 MG capsule  Commonly known as: NEURONTIN  Take 1 capsule (100 mg total) by mouth once daily.     glipiZIDE 2.5 MG Tr24  Commonly known as: GLUCOTROL  Take 1 tablet (2.5 mg total) by mouth daily with breakfast.     hydrocortisone 2.5 % cream  Apply topically 2 (two) times daily.     losartan 100 MG tablet  Commonly known as: COZAAR  Take 100 mg by mouth once daily.     metFORMIN 500 MG ER 24hr tablet  Commonly known as: GLUCOPHAGE-XR  Take 1 tablet (500 mg total) by mouth daily with breakfast.     mupirocin 2 % ointment  Commonly known as: BACTROBAN  Apply topically 3 (three) times daily.     pantoprazole 40 MG tablet  Commonly known as: PROTONIX  Take 1 tablet (40 mg total) by mouth once daily.     potassium chloride SA 10 MEQ tablet  Commonly known as: K-DUR,KLOR-CON M  Take 1 tablet (10 mEq total) by mouth once daily.     risperiDONE 0.5 MG Tab  Commonly known as: RISPERDAL  Take 0.5 mg by mouth every evening.     zinc oxide 16 % Oint ointment  Apply 1 ampule topically once daily.            Indwelling Lines/Drains at time of discharge:   Lines/Drains/Airways     Drain  Duration            Female External Urinary Catheter 12/20/22 0800 1 day                Time spent on the discharge of patient: 48 minutes         Arnoldo Juarez MD  Department of Hospital Medicine  [facility] Surg

## 2022-12-21 NOTE — PT/OT/SLP PROGRESS
Physical Therapy Treatment    Patient Name:  Tierra Jennings   MRN:  [id number]    Recommendations:     Discharge Recommendations: nursing facility, skilled  Discharge Equipment Recommendations:  (per SNF)  Barriers to discharge: None    Assessment:     Tierra Jennings is a 89 y.o. female admitted with a medical diagnosis of Shock, unspecified.  She presents with the following impairments/functional limitations: weakness, impaired endurance, impaired self care skills, gait instability, impaired balance, decreased lower extremity function, decreased ROM, pain, impaired functional mobility, decreased safety awareness, impaired cognition, decreased coordination .    Rehab Prognosis: Good; patient would benefit from acute skilled PT services to address these deficits and reach maximum level of function.    Recent Surgery: * No surgery found *      Plan:     During this hospitalization, patient to be seen  (3-5x/wk) to address the identified rehab impairments via gait training, therapeutic activities, therapeutic exercises, neuromuscular re-education, wheelchair management/training and progress toward the following goals:    Plan of Care Expires:  12/29/22    Subjective     Chief Complaint: weakness , fear of falling   Patient/Family Comments/goals: pt is agreeable to therapy   Pain/Comfort:  Pain Rating 1: 0/10  Pain Rating Post-Intervention 1: 0/10      Objective:     Communicated with nurse Fan prior to session.  Patient found right sidelying, wedge to offload L side  with telemetry, bed alarm, pressure relief boots, PureWick upon PT entry to room.     General Precautions: Standard, fall  Orthopedic Precautions: N/A  Braces: N/A  Respiratory Status: Room air     Functional Mobility:  Bed Mobility:     Rolling Left:  stand by assistance  Scooting: contact guard assistance  Supine to Sit: contact guard assistance / minimum assistance HOB elevated, bedside rail   Sit to Supine: contact guard assistance , bedside  rail   Transfers:     Sit to Stand: from bed SBA  with rolling walker.   Gait: pt ambulated 30 ft x 2 trials with RW, CGA. Noted with decreased servando,decreased step length, no LOB. Pt with fears of falling, required encouragements to assure and for comfort. VC's for safety, proper technique and walker management.    Balance:  good in sitting, fair in standing.          AM-PAC 6 CLICK MOBILITY  Turning over in bed (including adjusting bedclothes, sheets and blankets)?: 4  Sitting down on and standing up from a chair with arms (e.g., wheelchair, bedside commode, etc.): 3  Moving from lying on back to sitting on the side of the bed?: 3  Moving to and from a bed to a chair (including a wheelchair)?: 3  Need to walk in hospital room?: 3  Climbing 3-5 steps with a railing?: 1  Basic Mobility Total Score: 17       Treatment & Education:  Instructed pt to perform BLE AP, QS ,GS while in bed throughout the day, pt verbalized understanding.   Patient left HOB elevated with pressure relief boots to BLE all lines intact, call button in reach, bed alarm on, nurse notified, and avasys present..    GOALS:   Multidisciplinary Problems       Physical Therapy Goals          Problem: Physical Therapy    Goal Priority Disciplines Outcome Goal Variances Interventions   Physical Therapy Goal     PT, PT/OT Ongoing, Progressing     Description: Goals to be met by: 22     Patient will increase functional independence with mobility by performin. Supine to sit with Stand-by Assistance  2. Rolling to Left and Right with Stand-by Assistance.  3. Bed to chair transfer with Contact Guard Assistance    4. Wheelchair propulsion x25 feet with Stand-by Assistance   5. Gait with RW and Min A x 10-15'  6. Lower extremity exercise program x10 reps per handout, with supervision                         Time Tracking:     PT Received On: 22  PT Start Time: 913     PT Stop Time: 940  PT Total Time (min): 27 min     Billable Minutes:  Gait Training 27 and Total Time 27 min with OT  Co-treatment performed due to patient's multiple deficits requiring two skilled therapists to appropriately and safely assess patient's strength and endurance while facilitating functional tasks in addition to accommodating for patient's activity tolerance.    Treatment Type: Treatment  PT/PTA: PTA     PTA Visit Number: 3     12/21/2022

## 2022-12-21 NOTE — NURSING
Report given to Nurse Jason. VSS on RA, NAD, Afebrile, voids per external cath. IV access dc. Cardiac monitoring dc, monitor tech notified. Discharged to Nursing home per stretcher.

## 2022-12-22 NOTE — PHYSICIAN QUERY
PT Name: Tierra Jennings  MR #: [id number]     DOCUMENTATION CLARIFICATION     CDS/: Penny Garcia RN             Contact information: [email or web address]  This form is a permanent document in the medical record.     Query Date: December 22, 2022    By submitting this query, we are merely seeking further clarification of documentation.  Please utilize your independent clinical judgment when addressing the question(s) below.    The Medical Record contains the following:   Indicators   Supporting Clinical Findings Location in Medical Record    Non-blanchable erythema/redness     x Ulcer/Injury/Skin Breakdown Redness/ shallow Ulcer on sacrum       12/14 RN PN    Deep Tissue Injury     x Wound care consult Nursing reports sacral pressure injury is DTI instead of blanchable redness as reported 12/14  On Isoflex mattress wearing EHOB boots; positioned with foam wedge      Sacrum- DTI sacrum 5 cm(L) 1.5 cm(w) with dark red discoloration below DTI. Epidermis intact. Erythema surrounding DTI.     Treatment:  Avoid pressure to left heel and sacral DTI. Continue local wound care with Mepilex foam dressing while DTI evolves.   Reconsult WOC nurse as needed.        12/15 Wound Care RN   X   Acute/Chronic Illness Shock  AMS  DNR  Acute encephalopathy  PAF  DM   12/21 Hosp Med PN   x Medication/Treatment Wound Care consult 12/15 Wound care RN  PN        Other       The clinical guidelines noted are only a system guideline. It does not replace the providers clinical judgment.    Per the National Pressure Injury Advisory Panel:   A pressure injury is localized damage to the skin and underlying soft tissue usually over a bony prominence or related to a medical or other device. The injury can present as intact skin or an open ulcer and may be painful. The injury occurs as a result of intense and/or prolonged pressure or pressure in combination with shear. The tolerance of soft tissue for pressure and shear may  also be affected by microclimate, nutrition, perfusion, co-morbidities and condition of the soft tissue.       Stage 1 Pressure Injury:  Intact skin with a localized area of non-blanchable erythema, which may appear differently in darkly pigmented skin. Color changes do not include purple or maroon discoloration; these may indicate deep tissue pressure injury.    Stage 2 Pressure Injury:  Partial-thickness loss of skin with exposed dermis. The wound bed is viable, pink or red, moist, and may also present as an intact or ruptured serum-filled blister.    Deep Tissue Pressure Injury:  Intact or non-intact skin with localized area of persistent non-blanchable deep red, maroon, purple discoloration or epidermal separation revealing a dark wound bed or blood filled blister. This injury results from intense and/or prolonged pressure and shear forces at the bone-muscle interface. The wound may evolve rapidly to reveal the actual extent of tissue injury, or may resolve without tissue loss. If necrotic tissue, subcutaneous tissue, granulation tissue, fascia, muscle or other underlying structures are visible, this indicates a full thickness pressure injury (Unstageable, Stage 3 or Stage 4). Do not use DTPI to describe vascular, traumatic, neuropathic, or dermatologic conditions.       Provider, please provide the integumentary diagnosis related to the documentation of Sacrum:     [   x] Deep Tissue Pressure Injury     [   ] Other Integumentary Diagnosis (please specify):______________     [  ] Clinically Undetermined       Present on admission (POA) status:    [   x] Yes (Y)   [   ] No (N)   [   ] Documentation insufficient to determine if condition is POA (U)   [  ] Clinically Undetermined (W)       Please document in your progress notes daily for the duration of treatment until resolved and include in your discharge summary.    Reference:    MICHELLE Tompkins., ANGEL LUIS Lara., Goldberg, M., RADHA Hough., RADHA Orourke., & Treva,  KARLA (2016). Revised National Pressure Ulcer Advisory Panel Pressure Injury Staging System: Revised Pressure Injury Staging System. J Wound Ostomy Continence Nurs, 43(6), 585-597. doi:10.1097/won.1835807081976653    Form No.[id number]

## 2022-12-22 NOTE — PHYSICIAN QUERY
PT Name: Tierra Jennings  MR #: [id number]     DOCUMENTATION CLARIFICATION     CDS/: Penny Garcia RN             Contact information: [email or web address]  This form is a permanent document in the medical record.     Query Date: December 22, 2022    By submitting this query, we are merely seeking further clarification of documentation.  Please utilize your independent clinical judgment when addressing the question(s) below.    The Medical Record contains the following:   Indicators   Supporting Clinical Findings Location in Medical Record    Non-blanchable erythema/redness     x Ulcer/Injury/Skin Breakdown Redness/ shallow Ulcer on sacrum       12/14 RN PN    Deep Tissue Injury     x Wound care consult Left medial heel- DTI 2 cm(L) 3 cm(w) with epidermis intact with 1 cm erythema surrounding    Treatment:  Avoid pressure to left heel and sacral DTI. Continue local wound care with Mepilex foam dressing while DTI evolves.   Reconsult WOC nurse as needed.        12/15 Wound Care RN   X   Acute/Chronic Illness Shock  AMS  DNR  Acute encephalopathy  PAF  DM   12/21 Hosp Med PN   x Medication/Treatment Wound Care consult 12/15 Wound care RN  PN        Other       The clinical guidelines noted are only a system guideline. It does not replace the providers clinical judgment.    Per the National Pressure Injury Advisory Panel:   A pressure injury is localized damage to the skin and underlying soft tissue usually over a bony prominence or related to a medical or other device. The injury can present as intact skin or an open ulcer and may be painful. The injury occurs as a result of intense and/or prolonged pressure or pressure in combination with shear. The tolerance of soft tissue for pressure and shear may also be affected by microclimate, nutrition, perfusion, co-morbidities and condition of the soft tissue.       Stage 1 Pressure Injury:  Intact skin with a localized area of non-blanchable  erythema, which may appear differently in darkly pigmented skin. Color changes do not include purple or maroon discoloration; these may indicate deep tissue pressure injury.    Stage 2 Pressure Injury:  Partial-thickness loss of skin with exposed dermis. The wound bed is viable, pink or red, moist, and may also present as an intact or ruptured serum-filled blister.    Deep Tissue Pressure Injury:  Intact or non-intact skin with localized area of persistent non-blanchable deep red, maroon, purple discoloration or epidermal separation revealing a dark wound bed or blood filled blister. This injury results from intense and/or prolonged pressure and shear forces at the bone-muscle interface. The wound may evolve rapidly to reveal the actual extent of tissue injury, or may resolve without tissue loss. If necrotic tissue, subcutaneous tissue, granulation tissue, fascia, muscle or other underlying structures are visible, this indicates a full thickness pressure injury (Unstageable, Stage 3 or Stage 4). Do not use DTPI to describe vascular, traumatic, neuropathic, or dermatologic conditions.       Provider, please provide the integumentary diagnosis related to the documentation of Left heel:      [  x ] Deep Tissue Pressure Injury     [   ] Other Integumentary Diagnosis (please specify):______________     [  ] Clinically Undetermined       Present on admission (POA) status:    [   x] Yes (Y)   [   ] No (N)   [   ] Documentation insufficient to determine if condition is POA (U)   [  ] Clinically Undetermined (W)       Please document in your progress notes daily for the duration of treatment until resolved and include in your discharge summary.    Reference:    MICHELLE Tompkins., ANGEL LUIS Lara., Goldberg, M., MICHELLE Hough, MICHELLE Orourke, & RONALD Tilley. (2016). Revised National Pressure Ulcer Advisory Panel Pressure Injury Staging System: Revised Pressure Injury Staging System. J Wound Ostomy Continence Nurs, 43(6), 585-597.  doi:10.1097/won.[id number]    Form No.[id number]

## 2022-12-27 NOTE — TELEPHONE ENCOUNTER
Pt Son states pt is currently at a rehab facility right now and unable to come into the office. Informed son to call when pt is discharged and we will do the necessary paper work for admission. He voiced understanding. Forms scanned to pt chart and on Crystal's desk.

## 2022-12-27 NOTE — TELEPHONE ENCOUNTER
----- Message from Bria Brown sent at 12/27/2022 11:45 AM CST -----  riky Paredes brought in paper work for [facility].  [phone] gave to Crystal

## 2023-01-01 ENCOUNTER — TELEPHONE (OUTPATIENT)
Dept: CARDIOLOGY | Facility: CLINIC | Age: 88
End: 2023-01-01
Payer: MEDICARE

## 2023-01-01 ENCOUNTER — TELEPHONE (OUTPATIENT)
Dept: FAMILY MEDICINE | Facility: CLINIC | Age: 88
End: 2023-01-01

## 2023-01-01 ENCOUNTER — EXTERNAL HOME HEALTH (OUTPATIENT)
Dept: HOME HEALTH SERVICES | Facility: HOSPITAL | Age: 88
End: 2023-01-01
Payer: MEDICARE

## 2023-01-01 ENCOUNTER — DOCUMENT SCAN (OUTPATIENT)
Dept: HOME HEALTH SERVICES | Facility: HOSPITAL | Age: 88
End: 2023-01-01
Payer: MEDICARE

## 2023-01-01 ENCOUNTER — OUTSIDE PLACE OF SERVICE (OUTPATIENT)
Dept: FAMILY MEDICINE | Facility: CLINIC | Age: 88
End: 2023-01-01
Payer: MEDICARE

## 2023-01-01 ENCOUNTER — PATIENT MESSAGE (OUTPATIENT)
Dept: INTERNAL MEDICINE | Facility: CLINIC | Age: 88
End: 2023-01-01
Payer: MEDICARE

## 2023-01-01 ENCOUNTER — PATIENT OUTREACH (OUTPATIENT)
Dept: FAMILY MEDICINE | Facility: CLINIC | Age: 88
End: 2023-01-01

## 2023-01-01 ENCOUNTER — HOSPITAL ENCOUNTER (INPATIENT)
Facility: HOSPITAL | Age: 88
LOS: 4 days | Discharge: HOSPICE | DRG: 291 | End: 2023-06-09
Attending: EMERGENCY MEDICINE | Admitting: INTERNAL MEDICINE
Payer: MEDICARE

## 2023-01-01 ENCOUNTER — CLINICAL SUPPORT (OUTPATIENT)
Dept: CARDIOLOGY | Facility: HOSPITAL | Age: 88
DRG: 291 | End: 2023-01-01
Payer: MEDICARE

## 2023-01-01 ENCOUNTER — OUTPATIENT CASE MANAGEMENT (OUTPATIENT)
Dept: ADMINISTRATIVE | Facility: OTHER | Age: 88
End: 2023-01-01
Payer: MEDICARE

## 2023-01-01 ENCOUNTER — PATIENT OUTREACH (OUTPATIENT)
Dept: HOME HEALTH SERVICES | Facility: HOSPITAL | Age: 88
End: 2023-01-01

## 2023-01-01 VITALS
BODY MASS INDEX: 26.72 KG/M2 | BODY MASS INDEX: 26.68 KG/M2 | HEIGHT: 66 IN | HEIGHT: 66 IN | SYSTOLIC BLOOD PRESSURE: 115 MMHG | WEIGHT: 166.25 LBS | TEMPERATURE: 98 F | OXYGEN SATURATION: 95 % | HEART RATE: 120 BPM | DIASTOLIC BLOOD PRESSURE: 70 MMHG | RESPIRATION RATE: 18 BRPM | WEIGHT: 166 LBS

## 2023-01-01 DIAGNOSIS — I10 HYPERTENSION, UNSPECIFIED TYPE: ICD-10-CM

## 2023-01-01 DIAGNOSIS — R53.81 DEBILITY: ICD-10-CM

## 2023-01-01 DIAGNOSIS — R39.9 UTI SYMPTOMS: Primary | ICD-10-CM

## 2023-01-01 DIAGNOSIS — R79.89 ELEVATED TROPONIN: ICD-10-CM

## 2023-01-01 DIAGNOSIS — E11.9 TYPE 2 DIABETES MELLITUS WITHOUT COMPLICATION, WITHOUT LONG-TERM CURRENT USE OF INSULIN: ICD-10-CM

## 2023-01-01 DIAGNOSIS — F01.B18 MODERATE VASCULAR DEMENTIA WITH OTHER BEHAVIORAL DISTURBANCE: Primary | ICD-10-CM

## 2023-01-01 DIAGNOSIS — L89.609: ICD-10-CM

## 2023-01-01 DIAGNOSIS — I48.0 PAF (PAROXYSMAL ATRIAL FIBRILLATION): Primary | ICD-10-CM

## 2023-01-01 DIAGNOSIS — I87.2 VENOUS INSUFFICIENCY OF BOTH LOWER EXTREMITIES: ICD-10-CM

## 2023-01-01 DIAGNOSIS — L89.622 PRESSURE INJURY OF LEFT HEEL, STAGE 2: Primary | ICD-10-CM

## 2023-01-01 DIAGNOSIS — L03.115 BILATERAL LOWER LEG CELLULITIS: Primary | ICD-10-CM

## 2023-01-01 DIAGNOSIS — I10 HYPERTENSION: ICD-10-CM

## 2023-01-01 DIAGNOSIS — R07.9 CHEST PAIN: ICD-10-CM

## 2023-01-01 DIAGNOSIS — E11.9 TYPE 2 DIABETES MELLITUS WITHOUT COMPLICATION, WITHOUT LONG-TERM CURRENT USE OF INSULIN: Chronic | ICD-10-CM

## 2023-01-01 DIAGNOSIS — I10 ESSENTIAL HYPERTENSION: ICD-10-CM

## 2023-01-01 DIAGNOSIS — I50.9 NEW ONSET OF CONGESTIVE HEART FAILURE: Primary | ICD-10-CM

## 2023-01-01 DIAGNOSIS — E78.2 MIXED HYPERLIPIDEMIA: Primary | ICD-10-CM

## 2023-01-01 DIAGNOSIS — Z99.89 WALKER AS AMBULATION AID: ICD-10-CM

## 2023-01-01 DIAGNOSIS — S31.000A SACRAL WOUND, INITIAL ENCOUNTER: Primary | ICD-10-CM

## 2023-01-01 DIAGNOSIS — I48.20 CHRONIC ATRIAL FIBRILLATION: Primary | ICD-10-CM

## 2023-01-01 DIAGNOSIS — L03.116 BILATERAL LOWER LEG CELLULITIS: Primary | ICD-10-CM

## 2023-01-01 LAB
ALBUMIN SERPL BCP-MCNC: 2.9 G/DL (ref 3.5–5.2)
ALBUMIN SERPL BCP-MCNC: 2.9 G/DL (ref 3.5–5.2)
ALBUMIN SERPL BCP-MCNC: 3 G/DL (ref 3.5–5.2)
ALBUMIN SERPL BCP-MCNC: 3.1 G/DL (ref 3.5–5.2)
ALP SERPL-CCNC: 79 U/L (ref 55–135)
ALP SERPL-CCNC: 82 U/L (ref 55–135)
ALP SERPL-CCNC: 82 U/L (ref 55–135)
ALP SERPL-CCNC: 90 U/L (ref 55–135)
ALT SERPL W/O P-5'-P-CCNC: 15 U/L (ref 10–44)
ALT SERPL W/O P-5'-P-CCNC: 16 U/L (ref 10–44)
ALT SERPL W/O P-5'-P-CCNC: 17 U/L (ref 10–44)
ALT SERPL W/O P-5'-P-CCNC: 18 U/L (ref 10–44)
ANION GAP SERPL CALC-SCNC: 4 MMOL/L (ref 8–16)
ANION GAP SERPL CALC-SCNC: 6 MMOL/L (ref 8–16)
ANION GAP SERPL CALC-SCNC: 7 MMOL/L (ref 8–16)
ANION GAP SERPL CALC-SCNC: 7 MMOL/L (ref 8–16)
ANION GAP SERPL CALC-SCNC: 9 MMOL/L (ref 8–16)
AST SERPL-CCNC: 20 U/L (ref 10–40)
AST SERPL-CCNC: 20 U/L (ref 10–40)
AST SERPL-CCNC: 21 U/L (ref 10–40)
AST SERPL-CCNC: 22 U/L (ref 10–40)
AV INDEX (PROSTH): 0.59
AV MEAN GRADIENT: 2 MMHG
AV PEAK GRADIENT: 5 MMHG
AV VALVE AREA: 1.66 CM2
AV VELOCITY RATIO: 0.64
BASOPHILS # BLD AUTO: 0.05 K/UL (ref 0–0.2)
BASOPHILS # BLD AUTO: 0.05 K/UL (ref 0–0.2)
BASOPHILS # BLD AUTO: 0.06 K/UL (ref 0–0.2)
BASOPHILS # BLD AUTO: 0.08 K/UL (ref 0–0.2)
BASOPHILS NFR BLD: 0.5 % (ref 0–1.9)
BASOPHILS NFR BLD: 0.5 % (ref 0–1.9)
BASOPHILS NFR BLD: 0.6 % (ref 0–1.9)
BASOPHILS NFR BLD: 0.9 % (ref 0–1.9)
BILIRUB SERPL-MCNC: 1.1 MG/DL (ref 0.1–1)
BILIRUB SERPL-MCNC: 1.2 MG/DL (ref 0.1–1)
BILIRUB SERPL-MCNC: 1.3 MG/DL (ref 0.1–1)
BILIRUB SERPL-MCNC: 1.6 MG/DL (ref 0.1–1)
BNP SERPL-MCNC: 1266 PG/ML (ref 0–99)
BNP SERPL-MCNC: 1294 PG/ML (ref 0–99)
BSA FOR ECHO PROCEDURE: 1.87 M2
BUN SERPL-MCNC: 36 MG/DL (ref 8–23)
BUN SERPL-MCNC: 43 MG/DL (ref 8–23)
BUN SERPL-MCNC: 47 MG/DL (ref 8–23)
BUN SERPL-MCNC: 49 MG/DL (ref 8–23)
BUN SERPL-MCNC: 49 MG/DL (ref 8–23)
CALCIUM SERPL-MCNC: 8.1 MG/DL (ref 8.7–10.5)
CALCIUM SERPL-MCNC: 8.3 MG/DL (ref 8.7–10.5)
CALCIUM SERPL-MCNC: 8.4 MG/DL (ref 8.7–10.5)
CALCIUM SERPL-MCNC: 8.5 MG/DL (ref 8.7–10.5)
CALCIUM SERPL-MCNC: 8.8 MG/DL (ref 8.7–10.5)
CHLORIDE SERPL-SCNC: 107 MMOL/L (ref 95–110)
CHLORIDE SERPL-SCNC: 107 MMOL/L (ref 95–110)
CHLORIDE SERPL-SCNC: 108 MMOL/L (ref 95–110)
CHLORIDE SERPL-SCNC: 108 MMOL/L (ref 95–110)
CHLORIDE SERPL-SCNC: 110 MMOL/L (ref 95–110)
CHOLEST SERPL-MCNC: 76 MG/DL (ref 120–199)
CHOLEST/HDLC SERPL: 2.7 {RATIO} (ref 2–5)
CO2 SERPL-SCNC: 22 MMOL/L (ref 23–29)
CO2 SERPL-SCNC: 22 MMOL/L (ref 23–29)
CO2 SERPL-SCNC: 23 MMOL/L (ref 23–29)
CO2 SERPL-SCNC: 23 MMOL/L (ref 23–29)
CO2 SERPL-SCNC: 24 MMOL/L (ref 23–29)
CREAT SERPL-MCNC: 1.1 MG/DL (ref 0.5–1.4)
CREAT SERPL-MCNC: 1.1 MG/DL (ref 0.5–1.4)
CREAT SERPL-MCNC: 1.2 MG/DL (ref 0.5–1.4)
CREAT SERPL-MCNC: 1.3 MG/DL (ref 0.5–1.4)
CREAT SERPL-MCNC: 1.4 MG/DL (ref 0.5–1.4)
CV ECHO LV RWT: 0.42 CM
D DIMER PPP IA.FEU-MCNC: 1.32 MG/L FEU
DIFFERENTIAL METHOD: ABNORMAL
DOP CALC AO PEAK VEL: 1.07 M/S
DOP CALC AO VTI: 20.1 CM
DOP CALC LVOT AREA: 2.8 CM2
DOP CALC LVOT DIAMETER: 1.9 CM
DOP CALC LVOT PEAK VEL: 0.68 M/S
DOP CALC LVOT STROKE VOLUME: 33.44 CM3
DOP CALC MV VTI: 22.7 CM
DOP CALCLVOT PEAK VEL VTI: 11.8 CM
E WAVE DECELERATION TIME: 183 MSEC
E/A RATIO: 3.32
E/E' RATIO: 16.4 M/S
ECHO LV POSTERIOR WALL: 1.04 CM (ref 0.6–1.1)
EJECTION FRACTION: 35 %
EOSINOPHIL # BLD AUTO: 0.1 K/UL (ref 0–0.5)
EOSINOPHIL # BLD AUTO: 0.2 K/UL (ref 0–0.5)
EOSINOPHIL NFR BLD: 0.9 % (ref 0–8)
EOSINOPHIL NFR BLD: 1.4 % (ref 0–8)
EOSINOPHIL NFR BLD: 1.6 % (ref 0–8)
EOSINOPHIL NFR BLD: 1.6 % (ref 0–8)
ERYTHROCYTE [DISTWIDTH] IN BLOOD BY AUTOMATED COUNT: 15.9 % (ref 11.5–14.5)
ERYTHROCYTE [DISTWIDTH] IN BLOOD BY AUTOMATED COUNT: 16 % (ref 11.5–14.5)
EST. GFR  (NO RACE VARIABLE): 35.7 ML/MIN/1.73 M^2
EST. GFR  (NO RACE VARIABLE): 39.1 ML/MIN/1.73 M^2
EST. GFR  (NO RACE VARIABLE): 43 ML/MIN/1.73 M^2
EST. GFR  (NO RACE VARIABLE): 47.7 ML/MIN/1.73 M^2
EST. GFR  (NO RACE VARIABLE): 47.7 ML/MIN/1.73 M^2
ESTIMATED AVG GLUCOSE: 114 MG/DL (ref 68–131)
FRACTIONAL SHORTENING: 14 % (ref 28–44)
GLUCOSE SERPL-MCNC: 100 MG/DL (ref 70–110)
GLUCOSE SERPL-MCNC: 101 MG/DL (ref 70–110)
GLUCOSE SERPL-MCNC: 103 MG/DL (ref 70–110)
GLUCOSE SERPL-MCNC: 105 MG/DL (ref 70–110)
GLUCOSE SERPL-MCNC: 107 MG/DL (ref 70–110)
GLUCOSE SERPL-MCNC: 108 MG/DL (ref 70–110)
GLUCOSE SERPL-MCNC: 110 MG/DL (ref 70–110)
GLUCOSE SERPL-MCNC: 115 MG/DL (ref 70–110)
GLUCOSE SERPL-MCNC: 124 MG/DL (ref 70–110)
GLUCOSE SERPL-MCNC: 129 MG/DL (ref 70–110)
GLUCOSE SERPL-MCNC: 130 MG/DL (ref 70–110)
GLUCOSE SERPL-MCNC: 144 MG/DL (ref 70–110)
GLUCOSE SERPL-MCNC: 66 MG/DL (ref 70–110)
GLUCOSE SERPL-MCNC: 77 MG/DL (ref 70–110)
GLUCOSE SERPL-MCNC: 93 MG/DL (ref 70–110)
GLUCOSE SERPL-MCNC: 94 MG/DL (ref 70–110)
HBA1C MFR BLD: 5.6 % (ref 4.5–6.2)
HCT VFR BLD AUTO: 31.1 % (ref 37–48.5)
HCT VFR BLD AUTO: 31.4 % (ref 37–48.5)
HCT VFR BLD AUTO: 32 % (ref 37–48.5)
HCT VFR BLD AUTO: 33.5 % (ref 37–48.5)
HDLC SERPL-MCNC: 28 MG/DL (ref 40–75)
HDLC SERPL: 36.8 % (ref 20–50)
HGB BLD-MCNC: 10 G/DL (ref 12–16)
HGB BLD-MCNC: 10.1 G/DL (ref 12–16)
HGB BLD-MCNC: 10.9 G/DL (ref 12–16)
HGB BLD-MCNC: 9.9 G/DL (ref 12–16)
IMM GRANULOCYTES # BLD AUTO: 0.05 K/UL (ref 0–0.04)
IMM GRANULOCYTES # BLD AUTO: 0.06 K/UL (ref 0–0.04)
IMM GRANULOCYTES NFR BLD AUTO: 0.5 % (ref 0–0.5)
IMM GRANULOCYTES NFR BLD AUTO: 0.5 % (ref 0–0.5)
IMM GRANULOCYTES NFR BLD AUTO: 0.6 % (ref 0–0.5)
IMM GRANULOCYTES NFR BLD AUTO: 0.6 % (ref 0–0.5)
INTERVENTRICULAR SEPTUM: 0.92 CM (ref 0.6–1.1)
IVC DIAMETER: 2.65 CM
LDLC SERPL CALC-MCNC: 32.2 MG/DL (ref 63–159)
LEFT ATRIUM SIZE: 3.7 CM
LEFT ATRIUM VOLUME INDEX MOD: 34.7 ML/M2
LEFT ATRIUM VOLUME MOD: 64.2 CM3
LEFT INTERNAL DIMENSION IN SYSTOLE: 4.22 CM (ref 2.1–4)
LEFT VENTRICLE DIASTOLIC VOLUME INDEX: 61.27 ML/M2
LEFT VENTRICLE DIASTOLIC VOLUME: 113.35 ML
LEFT VENTRICLE MASS INDEX: 93 G/M2
LEFT VENTRICLE SYSTOLIC VOLUME INDEX: 43 ML/M2
LEFT VENTRICLE SYSTOLIC VOLUME: 79.47 ML
LEFT VENTRICULAR INTERNAL DIMENSION IN DIASTOLE: 4.91 CM (ref 3.5–6)
LEFT VENTRICULAR MASS: 171.89 G
LV LATERAL E/E' RATIO: 12.3 M/S
LV SEPTAL E/E' RATIO: 24.6 M/S
LVOT MG: 1 MMHG
LVOT MV: 0.45 CM/S
LYMPHOCYTES # BLD AUTO: 1.4 K/UL (ref 1–4.8)
LYMPHOCYTES # BLD AUTO: 1.5 K/UL (ref 1–4.8)
LYMPHOCYTES # BLD AUTO: 2 K/UL (ref 1–4.8)
LYMPHOCYTES # BLD AUTO: 2.4 K/UL (ref 1–4.8)
LYMPHOCYTES NFR BLD: 14.6 % (ref 18–48)
LYMPHOCYTES NFR BLD: 14.8 % (ref 18–48)
LYMPHOCYTES NFR BLD: 20 % (ref 18–48)
LYMPHOCYTES NFR BLD: 21.4 % (ref 18–48)
MAGNESIUM SERPL-MCNC: 1.7 MG/DL (ref 1.6–2.6)
MAGNESIUM SERPL-MCNC: 1.7 MG/DL (ref 1.6–2.6)
MAGNESIUM SERPL-MCNC: 2.1 MG/DL (ref 1.6–2.6)
MCH RBC QN AUTO: 29.4 PG (ref 27–31)
MCH RBC QN AUTO: 29.5 PG (ref 27–31)
MCH RBC QN AUTO: 29.9 PG (ref 27–31)
MCH RBC QN AUTO: 30.1 PG (ref 27–31)
MCHC RBC AUTO-ENTMCNC: 31.5 G/DL (ref 32–36)
MCHC RBC AUTO-ENTMCNC: 31.6 G/DL (ref 32–36)
MCHC RBC AUTO-ENTMCNC: 32.2 G/DL (ref 32–36)
MCHC RBC AUTO-ENTMCNC: 32.5 G/DL (ref 32–36)
MCV RBC AUTO: 93 FL (ref 82–98)
MCV RBC AUTO: 94 FL (ref 82–98)
MONOCYTES # BLD AUTO: 0.8 K/UL (ref 0.3–1)
MONOCYTES # BLD AUTO: 1 K/UL (ref 0.3–1)
MONOCYTES # BLD AUTO: 1.1 K/UL (ref 0.3–1)
MONOCYTES # BLD AUTO: 1.1 K/UL (ref 0.3–1)
MONOCYTES NFR BLD: 10 % (ref 4–15)
MONOCYTES NFR BLD: 10.7 % (ref 4–15)
MONOCYTES NFR BLD: 10.8 % (ref 4–15)
MONOCYTES NFR BLD: 8.1 % (ref 4–15)
MV MEAN GRADIENT: 3 MMHG
MV PEAK A VEL: 0.37 M/S
MV PEAK E VEL: 1.23 M/S
MV PEAK GRADIENT: 7 MMHG
MV STENOSIS PRESSURE HALF TIME: 70 MS
MV VALVE AREA BY CONTINUITY EQUATION: 1.47 CM2
MV VALVE AREA P 1/2 METHOD: 3.14 CM2
NEUTROPHILS # BLD AUTO: 6.7 K/UL (ref 1.8–7.7)
NEUTROPHILS # BLD AUTO: 6.7 K/UL (ref 1.8–7.7)
NEUTROPHILS # BLD AUTO: 7.3 K/UL (ref 1.8–7.7)
NEUTROPHILS # BLD AUTO: 7.6 K/UL (ref 1.8–7.7)
NEUTROPHILS NFR BLD: 66.2 % (ref 38–73)
NEUTROPHILS NFR BLD: 66.5 % (ref 38–73)
NEUTROPHILS NFR BLD: 71.6 % (ref 38–73)
NEUTROPHILS NFR BLD: 75.1 % (ref 38–73)
NONHDLC SERPL-MCNC: 48 MG/DL
NRBC BLD-RTO: 0 /100 WBC
OHS LV EJECTION FRACTION SIMPSONS BIPLANE MOD: 4 %
PISA MRMAX VEL: 3.75 M/S
PISA TR MAX VEL: 2.67 M/S
PLATELET # BLD AUTO: 327 K/UL (ref 150–450)
PLATELET # BLD AUTO: 334 K/UL (ref 150–450)
PLATELET # BLD AUTO: 338 K/UL (ref 150–450)
PLATELET # BLD AUTO: 429 K/UL (ref 150–450)
PMV BLD AUTO: 10.2 FL (ref 9.2–12.9)
PMV BLD AUTO: 10.3 FL (ref 9.2–12.9)
POTASSIUM SERPL-SCNC: 3.9 MMOL/L (ref 3.5–5.1)
POTASSIUM SERPL-SCNC: 4 MMOL/L (ref 3.5–5.1)
POTASSIUM SERPL-SCNC: 4.1 MMOL/L (ref 3.5–5.1)
POTASSIUM SERPL-SCNC: 4.1 MMOL/L (ref 3.5–5.1)
POTASSIUM SERPL-SCNC: 4.5 MMOL/L (ref 3.5–5.1)
PROT SERPL-MCNC: 6.4 G/DL (ref 6–8.4)
PROT SERPL-MCNC: 6.5 G/DL (ref 6–8.4)
PROT SERPL-MCNC: 6.8 G/DL (ref 6–8.4)
PROT SERPL-MCNC: 6.9 G/DL (ref 6–8.4)
PV MV: 0.43 M/S
PV PEAK VELOCITY: 0.62 CM/S
RA PRESSURE: 15 MMHG
RBC # BLD AUTO: 3.34 M/UL (ref 4–5.4)
RBC # BLD AUTO: 3.36 M/UL (ref 4–5.4)
RBC # BLD AUTO: 3.43 M/UL (ref 4–5.4)
RBC # BLD AUTO: 3.62 M/UL (ref 4–5.4)
RV TISSUE DOPPLER FREE WALL SYSTOLIC VELOCITY 1 (APICAL 4 CHAMBER VIEW): 0 CM/S
SINUS: 2.61 CM
SODIUM SERPL-SCNC: 134 MMOL/L (ref 136–145)
SODIUM SERPL-SCNC: 137 MMOL/L (ref 136–145)
SODIUM SERPL-SCNC: 138 MMOL/L (ref 136–145)
SODIUM SERPL-SCNC: 138 MMOL/L (ref 136–145)
SODIUM SERPL-SCNC: 140 MMOL/L (ref 136–145)
STJ: 2.03 CM
TDI LATERAL: 0.1 M/S
TDI SEPTAL: 0.05 M/S
TDI: 0.08 M/S
TR MAX PG: 29 MMHG
TRIGL SERPL-MCNC: 79 MG/DL (ref 30–150)
TROPONIN I SERPL HS-MCNC: 230.3 PG/ML (ref 0–14.9)
TROPONIN I SERPL HS-MCNC: 238.2 PG/ML (ref 0–14.9)
TROPONIN I SERPL HS-MCNC: 242.3 PG/ML (ref 0–14.9)
TROPONIN I SERPL HS-MCNC: 255.6 PG/ML (ref 0–14.9)
TSH SERPL DL<=0.005 MIU/L-ACNC: 4.18 UIU/ML (ref 0.34–5.6)
TV REST PULMONARY ARTERY PRESSURE: 44 MMHG
WBC # BLD AUTO: 10.07 K/UL (ref 3.9–12.7)
WBC # BLD AUTO: 10.1 K/UL (ref 3.9–12.7)
WBC # BLD AUTO: 10.96 K/UL (ref 3.9–12.7)
WBC # BLD AUTO: 9.34 K/UL (ref 3.9–12.7)

## 2023-01-01 PROCEDURE — 99900035 HC TECH TIME PER 15 MIN (STAT)

## 2023-01-01 PROCEDURE — 27000221 HC OXYGEN, UP TO 24 HOURS

## 2023-01-01 PROCEDURE — 93306 TTE W/DOPPLER COMPLETE: CPT | Mod: 26,,, | Performed by: INTERNAL MEDICINE

## 2023-01-01 PROCEDURE — G0180 MD CERTIFICATION HHA PATIENT: HCPCS | Mod: ,,, | Performed by: FAMILY MEDICINE

## 2023-01-01 PROCEDURE — 84484 ASSAY OF TROPONIN QUANT: CPT | Performed by: NURSE PRACTITIONER

## 2023-01-01 PROCEDURE — 83036 HEMOGLOBIN GLYCOSYLATED A1C: CPT | Performed by: NURSE PRACTITIONER

## 2023-01-01 PROCEDURE — G0179 MD RECERTIFICATION HHA PT: HCPCS | Mod: ,,, | Performed by: FAMILY MEDICINE

## 2023-01-01 PROCEDURE — 93005 ELECTROCARDIOGRAM TRACING: CPT | Performed by: INTERNAL MEDICINE

## 2023-01-01 PROCEDURE — 99223 PR INITIAL HOSPITAL CARE,LEVL III: ICD-10-PCS | Mod: ,,, | Performed by: INTERNAL MEDICINE

## 2023-01-01 PROCEDURE — 93306 TTE W/DOPPLER COMPLETE: CPT

## 2023-01-01 PROCEDURE — G0180 PR HOME HEALTH MD CERTIFICATION: ICD-10-PCS | Mod: ,,, | Performed by: FAMILY MEDICINE

## 2023-01-01 PROCEDURE — 97165 OT EVAL LOW COMPLEX 30 MIN: CPT

## 2023-01-01 PROCEDURE — 80061 LIPID PANEL: CPT | Performed by: NURSE PRACTITIONER

## 2023-01-01 PROCEDURE — 85025 COMPLETE CBC W/AUTO DIFF WBC: CPT | Performed by: INTERNAL MEDICINE

## 2023-01-01 PROCEDURE — 80053 COMPREHEN METABOLIC PANEL: CPT | Performed by: NURSE PRACTITIONER

## 2023-01-01 PROCEDURE — 99233 PR SUBSEQUENT HOSPITAL CARE,LEVL III: ICD-10-PCS | Mod: ,,, | Performed by: INTERNAL MEDICINE

## 2023-01-01 PROCEDURE — 93306 ECHO (CUPID ONLY): ICD-10-PCS | Mod: 26,,, | Performed by: INTERNAL MEDICINE

## 2023-01-01 PROCEDURE — 83735 ASSAY OF MAGNESIUM: CPT | Performed by: NURSE PRACTITIONER

## 2023-01-01 PROCEDURE — 36415 COLL VENOUS BLD VENIPUNCTURE: CPT | Performed by: INTERNAL MEDICINE

## 2023-01-01 PROCEDURE — 99900031 HC PATIENT EDUCATION (STAT)

## 2023-01-01 PROCEDURE — 94761 N-INVAS EAR/PLS OXIMETRY MLT: CPT

## 2023-01-01 PROCEDURE — 36415 COLL VENOUS BLD VENIPUNCTURE: CPT | Performed by: NURSE PRACTITIONER

## 2023-01-01 PROCEDURE — 25000003 PHARM REV CODE 250: Performed by: NURSE PRACTITIONER

## 2023-01-01 PROCEDURE — 84443 ASSAY THYROID STIM HORMONE: CPT | Performed by: NURSE PRACTITIONER

## 2023-01-01 PROCEDURE — 63600175 PHARM REV CODE 636 W HCPCS: Performed by: NURSE PRACTITIONER

## 2023-01-01 PROCEDURE — 85025 COMPLETE CBC W/AUTO DIFF WBC: CPT | Performed by: NURSE PRACTITIONER

## 2023-01-01 PROCEDURE — 99285 EMERGENCY DEPT VISIT HI MDM: CPT | Mod: 25

## 2023-01-01 PROCEDURE — 21000000 HC CCU ICU ROOM CHARGE

## 2023-01-01 PROCEDURE — 99233 SBSQ HOSP IP/OBS HIGH 50: CPT | Mod: ,,, | Performed by: INTERNAL MEDICINE

## 2023-01-01 PROCEDURE — 25000003 PHARM REV CODE 250: Performed by: EMERGENCY MEDICINE

## 2023-01-01 PROCEDURE — 80048 BASIC METABOLIC PNL TOTAL CA: CPT | Performed by: INTERNAL MEDICINE

## 2023-01-01 PROCEDURE — 84484 ASSAY OF TROPONIN QUANT: CPT | Mod: 91 | Performed by: NURSE PRACTITIONER

## 2023-01-01 PROCEDURE — 93010 EKG 12-LEAD: ICD-10-PCS | Mod: ,,, | Performed by: INTERNAL MEDICINE

## 2023-01-01 PROCEDURE — 93010 ELECTROCARDIOGRAM REPORT: CPT | Mod: ,,, | Performed by: INTERNAL MEDICINE

## 2023-01-01 PROCEDURE — 99223 PR INITIAL HOSPITAL CARE,LEVL III: ICD-10-PCS | Mod: 25,,, | Performed by: INTERNAL MEDICINE

## 2023-01-01 PROCEDURE — 25000003 PHARM REV CODE 250: Performed by: INTERNAL MEDICINE

## 2023-01-01 PROCEDURE — 96374 THER/PROPH/DIAG INJ IV PUSH: CPT

## 2023-01-01 PROCEDURE — 63600175 PHARM REV CODE 636 W HCPCS: Performed by: INTERNAL MEDICINE

## 2023-01-01 PROCEDURE — 63600175 PHARM REV CODE 636 W HCPCS: Performed by: EMERGENCY MEDICINE

## 2023-01-01 PROCEDURE — 99223 1ST HOSP IP/OBS HIGH 75: CPT | Mod: ,,, | Performed by: INTERNAL MEDICINE

## 2023-01-01 PROCEDURE — 12000002 HC ACUTE/MED SURGE SEMI-PRIVATE ROOM

## 2023-01-01 PROCEDURE — 99349 PR HOME VISIT,ESTAB PATIENT,LEVEL III: ICD-10-PCS | Mod: S$GLB,,, | Performed by: FAMILY MEDICINE

## 2023-01-01 PROCEDURE — G0179 PR HOME HEALTH MD RECERTIFICATION: ICD-10-PCS | Mod: ,,, | Performed by: FAMILY MEDICINE

## 2023-01-01 PROCEDURE — 99223 1ST HOSP IP/OBS HIGH 75: CPT | Mod: 25,,, | Performed by: INTERNAL MEDICINE

## 2023-01-01 PROCEDURE — 80053 COMPREHEN METABOLIC PANEL: CPT | Performed by: INTERNAL MEDICINE

## 2023-01-01 PROCEDURE — 99349 HOME/RES VST EST MOD MDM 40: CPT | Mod: S$GLB,,, | Performed by: FAMILY MEDICINE

## 2023-01-01 PROCEDURE — 83735 ASSAY OF MAGNESIUM: CPT | Performed by: INTERNAL MEDICINE

## 2023-01-01 PROCEDURE — 85379 FIBRIN DEGRADATION QUANT: CPT | Performed by: NURSE PRACTITIONER

## 2023-01-01 PROCEDURE — 83880 ASSAY OF NATRIURETIC PEPTIDE: CPT | Performed by: NURSE PRACTITIONER

## 2023-01-01 PROCEDURE — 82962 GLUCOSE BLOOD TEST: CPT

## 2023-01-01 RX ORDER — METFORMIN HYDROCHLORIDE 500 MG/1
500 TABLET, EXTENDED RELEASE ORAL
Qty: 90 TABLET | Refills: 1 | Status: SHIPPED | OUTPATIENT
Start: 2023-01-01 | End: 2023-11-15

## 2023-01-01 RX ORDER — PROCHLORPERAZINE EDISYLATE 5 MG/ML
5 INJECTION INTRAMUSCULAR; INTRAVENOUS EVERY 6 HOURS PRN
Status: DISCONTINUED | OUTPATIENT
Start: 2023-01-01 | End: 2023-01-01 | Stop reason: HOSPADM

## 2023-01-01 RX ORDER — SODIUM,POTASSIUM PHOSPHATES 280-250MG
2 POWDER IN PACKET (EA) ORAL
Status: DISCONTINUED | OUTPATIENT
Start: 2023-01-01 | End: 2023-01-01 | Stop reason: HOSPADM

## 2023-01-01 RX ORDER — FLECAINIDE ACETATE 50 MG/1
50 TABLET ORAL EVERY 12 HOURS
Qty: 60 TABLET | Refills: 0 | Status: SHIPPED | OUTPATIENT
Start: 2023-01-01 | End: 2023-01-01 | Stop reason: SDUPTHER

## 2023-01-01 RX ORDER — ONDANSETRON 2 MG/ML
4 INJECTION INTRAMUSCULAR; INTRAVENOUS EVERY 8 HOURS PRN
Status: DISCONTINUED | OUTPATIENT
Start: 2023-01-01 | End: 2023-01-01 | Stop reason: HOSPADM

## 2023-01-01 RX ORDER — FUROSEMIDE 10 MG/ML
40 INJECTION INTRAMUSCULAR; INTRAVENOUS
Status: COMPLETED | OUTPATIENT
Start: 2023-01-01 | End: 2023-01-01

## 2023-01-01 RX ORDER — GLIPIZIDE 2.5 MG/1
2.5 TABLET, EXTENDED RELEASE ORAL
Qty: 90 TABLET | Refills: 1 | Status: SHIPPED | OUTPATIENT
Start: 2023-01-01 | End: 2023-11-15

## 2023-01-01 RX ORDER — IBUPROFEN 200 MG
16 TABLET ORAL
Status: DISCONTINUED | OUTPATIENT
Start: 2023-01-01 | End: 2023-01-01 | Stop reason: HOSPADM

## 2023-01-01 RX ORDER — ACETAMINOPHEN 325 MG/1
650 TABLET ORAL EVERY 8 HOURS PRN
Status: DISCONTINUED | OUTPATIENT
Start: 2023-01-01 | End: 2023-01-01 | Stop reason: HOSPADM

## 2023-01-01 RX ORDER — LOSARTAN POTASSIUM 25 MG/1
25 TABLET ORAL DAILY
Status: DISCONTINUED | OUTPATIENT
Start: 2023-01-01 | End: 2023-01-01 | Stop reason: HOSPADM

## 2023-01-01 RX ORDER — OLMESARTAN MEDOXOMIL 40 MG/1
40 TABLET ORAL DAILY
Qty: 90 TABLET | Refills: 1 | Status: ON HOLD | OUTPATIENT
Start: 2023-01-01 | End: 2023-01-01 | Stop reason: HOSPADM

## 2023-01-01 RX ORDER — ACETAMINOPHEN 500 MG
1 TABLET ORAL NIGHTLY
COMMUNITY
End: 2023-01-01 | Stop reason: SDUPTHER

## 2023-01-01 RX ORDER — MAGNESIUM SULFATE 1 G/100ML
1 INJECTION INTRAVENOUS ONCE
Status: COMPLETED | OUTPATIENT
Start: 2023-01-01 | End: 2023-01-01

## 2023-01-01 RX ORDER — LANOLIN ALCOHOL/MO/W.PET/CERES
800 CREAM (GRAM) TOPICAL
Status: DISCONTINUED | OUTPATIENT
Start: 2023-01-01 | End: 2023-01-01 | Stop reason: HOSPADM

## 2023-01-01 RX ORDER — FUROSEMIDE 40 MG/1
40 TABLET ORAL DAILY
Qty: 90 TABLET | Refills: 1 | Status: ON HOLD | OUTPATIENT
Start: 2023-01-01 | End: 2023-01-01 | Stop reason: HOSPADM

## 2023-01-01 RX ORDER — POTASSIUM CHLORIDE 750 MG/1
10 TABLET, EXTENDED RELEASE ORAL DAILY
Qty: 90 TABLET | Refills: 1 | Status: ON HOLD | OUTPATIENT
Start: 2023-01-01 | End: 2023-01-01 | Stop reason: HOSPADM

## 2023-01-01 RX ORDER — TALC
6 POWDER (GRAM) TOPICAL NIGHTLY PRN
Status: DISCONTINUED | OUTPATIENT
Start: 2023-01-01 | End: 2023-01-01 | Stop reason: HOSPADM

## 2023-01-01 RX ORDER — ATORVASTATIN CALCIUM 40 MG/1
40 TABLET, FILM COATED ORAL DAILY
Qty: 90 TABLET | Refills: 1 | Status: ON HOLD | OUTPATIENT
Start: 2023-01-01 | End: 2023-01-01 | Stop reason: HOSPADM

## 2023-01-01 RX ORDER — GLUCAGON 1 MG
1 KIT INJECTION
Status: DISCONTINUED | OUTPATIENT
Start: 2023-01-01 | End: 2023-01-01 | Stop reason: HOSPADM

## 2023-01-01 RX ORDER — CLINDAMYCIN HYDROCHLORIDE 300 MG/1
300 CAPSULE ORAL 3 TIMES DAILY
Qty: 21 CAPSULE | Refills: 0 | Status: SHIPPED | OUTPATIENT
Start: 2023-01-01 | End: 2023-01-01

## 2023-01-01 RX ORDER — FLECAINIDE ACETATE 50 MG/1
50 TABLET ORAL EVERY 12 HOURS
Status: DISCONTINUED | OUTPATIENT
Start: 2023-01-01 | End: 2023-01-01

## 2023-01-01 RX ORDER — METFORMIN HYDROCHLORIDE 500 MG/1
500 TABLET, EXTENDED RELEASE ORAL
Qty: 90 TABLET | Refills: 1 | Status: SHIPPED | OUTPATIENT
Start: 2023-01-01 | End: 2023-01-01 | Stop reason: SDUPTHER

## 2023-01-01 RX ORDER — METOPROLOL SUCCINATE 25 MG/1
12.5 TABLET, EXTENDED RELEASE ORAL DAILY
Qty: 45 TABLET | Refills: 1 | Status: SHIPPED | OUTPATIENT
Start: 2023-01-01

## 2023-01-01 RX ORDER — ACETAMINOPHEN, DIPHENHYDRAMINE HCL, PHENYLEPHRINE HCL 325; 25; 5 MG/1; MG/1; MG/1
10 TABLET ORAL NIGHTLY PRN
COMMUNITY

## 2023-01-01 RX ORDER — BALSAM PERU/CASTOR OIL
OINTMENT (GRAM) TOPICAL 2 TIMES DAILY
Status: DISCONTINUED | OUTPATIENT
Start: 2023-01-01 | End: 2023-01-01 | Stop reason: HOSPADM

## 2023-01-01 RX ORDER — VALSARTAN 80 MG/1
320 TABLET ORAL NIGHTLY
Status: DISCONTINUED | OUTPATIENT
Start: 2023-01-01 | End: 2023-01-01

## 2023-01-01 RX ORDER — FUROSEMIDE 40 MG/1
40 TABLET ORAL DAILY
Status: DISCONTINUED | OUTPATIENT
Start: 2023-01-01 | End: 2023-01-01 | Stop reason: HOSPADM

## 2023-01-01 RX ORDER — POTASSIUM CHLORIDE 750 MG/1
10 TABLET, EXTENDED RELEASE ORAL DAILY
Qty: 90 TABLET | Refills: 1 | Status: SHIPPED | OUTPATIENT
Start: 2023-01-01 | End: 2023-01-01 | Stop reason: SDUPTHER

## 2023-01-01 RX ORDER — METOPROLOL SUCCINATE 25 MG/1
25 TABLET, EXTENDED RELEASE ORAL DAILY
Status: DISCONTINUED | OUTPATIENT
Start: 2023-01-01 | End: 2023-01-01 | Stop reason: HOSPADM

## 2023-01-01 RX ORDER — IBUPROFEN 200 MG
24 TABLET ORAL
Status: DISCONTINUED | OUTPATIENT
Start: 2023-01-01 | End: 2023-01-01 | Stop reason: HOSPADM

## 2023-01-01 RX ORDER — ATORVASTATIN CALCIUM 40 MG/1
40 TABLET, FILM COATED ORAL DAILY
Status: DISCONTINUED | OUTPATIENT
Start: 2023-01-01 | End: 2023-01-01

## 2023-01-01 RX ORDER — FUROSEMIDE 10 MG/ML
40 INJECTION INTRAMUSCULAR; INTRAVENOUS DAILY
Status: DISCONTINUED | OUTPATIENT
Start: 2023-01-01 | End: 2023-01-01

## 2023-01-01 RX ORDER — GLIPIZIDE 2.5 MG/1
2.5 TABLET, EXTENDED RELEASE ORAL
Qty: 90 TABLET | Refills: 1 | Status: SHIPPED | OUTPATIENT
Start: 2023-01-01 | End: 2023-01-01 | Stop reason: SDUPTHER

## 2023-01-01 RX ORDER — FUROSEMIDE 40 MG/1
40 TABLET ORAL DAILY
Qty: 90 TABLET | Refills: 1 | Status: SHIPPED | OUTPATIENT
Start: 2023-01-01 | End: 2023-01-01 | Stop reason: SDUPTHER

## 2023-01-01 RX ORDER — SODIUM CHLORIDE 0.9 % (FLUSH) 0.9 %
10 SYRINGE (ML) INJECTION
Status: DISCONTINUED | OUTPATIENT
Start: 2023-01-01 | End: 2023-01-01 | Stop reason: HOSPADM

## 2023-01-01 RX ORDER — NAPROXEN SODIUM 220 MG/1
162 TABLET, FILM COATED ORAL
Status: COMPLETED | OUTPATIENT
Start: 2023-01-01 | End: 2023-01-01

## 2023-01-01 RX ORDER — FLECAINIDE ACETATE 50 MG/1
50 TABLET ORAL EVERY 12 HOURS
Qty: 60 TABLET | Refills: 5 | Status: ON HOLD | OUTPATIENT
Start: 2023-01-01 | End: 2023-01-01 | Stop reason: HOSPADM

## 2023-01-01 RX ADMIN — CASTOR OIL AND BALSAM, PERU: 788; 87 OINTMENT TOPICAL at 10:06

## 2023-01-01 RX ADMIN — LOSARTAN POTASSIUM 12.5 MG: 25 TABLET, FILM COATED ORAL at 08:06

## 2023-01-01 RX ADMIN — LOSARTAN POTASSIUM 25 MG: 25 TABLET, FILM COATED ORAL at 09:06

## 2023-01-01 RX ADMIN — FUROSEMIDE 40 MG: 10 INJECTION, SOLUTION INTRAMUSCULAR; INTRAVENOUS at 08:06

## 2023-01-01 RX ADMIN — METOPROLOL SUCCINATE 12.5 MG: 25 TABLET, EXTENDED RELEASE ORAL at 08:06

## 2023-01-01 RX ADMIN — FUROSEMIDE 40 MG: 20 INJECTION, SOLUTION INTRAMUSCULAR; INTRAVENOUS at 06:06

## 2023-01-01 RX ADMIN — CASTOR OIL AND BALSAM, PERU: 788; 87 OINTMENT TOPICAL at 09:06

## 2023-01-01 RX ADMIN — LOSARTAN POTASSIUM 12.5 MG: 25 TABLET, FILM COATED ORAL at 04:06

## 2023-01-01 RX ADMIN — APIXABAN 5 MG: 5 TABLET, FILM COATED ORAL at 08:06

## 2023-01-01 RX ADMIN — APIXABAN 5 MG: 5 TABLET, FILM COATED ORAL at 09:06

## 2023-01-01 RX ADMIN — ASPIRIN 81 MG 162 MG: 81 TABLET ORAL at 06:06

## 2023-01-01 RX ADMIN — METOPROLOL SUCCINATE 12.5 MG: 25 TABLET, EXTENDED RELEASE ORAL at 09:06

## 2023-01-01 RX ADMIN — CASTOR OIL AND BALSAM, PERU: 788; 87 OINTMENT TOPICAL at 08:06

## 2023-01-01 RX ADMIN — NITROGLYCERIN 0.5 INCH: 20 OINTMENT TOPICAL at 12:06

## 2023-01-01 RX ADMIN — FLECAINIDE ACETATE TABLET 50 MG: 50 TABLET ORAL at 09:06

## 2023-01-01 RX ADMIN — ACETAMINOPHEN 650 MG: 325 TABLET ORAL at 09:06

## 2023-01-01 RX ADMIN — FUROSEMIDE 40 MG: 10 INJECTION, SOLUTION INTRAMUSCULAR; INTRAVENOUS at 09:06

## 2023-01-01 RX ADMIN — MAGNESIUM SULFATE 1 G: 1 INJECTION INTRAVENOUS at 11:06

## 2023-01-01 RX ADMIN — APIXABAN 5 MG: 5 TABLET, FILM COATED ORAL at 10:06

## 2023-01-01 RX ADMIN — FLECAINIDE ACETATE TABLET 50 MG: 50 TABLET ORAL at 10:06

## 2023-01-06 PROBLEM — N30.00 ACUTE CYSTITIS WITHOUT HEMATURIA: Status: ACTIVE | Noted: 2023-01-01

## 2023-01-06 PROBLEM — G93.41 ENCEPHALOPATHY, METABOLIC: Status: ACTIVE | Noted: 2023-01-01

## 2023-01-06 PROBLEM — Z71.89 ADVANCED CARE PLANNING/COUNSELING DISCUSSION: Status: ACTIVE | Noted: 2023-01-01

## 2023-01-11 NOTE — TELEPHONE ENCOUNTER
Pt Son, Isidro, states they need a hospital bed. States pt is about to be discharged from rehab to [facility]. They also have forms that need to be signed saying ok to COVID test for admission. Advised to drop forms off. He voiced understanding.     Please review

## 2023-01-12 NOTE — TELEPHONE ENCOUNTER
----- Message from Bria Brown sent at 1/12/2023 12:10 PM CST -----  Pt's son brought in a physician order for Dr Jasmine,, Gave to Aliya

## 2023-01-23 NOTE — TELEPHONE ENCOUNTER
----- Message from Aliya Mcgee MA sent at 1/23/2023  2:40 PM CST -----    ----- Message -----  From: Kaye Sen  Sent: 1/23/2023   2:33 PM CST  To: Misael Jasmine Staff    [facility] called to get an appt for pt for a nursing home [place]. [place]) [phone]

## 2023-01-23 NOTE — TELEPHONE ENCOUNTER
----- Message from Aliya Mcgee MA sent at 1/23/2023 10:12 AM CST -----    ----- Message -----  From: Analia Sethi  Sent: 1/23/2023  10:10 AM CST  To: Misael sarmiento [place] calling about an [place] form he has for her. Would also like to know if we ordered a hospital bed for her at [place]   [phone]

## 2023-01-23 NOTE — TELEPHONE ENCOUNTER
Spoke to pt son. He filled out [place] form. Needs provider signature. States he will drop off. Advised son Hospital bed orders have been faxed to [facility] [facility].

## 2023-01-23 NOTE — TELEPHONE ENCOUNTER
Please review. Hospital wanted pt on Lasix, but was set to print. Rx set up if ok. Please review.

## 2023-01-23 NOTE — TELEPHONE ENCOUNTER
----- Message from Aliya Mcgee MA sent at 1/23/2023  3:32 PM CST -----    ----- Message -----  From: Analia Sethi  Sent: 1/23/2023   3:30 PM CST  To: Misael Jasmine Staff    Tony sarmiento is calling and they need her rx's sent to medicine [facility] to be in a blister pack

## 2023-01-23 NOTE — TELEPHONE ENCOUNTER
----- Message from Aliya Mcgee MA sent at 1/23/2023 11:17 AM CST -----    ----- Message -----  From: Mimi Brower  Sent: 1/23/2023  11:17 AM CST  To: Misael Jasmine Staff    Pt dropped off form to be completed.  [phone]

## 2023-01-24 NOTE — TELEPHONE ENCOUNTER
----- Message from Crystal Muller LPN sent at 1/23/2023  3:05 PM CST -----  [facility] [[email or web address]]

## 2023-01-24 NOTE — LETTER
[address]  [place] [address]  Phone: [phone]   [phone]                        MD Delbert Ford MD Chequita Williams, MD Matthew Bassett, PA-C Allison Hoffritz, BINU Jones, BINU Dela Cruz, BINU      Date: 01/25/2023        Patient: Tierra Jennings  YOB: 1933      Please fax discharge records.         Sincerely,     Crystal Muller LPN  Electronically Signed By: Misael Jasmine MD

## 2023-01-26 NOTE — PROGRESS NOTES
Discharge Information     Discharge Date:   1/23/23    Primary Discharge Diagnosis:  Skilled, [facility]      Discharge Summary:  Requested      Medication & Order Review     Were medication changes made or new medications added?   Yes    If so, has the patient filled the prescriptions?  Yes     Was Home Health ordered? Yes    If so, has Home Health contacted patient and/or initiated services?  Yes    Name of Home Health Agency?  [facility]    Durable Medical Equipment ordered?  No     If so, has the DME provider contacted patient and delivered equipment?  N/A    Follow Up               Any problems since discharge? No    How is the patient feeling since returning home?      Have you set up recommended follow up appointments?  Yes    Schedule Hospital Follow-up appointment within 7-14 days (preferably 7).      Notes:  Spoke to pt son. Pt states pt is not at [place] and doing great. States he was told that Dr. Jasmine would come see her in [place].       Does not want to schedule appt is office. Advised not sure when next rounds will be. Son voiced understanding. States pt is doing well and does not need to be seen soon.       Crystal Muller

## 2023-01-26 NOTE — TELEPHONE ENCOUNTER
Spoke to pt son. Pt states pt is not at [place] and doing great. States he was told that Dr. Jasmine would come see her in [place].       Does not want to schedule appt is office. Advised not sure when next rounds will be. Son voiced understanding. States pt is doing well and does not need to be seen soon. Printed for Dr. Jasmine

## 2023-01-30 NOTE — TELEPHONE ENCOUNTER
----- Message from Analia Sethi sent at 1/30/2023  1:05 PM CST -----  - son torsten is calling to see if dr. Jasmine can fill [place] forms when he [place] rounds at [place] or if he needs to bring her in the office to have filled out.   [phone]

## 2023-01-30 NOTE — TELEPHONE ENCOUNTER
Spoke with patients son who is going to bring the forms by the office so we can let him know if they can be filled out or if we need an OV

## 2023-01-31 NOTE — TELEPHONE ENCOUNTER
On Dr. Vincent mauro   Full range of motion of upper and lower extremities, no joint tenderness/swelling.

## 2023-01-31 NOTE — TELEPHONE ENCOUNTER
----- Message from Mimi Brower sent at 1/31/2023 11:49 AM CST -----  Pt's son dropped off paperwork to be completed.  [phone]

## 2023-02-13 NOTE — TELEPHONE ENCOUNTER
"LMOR for patients son to call back.     Per Dr. Jasmine "forms will be ready Tuesday Morning. He should get her some LacHydrin 12% lotion to use daily on her lower legs."   "

## 2023-02-13 NOTE — TELEPHONE ENCOUNTER
----- Message from Analia Sethi sent at 2/13/2023 11:38 AM CST -----  Vm- pt son torsten is calling to check the status of the forms he dropped off   [phone]

## 2023-02-13 NOTE — TELEPHONE ENCOUNTER
[facility] faxed over med list. Med list updated. Scanned to pt chart. Also faxed updated med list to [facility]. Per Dr. Jasmine. Pt should be on lasix.

## 2023-02-14 NOTE — TELEPHONE ENCOUNTER
----- Message from Kaye Sen sent at 2/14/2023 10:30 AM CST -----  Pt son returning a phone call. (mark) [phone]

## 2023-02-20 NOTE — LETTER
[address]  [place] [address]  Phone: [phone]   [phone]                        MD Delbert Ford, MD Spencer Dean PA-C Allison Hoffritz, BINU Jones, BINU Dela Cruz, BINU      Date: 02/20/2023        Patient: Tierra Jennings  YOB: 1933      Per Dr. Jasmine Wound care orders.       - Cleanse with wound cleanser  - Mupirocen 2% ointment daily  - Float heals off bed at night to prevent ulceration.         Sincerely,     Electronically Signed By: Misael Jasmine MD

## 2023-02-20 NOTE — TELEPHONE ENCOUNTER
Per Dr. Jasmine   - Cleanse with wound cleanser  -Mupirocen 2% ointment daily  -Float heals off bed at night to prevent ulceration.     LMOR for [facility]. Order faxed to . Let Madyson with  know about orders.

## 2023-02-20 NOTE — TELEPHONE ENCOUNTER
----- Message from Kaye Sen sent at 2/20/2023  3:58 PM CST -----  [facility] called and pt have a open wound on left heel. And would like to get an wound care order. And they would also like to know if its any prevention for rt heel. ([facility]) [phone]

## 2023-02-22 NOTE — TELEPHONE ENCOUNTER
----- Message from Aliya Mcgee MA sent at 2/22/2023 11:41 AM CST -----    ----- Message -----  From: Danielle Briggs  Sent: 2/22/2023  11:06 AM CST  To: Misael Gómez  with [facility]  wants to speak to Crystal about this patient. Dalia's # [phone]

## 2023-02-22 NOTE — TELEPHONE ENCOUNTER
Dalia with [facility] states they are unable to go out and see the pt daily for wound care. Wants to see if they can change the order to tree times a week.     Per Maida, of for 3 times a week.     Nurse Dalia notified and voiced understanding.  Also faxed updated order.

## 2023-02-22 NOTE — LETTER
[address]  [place] [address]  Phone: [phone]   [phone]                        MD Delbert Ford, MD Spencer Dean PA-C Allison Hoffritz, BINU [place] NP      Date: 02/22/2023        Patient: Tierra Jennings  YOB: 1933      Per Maida Wound care orders.         - Cleanse with wound cleanser  - Mupirocen 2% ointment 3 times a week  - Float heals off bed at night to prevent ulceration        Electronically Signed By: Maida Jones NP

## 2023-03-21 NOTE — TELEPHONE ENCOUNTER
----- Message from Danielle Briggs sent at 3/21/2023  3:58 PM CDT -----  VM 3:28  She is in [place]. They suspect she has  a UTI.  They would like a Urine ordered. Someone was calling for Isidro her son. Isidro's #   [phone]

## 2023-04-10 PROBLEM — N17.9 ACUTE KIDNEY FAILURE: Status: RESOLVED | Noted: 2021-09-05 | Resolved: 2023-01-01

## 2023-04-10 NOTE — TELEPHONE ENCOUNTER
Spoke with Elaina and let her know they were sent to the pharmacy. Elaina called back to confirm the pharmacy has the script they were just closed Friday and do not deliver on Saturday/Sundays.

## 2023-04-10 NOTE — TELEPHONE ENCOUNTER
Elaina from [facility] calling.  Call back [phone].  Patient needs refill on Tambocore 50mg bid.  [facility].

## 2023-04-19 NOTE — TELEPHONE ENCOUNTER
[facility] requesting for PRN orders for Melatonin. Per Dr. Kenroy figueroa for PRN orders. Med added to pt chart.

## 2023-05-19 NOTE — TELEPHONE ENCOUNTER
[facility] sent fax for rx refills. Rx set up. [facility].       Spoke with Meds pass nurse at [place]. Olmesartan ans statin not on pt med list. States pt has been getting med. Looking further into chart. Med list was not updated. Dr. Jasmine signed order to continue. Epic med list updated. Can you sign.?

## 2023-06-05 PROBLEM — I50.9 NEW ONSET OF CONGESTIVE HEART FAILURE: Status: ACTIVE | Noted: 2023-01-01

## 2023-06-05 PROBLEM — I48.19 PERSISTENT ATRIAL FIBRILLATION: Chronic | Status: ACTIVE | Noted: 2020-11-06

## 2023-06-05 NOTE — Clinical Note
Diagnosis: Chest pain [357853]   Future Attending Provider: LAURA HEMPHILL [[id number]]   Admitting Provider:: LUARA HEMPHILL [[id number]]

## 2023-06-05 NOTE — ED NOTES
Patient arrives on stretcher via EMS. Patient Awake, Alert, NAD. Patient with weakness x 3-4 days, c/o today of chest pain. Contact precautions noted as patient is from [facility].

## 2023-06-05 NOTE — ED PROVIDER NOTES
Encounter Date: 6/5/2023       History     Chief Complaint   Patient presents with    Chest Pain     Pt arrived via EMS from [facility] assisted living complained generalized weakness x3-4 days and today complained of chest pain.      Patient presents emergency chest with associated shortness of breath no fever chills she does report occasional cough states symptoms began while showering assisted living personnel report patient has been complain of generalized weakness over last 3-4 days but patient reports as a 1st time she had chest pain he does have a history of AFib is on Eliquis      Review of patient's allergies indicates:   Allergen Reactions    Aspirin Other (See Comments)     Bleeding    Codeine     Erythromycin Rash     Past Medical History:   Diagnosis Date    Diabetes mellitus, type 2     Heart murmur     History of psychiatric hospitalization     Hypertension     Paroxysmal atrial fibrillation     Stroke      Past Surgical History:   Procedure Laterality Date    EYE SURGERY      TONSILLECTOMY      VEIN LIGATION AND STRIPPING       Family History   Problem Relation Age of Onset    Heart disease Mother      Social History     Tobacco Use    Smoking status: Never    Smokeless tobacco: Never   Substance Use Topics    Alcohol use: No    Drug use: No     Review of Systems   Constitutional:  Negative for chills, diaphoresis and fever.   HENT:  Negative for congestion and hearing loss.    Respiratory:  Positive for shortness of breath.    Cardiovascular:  Positive for chest pain and leg swelling. Negative for palpitations.   Gastrointestinal:  Negative for abdominal pain, diarrhea, nausea and vomiting.   Genitourinary:  Negative for dysuria.     Physical Exam     Initial Vitals [06/05/23 1506]   BP Pulse Resp Temp SpO2   103/62 103 18 97.4 °F (36.3 °C) 95 %      MAP       --         Physical Exam    Constitutional: She appears well-developed and well-nourished. No distress.   HENT:   Head: Normocephalic and  atraumatic.   Right Ear: External ear normal.   Left Ear: External ear normal.   Mouth/Throat: Oropharynx is clear and moist.   Eyes: Conjunctivae and EOM are normal. Pupils are equal, round, and reactive to light.   Neck: Neck supple.   Normal range of motion.  Cardiovascular:  Normal rate, normal heart sounds and intact distal pulses.           Irregularly irregular   Pulmonary/Chest: No respiratory distress. She has wheezes. She has rales.   Abdominal: Abdomen is soft. Bowel sounds are normal. There is no abdominal tenderness.   Musculoskeletal:         General: No edema. Normal range of motion.      Cervical back: Normal range of motion and neck supple.     Neurological: She is alert and oriented to person, place, and time. She has normal strength. GCS score is 15. GCS eye subscore is 4. GCS verbal subscore is 5. GCS motor subscore is 6.   Skin: Skin is warm and dry. Capillary refill takes less than 2 seconds. No rash noted.   Psychiatric: She has a normal mood and affect. Her behavior is normal.       ED Course   Procedures  Labs Reviewed   CBC W/ AUTO DIFFERENTIAL - Abnormal; Notable for the following components:       Result Value    RBC 3.36 (*)     Hemoglobin 9.9 (*)     Hematocrit 31.4 (*)     MCHC 31.5 (*)     RDW 15.9 (*)     Immature Granulocytes 0.6 (*)     Immature Grans (Abs) 0.06 (*)     Gran % 75.1 (*)     Lymph % 14.8 (*)     All other components within normal limits   COMPREHENSIVE METABOLIC PANEL - Abnormal; Notable for the following components:    CO2 22 (*)     BUN 49 (*)     Calcium 8.5 (*)     Albumin 3.1 (*)     Total Bilirubin 1.6 (*)     Anion Gap 6 (*)     eGFR 35.7 (*)     All other components within normal limits   TROPONIN I HIGH SENSITIVITY - Abnormal; Notable for the following components:    Troponin I High Sensitivity 238.2 (*)     All other components within normal limits    Narrative:     Trop critical result(s) repeated. Called and verbal readback obtained   from Jeanette aponte  RN/ed by TY 06/05/2023 17:01   B-TYPE NATRIURETIC PEPTIDE - Abnormal; Notable for the following components:    BNP 1,294 (*)     All other components within normal limits   MAGNESIUM   TROPONIN I HIGH SENSITIVITY        ECG Results              EKG 12-lead (In process)  Result time 06/05/23 16:05:40      In process by Interface, Lab In [facility] (06/05/23 16:05:40)                   Narrative:    Test Reason : R07.9,    Vent. Rate : 092 BPM     Atrial Rate : 104 BPM     P-R Int : 000 ms          QRS Dur : 104 ms      QT Int : 366 ms       P-R-T Axes : 000 005 174 degrees     QTc Int : 452 ms    Atrial fibrillation with premature ventricular or aberrantly conducted  complexes  Low voltage QRS  Septal infarct (cited on or before 13-DEC-2022)  ST and T wave abnormality, consider inferolateral ischemia  Abnormal ECG  When compared with ECG of 13-DEC-2022 02:57,  Significant changes have occurred    Referred By: [email or web address]   SELF           Confirmed By:                       In process by Interface, Lab In [facility] (06/05/23 15:59:28)                   Narrative:    Test Reason : R07.9,    Vent. Rate : 092 BPM     Atrial Rate : 104 BPM     P-R Int : 000 ms          QRS Dur : 104 ms      QT Int : 366 ms       P-R-T Axes : 000 005 174 degrees     QTc Int : 452 ms    Atrial fibrillation with premature ventricular or aberrantly conducted  complexes  Low voltage QRS  Septal infarct (cited on or before 13-DEC-2022)  ST and T wave abnormality, consider inferolateral ischemia  Abnormal ECG  When compared with ECG of 13-DEC-2022 02:57,  Significant changes have occurred    Referred By: [email or web address]   SELF           Confirmed By:                                   Imaging Results              X-Ray Chest AP Portable (Final result)  Result time 06/05/23 16:08:48      Final result by Eric Vidal MD (06/05/23 16:08:48)                   Narrative:    HISTORY: Acute chest pain and generalized weakness.    FINDINGS: Portable chest  radiograph at 1551 hours compared to prior exams shows stable enlarged cardiac silhouette, with the central pulmonary vasculature are within normal limits. There are aortic vascular calcifications.    There are bilateral lower lung airspace opacities and hazy densities, obscuring the diaphragm and the lower cardiac margins. The upper lungs are normally aerated, with no evidence of interstitial pulmonary edema or pneumothorax. The bones are diffusely osteopenic, with no acute fractures or destructive osseous lesions.    IMPRESSION: Stable cardiomegaly, with bilateral lower lung atelectasis and or airspace disease, and bilateral pleural effusions.    Electronically signed by:  Eric Vidal MD  6/5/2023 4:08 PM CDT Workstation: [id number]                                     Medications   aspirin chewable tablet 162 mg (has no administration in time range)   furosemide injection 40 mg (40 mg Intravenous Given 6/5/23 1806)     Medical Decision Making:   Clinical Tests:   Lab Tests: Ordered and Reviewed  Radiological Study: Ordered and Reviewed  Medical Tests: Ordered and Reviewed  ED Management:  Laboratory evaluation reviewed EKG shows AFib with nonspecific ST changes patient's BNP is elevated troponin is elevated she received aspirin and 40 mg of Lasix IV I have discussed patient's findings with Ms. Cyndy SCHMID who will evaluate patient in the emergency department for admission                        Clinical Impression:   Final diagnoses:  [R07.9] Chest pain               Devang Cisneros MD  06/05/23 2677

## 2023-06-05 NOTE — FIRST PROVIDER EVALUATION
"Medical screening examination initiated.  I have conducted a focused provider triage encounter, findings are as follows:    Brief history of present illness:  Presents with chest pain without shortness of breath.  Decreased appetite.  Onset today.    Vitals:    06/05/23 1506   BP: 103/62   BP Location: Right arm   Patient Position: Sitting   Pulse: 103   Resp: 18   Temp: 97.4 °F (36.3 °C)   TempSrc: Oral   SpO2: 95%   Weight: 90.7 kg (200 lb)   Height: 5' 6" (1.676 m)       Pertinent physical exam:  Heart rate mildly tachycardic.  Patient is alert awake and oriented.  She answers all questions appropriately.    Brief workup plan:      Preliminary workup initiated; this workup will be continued and followed by the physician or advanced practice provider that is assigned to the patient when roomed.  "

## 2023-06-06 PROBLEM — I50.43 ACUTE ON CHRONIC COMBINED SYSTOLIC AND DIASTOLIC HEART FAILURE: Status: ACTIVE | Noted: 2023-01-01

## 2023-06-06 PROBLEM — J81.1 PULMONARY EDEMA: Status: ACTIVE | Noted: 2023-01-01

## 2023-06-06 PROBLEM — T07.XXXA WOUNDS, MULTIPLE: Chronic | Status: ACTIVE | Noted: 2023-01-01

## 2023-06-06 NOTE — PHARMACY MED REC
"              .  Admission Medication History     The home medication history was taken by Mi Johnson.    You may go to "Admission" then "Reconcile Home Medications" tabs to review and/or act upon these items.     The home medication list has been updated by the Pharmacy department.   Please read ALL comments highlighted in yellow.   Please address this information as you see fit.    Feel free to contact us if you have any questions or require assistance.      The medications listed below were removed from the home medication list. Please reorder if appropriate:  Patient reports no longer taking the following medication(s):  Clotrimazole Ointment  Clindamycin  Hydrocortisone Cream  Mupirocin Ointment  Zinc Ointment    Medications listed below were obtained from: Patient/family  No current facility-administered medications on file prior to encounter.     Current Outpatient Medications on File Prior to Encounter   Medication Sig Dispense Refill    apixaban (ELIQUIS) 5 mg Tab Take 1 tablet (5 mg total) by mouth 2 (two) times daily. (Patient taking differently: Take 5 mg by mouth 2 (two) times daily. 0800  1800) 60 tablet 5    atorvastatin (LIPITOR) 40 MG tablet Take 1 tablet (40 mg total) by mouth once daily. (Patient taking differently: Take 40 mg by mouth every evening.) 90 tablet 1    flecainide (TAMBOCOR) 50 MG Tab Take 1 tablet (50 mg total) by mouth every 12 (twelve) hours. (Patient taking differently: Take 50 mg by mouth every 12 (twelve) hours. 0800  1800) 60 tablet 5    furosemide (LASIX) 40 MG tablet Take 1 tablet (40 mg total) by mouth once daily. Hold and restart Monday 01/09/23 90 tablet 1    glipiZIDE (GLUCOTROL) 2.5 MG TR24 Take 1 tablet (2.5 mg total) by mouth daily with breakfast. 90 tablet 1    melatonin 10 mg Tab Take 10 mg by mouth nightly as needed.      metFORMIN (GLUCOPHAGE-XR) 500 MG ER 24hr tablet Take 1 tablet (500 mg total) by mouth daily with breakfast. 90 tablet 1    metoprolol " succinate (TOPROL-XL) 25 MG 24 hr tablet Take 0.5 tablets (12.5 mg total) by mouth once daily. 45 tablet 1    olmesartan (BENICAR) 40 MG tablet Take 1 tablet (40 mg total) by mouth once daily. (Patient taking differently: Take 40 mg by mouth every evening.) 90 tablet 1    potassium chloride SA (K-DUR,KLOR-CON M) 10 MEQ tablet Take 1 tablet (10 mEq total) by mouth once daily. 90 tablet 1    blood sugar diagnostic (ONETOUCH ULTRA TEST) Strp 1 strip by miscellaneous route 2 times daily. DX code 250.00 (Patient taking differently: 1 strip by Misc.(Non-Drug; Combo Route) route 2 (two) times daily. 1 strip by miscellaneous route 2 times daily. DX code 250.00) 100 strip 1    blood sugar diagnostic Strp To check BG 2 times daily, to use with insurance preferred meter 100 each 1    [DISCONTINUED] clindamycin (CLEOCIN) 300 MG capsule Take 1 capsule (300 mg total) by mouth 3 (three) times daily. 21 capsule 0    [DISCONTINUED] clotrimazole (LOTRIMIN) 1 % cream Apply 1 ampule topically 3 (three) times daily.      [DISCONTINUED] hydrocortisone 2.5 % cream Apply topically 2 (two) times daily. 28 g 2    [DISCONTINUED] melatonin (MELATIN) 5 mg Take 1 tablet by mouth every evening.      [DISCONTINUED] mupirocin (BACTROBAN) 2 % ointment Apply topically 3 (three) times daily. (Patient taking differently: Apply 1 g topically 3 (three) times daily.) 30 g 2    [DISCONTINUED] zinc oxide 16 % Oint ointment Apply 1 ampule topically once daily.             Mi Johnson  EXT 1924

## 2023-06-06 NOTE — PLAN OF CARE
[facility]  Initial Discharge Assessment       Primary Care Provider: Misael Jasmine MD    Admission Diagnosis: Chest pain [R07.9]    Admission Date: 6/5/2023  Expected Discharge Date: 6/7/2023    Transition of Care Barriers: None    Payor: MEDICARE / Plan: MEDICARE PART A & B / Product Type: Government /     Extended Emergency Contact Information  Primary Emergency Contact: Isidro Jennings  Mobile Phone: [phone]  Relation: Son  Secondary Emergency Contact: Jody Jennings  Address: [address] [place] [address] [facility]  Home Phone: [phone]  Work Phone: [phone]  Mobile Phone: [phone]  Relation: Daughter  Preferred language: English   needed? No    Discharge Plan A: Home Health  Discharge Plan B: Skilled Nursing Facility    Completed DC assessment with patient and daughter in law at bedside. Verified information on facesheet as correct. Listed address is mailing . Patients lives at [facility]. Verified PCP.. reports that he comes to [place] to see her. Unsure of pharmacy-states [facility] manages and gives medications. Denies HH/HD/outpt services. On eliquis prior to admission. DME- WC, glucometer, rollator. Uses WC mainly for ambulation.. not able to walk. Needs stand by assistance for WC transfers. Family provides transportation to [facility] and will provide transportation home upon DC. Denies recent inpt stay in last 30 days. Verified insurance on file. Family mentioned SNF VS HH upon DC (was at [facility] previously); if goes with HH, wants company affiliated with Dr. Jasmine. CM following.     Initial Assessment (most recent)       Adult Discharge Assessment - 06/06/23 1026          Discharge Assessment    Assessment Type Discharge Planning Assessment     Confirmed/corrected address, phone number and insurance Yes     Confirmed Demographics Correct on Facesheet     Source of Information patient;family     Communicated JORGE with  patient/caregiver Yes     Reason For Admission new CHF     People in Home facility resident     Facility Arrived From: [facility]     Do you expect to return to your current living situation? Yes     Do you have help at home or someone to help you manage your care at home? Yes     Who are your caregiver(s) and their phone number(s)? staff @ [facility]     Prior to hospitilization cognitive status: Alert/Oriented     Current cognitive status: Alert/Oriented     Walking or Climbing Stairs ambulation difficulty, requires equipment;ambulation difficulty, assistance 1 person     Mobility Management WC     Dressing/Bathing bathing difficulty, requires equipment;bathing difficulty, assistance 1 person     Equipment Currently Used at Home wheelchair;rollator;glucometer     Readmission within 30 days? No     Patient currently being followed by outpatient case management? No     Do you currently have service(s) that help you manage your care at home? No     Do you take prescription medications? Yes     Do you have prescription coverage? Yes     Coverage doesn't remember     Do you have any problems affording any of your prescribed medications? No     Is the patient taking medications as prescribed? yes     Who is going to help you get home at discharge? family     How do you get to doctors appointments? family or friend will provide     Are you on dialysis? No     Do you take coumadin? No     Discharge Plan A Home Health     Discharge Plan B Skilled Nursing Facility     DME Needed Upon Discharge  none     Discharge Plan discussed with: Adult children;Patient     Transition of Care Barriers None

## 2023-06-06 NOTE — ASSESSMENT & PLAN NOTE
Patient's FSGs are controlled on current medication regimen.  Last A1c reviewed-   Lab Results   Component Value Date    HGBA1C 5.8 (H) 12/13/2022     Most recent fingerstick glucose reviewed- No results for input(s): POCTGLUCOSE in the last 24 hours.  Current correctional scale  Low  Maintain anti-hyperglycemic dose as follows-   Antihyperglycemics (From admission, onward)    None        POCT/sliding scale   Hold Oral hypoglycemics while patient is in the hospital.  A1c

## 2023-06-06 NOTE — HPI
Patient is a 89-year-old female lives at a facility and a DNR with history of AFib on Eliquis b.i.d., hypertension, diabetes type 2, chronic bilateral lower edema, vascular dementia, stroke.  She states the ED with complaints of chest pain, shortness a breath and weakness started today.  She was evaluated in ED with troponin of 238.2, BNP >1200, hemoglobin 9.9, hematocrit 31.4, creatinine 1.4.  Chest x-ray positive for bilateral pleural effusions, IV for 40 of Lasix ordered.    On assessment patient is tachypneic, on 2 L oxygen to keep sats > 90, reports nonradiating chest pressure midsternal which started last night.  She states she woke up today with increased shortness of breath, tachypnea was placed on oxygen at facility.  Her daughter is at bedside who works with Dr. Gonzalez who states he will see patient in a.m. She reports normally she is followed by cardiologist Dr. Diaz.  According to daughter patient does have a history of congestive heart failure.  She denies lightheadedness, dizziness, nausea, vomiting, fever, chills.  She denies smoking, illicit drugs, alcohol.      Hospitalist asked to admit for new onset acute CHF vs pulmonary edema, chest pain, acute hypoxia    IMPRESSION: 6/5/23  Stable cardiomegaly, with bilateral lower lung atelectasis and or airspace disease, and bilateral pleural effusions.     Summary 12/2022    The estimated ejection fraction is 55%.  The left ventricle is normal in size with normal systolic function.  Severe left atrial enlargement.  Indeterminate left ventricular diastolic function.  Mild right ventricular enlargement.  Severe right atrial enlargement.  Mild tricuspid regurgitation.  Mild mitral regurgitation.  Intermediate central venous pressure (8 mmHg).  The estimated PA systolic pressure is 36 mmHg.

## 2023-06-06 NOTE — PROGRESS NOTES
"[facility]  Adult Nutrition   Consult Note (Nutrition Education)     SUMMARY     Recommendations  Recommendation/Intervention:   1. Continue with current Diet as tolerated   2. Nutrition Education - MNT for Wounds/Pressure Ulcers   3. Add ONS, Rafael BID x 14 days to aid with wound healing    Goals:   1. PO intake >75% to meet EEN and EPN   2. Progression of wound healing  Nutrition Goal Status: new    Dietitian Rounds Brief  Pt is a 89 y/o F who presented to the ED from assisted living facility with c/o chest pain, shortness of breath, and generalized weakness x 3-4 days. Multiple contusions x 4 extremities in various stages of healing with bruising. Bilateral knee contusions with bruising, reports fall in shower several days ago. Painful rash under left breast.  Patient reports pressure ulcer on sacrum, reported to admitting MD. RD attempted to speak with pt at bedside to deliver nutrition education for wound healing, but pt was asleep. Nutrition education handout was left with nurse to be given to pt, RD to f/u tomorrow to attempt to deliver verbal nutrition education, and assess intake and tolerance.           Diet order:   Current Diet Order: Cardiac Diet with Diabetic Restrictions; 2000ml FR                 Evaluation of Received Nutrient/Fluid Intake        % Intake of Estimated Energy Needs: Other pt asleep during attempted visit  % Meal Intake: Other: pt asleep during attempted visit       Intake/Output Summary (Last 24 hours) at 6/6/2023 1405  Last data filed at 6/6/2023 0604  Gross per 24 hour   Intake 370 ml   Output 350 ml   Net 20 ml        Anthropometrics  Temp: 97.6 °F (36.4 °C)  Height: 5' 6" (167.6 cm)  Height (inches): 66 in  Weight Method: Bed Scale  Weight: 75.4 kg (166 lb 3.6 oz)  Weight (lb): 166.23 lb  Ideal Body Weight (IBW), Female: 130 lb  % Ideal Body Weight, Female (lb): 127.87 %  BMI (Calculated): 26.8       Estimated/Assessed Needs  Weight Used For Calorie Calculations: " 75.4 kg (166 lb 3.6 oz)  Energy Calorie Requirements (kcal): 6933-5260 (20-25 kcal/kg)  Energy Need Method: Kcal/kg  Protein Requirements: 76 - 91 (1.0-1.2 g/kg)  Weight Used For Protein Calculations: 75.4 kg (166 lb 3.6 oz)  Fluid Requirements (mL): 2000  Estimated Fluid Requirement Method: other (see comments) (per MD)  RDA Method (mL): 1508       Reason for Assessment  Reason For Assessment: consult (Wound Healing)  Diagnosis: other (see comments) (New Onset Congestive Heart Failure)  Relevant Medical History: Afib, HTN, T2DM, chronic bilateral lower extremity edema, vascular dementia, stroke  Interdisciplinary Rounds: did not attend    Nutrition/Diet History  Food Allergies: NKFA    Nutrition Risk Screen  Nutrition Risk Screen: no indicators present       Altered Skin Integrity 06/05/23 2000 Sacral spine Intact skin with non-blanchable redness of localized area-Wound Image: Images linked       Altered Skin Integrity 06/05/23 2000 Left lower;anterior Arm Skin Tear-Wound Image: Images linked       Altered Skin Integrity 06/05/23 2000 Right anterior Knee Abrasion(s)-Wound Image: Images linked  MST Score: 0  Have you recently lost weight without trying?: No  Weight loss score: 0  Have you been eating poorly because of a decreased appetite?: No  Appetite score: 0       Weight History:  Wt Readings from Last 5 Encounters:   06/05/23 75.4 kg (166 lb 3.6 oz)   01/07/23 81 kg (178 lb 9.2 oz)   12/13/22 80 kg (176 lb 5.9 oz)   12/09/22 86.2 kg (190 lb)   12/09/22 86.2 kg (190 lb 0.6 oz)        Lab/Procedures/Meds: Pertinent Labs/Meds Reviewed    Medications:Pertinent Medications Reviewed  Scheduled Meds:   apixaban  5 mg Oral BID    furosemide (LASIX) injection  40 mg Intravenous Daily    metoprolol succinate  12.5 mg Oral Daily    nitroGLYCERIN 2% TD oint  0.5 inch Topical (Top) Q6H     Continuous Infusions:  PRN Meds:.acetaminophen, acetaminophen, dextrose 50%, dextrose 50%, glucagon (human recombinant), glucose,  glucose, magnesium oxide, magnesium oxide, melatonin, ondansetron, potassium bicarbonate, potassium bicarbonate, potassium bicarbonate, potassium, sodium phosphates, potassium, sodium phosphates, potassium, sodium phosphates, prochlorperazine, sodium chloride 0.9%    Labs: Pertinent Labs Reviewed  Clinical Chemistry:  Recent Labs   Lab 06/05/23  1557 06/06/23  0511    140   K 4.5 4.0    108   CO2 22* 23   GLU 77 66*   BUN 49* 49*   CREATININE 1.4 1.3   CALCIUM 8.5* 8.4*   PROT 6.8 6.5   ALBUMIN 3.1* 2.9*   BILITOT 1.6* 1.2*   ALKPHOS 79 82   AST 22 21   ALT 18 17   ANIONGAP 6* 9   MG 1.7  --      CBC:   Recent Labs   Lab 06/06/23  0511   WBC 10.10   RBC 3.34*   HGB 10.0*   HCT 31.1*      MCV 93   MCH 29.9   MCHC 32.2     Lipid Panel:  Recent Labs   Lab 06/06/23  0511   CHOL 76*   HDL 28*   LDLCALC 32.2*   TRIG 79   CHOLHDL 36.8     Cardiac Profile:  Recent Labs   Lab 06/05/23  1557   BNP 1,294*     Inflammatory Labs:  No results for input(s): CRP in the last 168 hours.  Diabetes:  Recent Labs   Lab 06/06/23  0511   HGBA1C 5.6     Thyroid & Parathyroid:  Recent Labs   Lab 06/06/23  0511   TSH 4.180       Monitor and Evaluation  Food and Nutrient Intake: food and beverage intake  Food and Nutrient Adminstration: diet order  Knowledge/Beliefs/Attitudes: food and nutrition knowledge/skill  Physical Activity and Function: nutrition-related ADLs and IADLs  Anthropometric Measurements: height/length, weight, weight change, body mass index  Biochemical Data, Medical Tests and Procedures: electrolyte and renal panel, gastrointestinal profile, glucose/endocrine profile, inflammatory profile, lipid profile  Nutrition-Focused Physical Findings: overall appearance     Nutrition Risk  Level of Risk/Frequency of Follow-up: moderate     Nutrition Follow-Up  RD Follow-up?: Yes    Yasir Gutierrez, Dietetic Intern       Cuca Jean Baptiste, SATISH, LDN 06/06/2023 2:05 PM

## 2023-06-06 NOTE — ASSESSMENT & PLAN NOTE
skin tears, chronic wounds  Consult wound care  Multiple wounds bilateral upper and lower extremities

## 2023-06-06 NOTE — ASSESSMENT & PLAN NOTE
Chest x-ray shows bilateral pleural effusion  Follow echo  Consider CT of chest  Continue IV Lasix  Repeat two view chest x-ray a.m.

## 2023-06-06 NOTE — H&P
[facility]  History & Physical    Patient Name: Tierra Jennings  MRN: [id number]  Patient Class: IP- Inpatient  Admission Date: 6/5/2023  Attending Physician: Praveen Davila MD   Primary Care Provider: Misael Jasmine MD         Patient information was obtained from patient, relative(s) and ER records.     Subjective:     Principal Problem:New onset of congestive heart failure    Chief Complaint:   Chief Complaint   Patient presents with    Chest Pain     Pt arrived via EMS from [facility] complained generalized weakness x3-4 days and today complained of chest pain.         HPI: Patient is a 89-year-old female lives at a facility and a DNR with history of AFib on Eliquis b.i.d., hypertension, diabetes type 2, chronic bilateral lower edema, vascular dementia, stroke.  She states the ED with complaints of chest pain, shortness a breath and weakness started today.  She was evaluated in ED with troponin of 238.2, BNP >1200, hemoglobin 9.9, hematocrit 31.4, creatinine 1.4.  Chest x-ray positive for bilateral pleural effusions, IV for 40 of Lasix ordered.    On assessment patient is tachypneic, on 2 L oxygen to keep sats > 90, reports nonradiating chest pressure midsternal which started last night.  She states she woke up today with increased shortness of breath, tachypnea was placed on oxygen at facility.  Her daughter is at bedside who works with Dr. Gonzalez who states he will see patient in a.m. She reports normally she is followed by cardiologist Dr. Diaz.  According to daughter patient does have a history of congestive heart failure.  She denies lightheadedness, dizziness, nausea, vomiting, fever, chills.  She denies smoking, illicit drugs, alcohol.      Hospitalist asked to admit for new onset acute CHF vs pulmonary edema, chest pain, acute hypoxia    IMPRESSION: 6/5/23  Stable cardiomegaly, with bilateral lower lung atelectasis and or airspace disease, and  bilateral pleural effusions.     Summary 12/2022    The estimated ejection fraction is 55%.  The left ventricle is normal in size with normal systolic function.  Severe left atrial enlargement.  Indeterminate left ventricular diastolic function.  Mild right ventricular enlargement.  Severe right atrial enlargement.  Mild tricuspid regurgitation.  Mild mitral regurgitation.  Intermediate central venous pressure (8 mmHg).  The estimated PA systolic pressure is 36 mmHg.           Past Medical History:   Diagnosis Date    Diabetes mellitus, type 2     Heart murmur     History of psychiatric hospitalization     Hypertension     Paroxysmal atrial fibrillation     Stroke        Past Surgical History:   Procedure Laterality Date    EYE SURGERY      TONSILLECTOMY      VEIN LIGATION AND STRIPPING         Review of patient's allergies indicates:   Allergen Reactions    Aspirin Other (See Comments)     Bleeding    Codeine     Erythromycin Rash       No current facility-administered medications on file prior to encounter.     Current Outpatient Medications on File Prior to Encounter   Medication Sig    apixaban (ELIQUIS) 5 mg Tab Take 1 tablet (5 mg total) by mouth 2 (two) times daily. (Patient taking differently: Take 5 mg by mouth 2 (two) times daily. 0800  1800)    atorvastatin (LIPITOR) 40 MG tablet Take 1 tablet (40 mg total) by mouth once daily. (Patient taking differently: Take 40 mg by mouth every evening.)    flecainide (TAMBOCOR) 50 MG Tab Take 1 tablet (50 mg total) by mouth every 12 (twelve) hours. (Patient taking differently: Take 50 mg by mouth every 12 (twelve) hours. 0800  1800)    furosemide (LASIX) 40 MG tablet Take 1 tablet (40 mg total) by mouth once daily. Hold and restart Monday 01/09/23    glipiZIDE (GLUCOTROL) 2.5 MG TR24 Take 1 tablet (2.5 mg total) by mouth daily with breakfast.    melatonin 10 mg Tab Take 10 mg by mouth nightly as needed.    metFORMIN (GLUCOPHAGE-XR) 500 MG ER 24hr tablet Take 1 tablet  (500 mg total) by mouth daily with breakfast.    metoprolol succinate (TOPROL-XL) 25 MG 24 hr tablet Take 0.5 tablets (12.5 mg total) by mouth once daily.    olmesartan (BENICAR) 40 MG tablet Take 1 tablet (40 mg total) by mouth once daily. (Patient taking differently: Take 40 mg by mouth every evening.)    potassium chloride SA (K-DUR,KLOR-CON M) 10 MEQ tablet Take 1 tablet (10 mEq total) by mouth once daily.    blood sugar diagnostic (ONETOUCH ULTRA TEST) Strp 1 strip by miscellaneous route 2 times daily. DX code 250.00 (Patient taking differently: 1 strip by Misc.(Non-Drug; Combo Route) route 2 (two) times daily. 1 strip by miscellaneous route 2 times daily. DX code 250.00)    blood sugar diagnostic Strp To check BG 2 times daily, to use with insurance preferred meter    [DISCONTINUED] clindamycin (CLEOCIN) 300 MG capsule Take 1 capsule (300 mg total) by mouth 3 (three) times daily.    [DISCONTINUED] clotrimazole (LOTRIMIN) 1 % cream Apply 1 ampule topically 3 (three) times daily.    [DISCONTINUED] hydrocortisone 2.5 % cream Apply topically 2 (two) times daily.    [DISCONTINUED] melatonin (MELATIN) 5 mg Take 1 tablet by mouth every evening.    [DISCONTINUED] mupirocin (BACTROBAN) 2 % ointment Apply topically 3 (three) times daily. (Patient taking differently: Apply 1 g topically 3 (three) times daily.)    [DISCONTINUED] zinc oxide 16 % Oint ointment Apply 1 ampule topically once daily.     Family History       Problem Relation (Age of Onset)    Heart disease Mother          Tobacco Use    Smoking status: Never    Smokeless tobacco: Never   Substance and Sexual Activity    Alcohol use: No    Drug use: No    Sexual activity: Not Currently     Review of Systems   Constitutional:  Negative for activity change, appetite change, chills, diaphoresis, fatigue, fever and unexpected weight change.   HENT:  Negative for congestion, dental problem, facial swelling, nosebleeds, sinus pressure, sinus pain, sore throat and  trouble swallowing.    Eyes:  Negative for pain and redness.   Respiratory:  Positive for shortness of breath. Negative for apnea, cough, chest tightness and wheezing.    Cardiovascular:  Positive for chest pain and leg swelling. Negative for palpitations.   Gastrointestinal:  Negative for abdominal distention, abdominal pain, anal bleeding, blood in stool, constipation, diarrhea, nausea and vomiting.   Endocrine: Negative for polyphagia and polyuria.   Genitourinary:  Negative for decreased urine volume, difficulty urinating, dysuria, frequency, hematuria and urgency.   Musculoskeletal:  Negative for back pain, gait problem, joint swelling, myalgias, neck pain and neck stiffness.   Skin:  Negative for color change, pallor, rash and wound.   Neurological:  Positive for weakness. Negative for dizziness, seizures, syncope, facial asymmetry, speech difficulty, light-headedness, numbness and headaches.   Psychiatric/Behavioral:  Negative for agitation, behavioral problems, confusion, hallucinations, sleep disturbance and suicidal ideas.    Objective:     Vital Signs (Most Recent):  Temp: 98 °F (36.7 °C) (06/05/23 1959)  Pulse: 99 (06/05/23 2100)  Resp: 19 (06/05/23 2100)  BP: 123/68 (06/05/23 2100)  SpO2: (!) 94 % (06/05/23 2100) Vital Signs (24h Range):  Temp:  [97.4 °F (36.3 °C)-98 °F (36.7 °C)] 98 °F (36.7 °C)  Pulse:  [] 99  Resp:  [18-22] 19  SpO2:  [94 %-100 %] 94 %  BP: (103-134)/(62-76) 123/68     Weight: 75.4 kg (166 lb 3.6 oz)  Body mass index is 26.83 kg/m².     Physical Exam  Vitals reviewed.   Constitutional:       General: She is not in acute distress.     Appearance: Normal appearance. She is not ill-appearing, toxic-appearing or diaphoretic.   HENT:      Head: Normocephalic.      Right Ear: External ear normal.      Left Ear: External ear normal.      Nose: No congestion or rhinorrhea.      Mouth/Throat:      Mouth: Mucous membranes are moist.      Pharynx: Oropharynx is clear. No oropharyngeal  exudate or posterior oropharyngeal erythema.   Eyes:      Extraocular Movements: Extraocular movements intact.      Conjunctiva/sclera: Conjunctivae normal.      Pupils: Pupils are equal, round, and reactive to light.   Cardiovascular:      Rate and Rhythm: Normal rate and regular rhythm.      Pulses: Normal pulses.      Heart sounds: Normal heart sounds.   Pulmonary:      Effort: Pulmonary effort is normal.      Breath sounds: Rales present.   Abdominal:      General: Abdomen is flat. Bowel sounds are normal.      Palpations: Abdomen is soft.   Genitourinary:     Rectum: Normal.   Musculoskeletal:         General: Normal range of motion.      Cervical back: Normal range of motion and neck supple.      Right lower leg: Edema present.      Left lower leg: Edema present.   Skin:     General: Skin is warm and dry.      Capillary Refill: Capillary refill takes less than 2 seconds.   Neurological:      Mental Status: She is alert and oriented to person, place, and time.   Psychiatric:         Mood and Affect: Mood normal.            CRANIAL NERVES     CN III, IV, VI   Pupils are equal, round, and reactive to light.     Significant Labs: All pertinent labs within the past 24 hours have been reviewed.  Recent Lab Results         06/05/23  1904   06/05/23  1557        Albumin   3.1       Alkaline Phosphatase   79       ALT   18       Anion Gap   6       AST   22       Baso #   0.05       Basophil %   0.5       BILIRUBIN TOTAL   1.6  Comment: For infants and newborns, interpretation of results should be based  on gestational age, weight and in agreement with clinical  observations.    Premature Infant recommended reference ranges:  Up to 24 hours.............<8.0 mg/dL  Up to 48 hours............<12.0 mg/dL  3-5 days..................<15.0 mg/dL  6-29 days.................<15.0 mg/dL         BNP   1,294  Comment: Values of less than 100 pg/ml are consistent with non-CHF populations.       BUN   49       Calcium   8.5        Chloride   110       CO2   22       Creatinine   1.4       Differential Method   Automated       eGFR   35.7       Eos #   0.1       Eosinophil %   0.9       Glucose   77       Gran # (ANC)   7.6       Gran %   75.1       Hematocrit   31.4       Hemoglobin   9.9       Immature Grans (Abs)   0.06  Comment: Mild elevation in immature granulocytes is non specific and   can be seen in a variety of conditions including stress response,   acute inflammation, trauma and pregnancy. Correlation with other   laboratory and clinical findings is essential.         Immature Granulocytes   0.6       Lymph #   1.5       Lymph %   14.8       Magnesium   1.7       MCH   29.5       MCHC   31.5       MCV   94       Mono #   0.8       Mono %   8.1       MPV   10.3       nRBC   0       Platelets   338       Potassium   4.5       PROTEIN TOTAL   6.8       RBC   3.36       RDW   15.9       Sodium   138       Troponin I High Sensitivity 230.3  Comment: Troponin results differ between methods. Do not use   results between Troponin methods interchangeably.    Alkaline Phospatase levels above 400 U/L may   cause false positive results.    Access hsTnI should not be used for patients taking   Asfotase ekaterina (Strensiq).  Troponiin critical result(s) called and verbal readback obtained   from Marquis Arevalo RN Cardio A  by MS1 06/05/2023 20:12     238.2  Comment: Troponin results differ between methods. Do not use   results between Troponin methods interchangeably.    Alkaline Phospatase levels above 400 U/L may   cause false positive results.    Access hsTnI should not be used for patients taking   Asfotase ekaterina (Strensiq).  Trop critical result(s) repeated. Called and verbal readback obtained   from Jeanette aponte RN/ed by [facility] 06/05/2023 17:01         WBC   10.07               Significant Imaging: I have reviewed all pertinent imaging results/findings within the past 24 hours.    Assessment/Plan:     * New onset of congestive heart  failure  Patient is identified as having New onset heart failure that is Acute. CHF is currently uncontrolled due to Continued edema of extremities, Dyspnea not returned to baseline after 1 doses of IV diuretic and Rales/crackles on pulmonary exam. Latest ECHO performed and demonstrates- Results for orders placed during the hospital encounter of 12/13/22    Echo Saline Bubble? No    Interpretation Summary  · The estimated ejection fraction is 55%.  · The left ventricle is normal in size with normal systolic function.  · Severe left atrial enlargement.  · Indeterminate left ventricular diastolic function.  · Mild right ventricular enlargement.  · Severe right atrial enlargement.  · Mild tricuspid regurgitation.  · Mild mitral regurgitation.  · Intermediate central venous pressure (8 mmHg).  · The estimated PA systolic pressure is 36 mmHg.  .Continue Furosemide and monitor clinical status closely. Monitor on telemetry. Patient is on CHF pathway.  Monitor strict Is&Os and daily weights.  Place on fluid restriction of 1.5 L   . Continue to stress to patient importance of self efficacy and  on diet for CHF. Last BNP reviewed- and noted below   Recent Labs   Lab 06/05/23  1557   BNP 1,294*     Concern for new onset CHF  Echo, CT of chest  Nitro paste for chest pressure  Fluid restriction 1.5 L  Initial troponin 238 will trend x2  IV Lasix 40 mg initiated in ED will continue IV 40 Lasix daily until evaluated by Cardiology  Consult cardiology patient is known to  have her daughter works with Dr. Gonzalez who will see patient in a.m.  Strict I&Os, daily weights    Chest x-ray impression 06/05/2023  Stable cardiomegaly, with bilateral lower lung atelectasis and or airspace disease, and bilateral pleural effusions    Wounds, multiple  skin tears, chronic wounds  Consult wound care  Multiple wounds bilateral upper and lower extremities      Pulmonary edema  Chest x-ray shows bilateral pleural effusion  Follow  echo  Consider CT of chest  Continue IV Lasix  Repeat two view chest x-ray a.m.      Persistent atrial fibrillation  Patient with Paroxysmal (<7 days) atrial fibrillation which is controlled currently with Beta Blocker. Patient is currently in atrial fibrillation.[id number] Score: 5. HASBLED Score: 4. Anticoagulation indicated. Anticoagulation done with Eliquis.    Continue Eliquis b.i.d.  EKG shows AFib controlled  Telemetry monitoring  Managed outpatient with Cardiology    Hyperlipemia  Lipid panel  Continue home medication Lipitor 40 mg      Hypertension  Stable/chronic  Continue home medication metoprolol, Benicar  Monitor vital signs        Type 2 diabetes mellitus without complication, without long-term current use of insulin  Patient's FSGs are controlled on current medication regimen.  Last A1c reviewed-   Lab Results   Component Value Date    HGBA1C 5.8 (H) 12/13/2022     Most recent fingerstick glucose reviewed- No results for input(s): POCTGLUCOSE in the last 24 hours.  Current correctional scale  Low  Maintain anti-hyperglycemic dose as follows-   Antihyperglycemics (From admission, onward)      None          POCT/sliding scale   Hold Oral hypoglycemics while patient is in the hospital.  A1c      VTE Risk Mitigation (From admission, onward)           Ordered     apixaban tablet 5 mg  2 times daily         06/05/23 2132     IP VTE HIGH RISK PATIENT  Once         06/05/23 1834     Place sequential compression device  Until discontinued         06/05/23 1834                               LANDY Bellamy  Department of Hospital Medicine  [facility]

## 2023-06-06 NOTE — ASSESSMENT & PLAN NOTE
Patient is identified as having New onset heart failure that is Acute. CHF is currently uncontrolled due to Continued edema of extremities, Dyspnea not returned to baseline after 1 doses of IV diuretic and Rales/crackles on pulmonary exam. Latest ECHO performed and demonstrates- Results for orders placed during the hospital encounter of 12/13/22    Echo Saline Bubble? No    Interpretation Summary  · The estimated ejection fraction is 55%.  · The left ventricle is normal in size with normal systolic function.  · Severe left atrial enlargement.  · Indeterminate left ventricular diastolic function.  · Mild right ventricular enlargement.  · Severe right atrial enlargement.  · Mild tricuspid regurgitation.  · Mild mitral regurgitation.  · Intermediate central venous pressure (8 mmHg).  · The estimated PA systolic pressure is 36 mmHg.  .Continue Furosemide and monitor clinical status closely. Monitor on telemetry. Patient is on CHF pathway.  Monitor strict Is&Os and daily weights.  Place on fluid restriction of 1.5 L   . Continue to stress to patient importance of self efficacy and  on diet for CHF. Last BNP reviewed- and noted below   Recent Labs   Lab 06/05/23  1557   BNP 1,294*     Concern for new onset CHF  Echo, CT of chest  Nitro paste for chest pressure  Fluid restriction 1.5 L  Initial troponin 238 will trend x2  IV Lasix 40 mg initiated in ED will continue IV 40 Lasix daily until evaluated by Cardiology  Consult cardiology patient is known to  have her daughter works with Dr. Gonzalez who will see patient in a.m.  Strict I&Os, daily weights    Chest x-ray impression 06/05/2023  Stable cardiomegaly, with bilateral lower lung atelectasis and or airspace disease, and bilateral pleural effusions

## 2023-06-06 NOTE — CARE UPDATE
Attempted echo in am , Patient was sitting up eating, Attempted echo at 12:50pm Patient had just started eating lunch. Will retry after lunch.

## 2023-06-06 NOTE — NURSING
[facility]  Wound Care    Patient Name:  Tierra Jennings   MRN:  [id number]  Date: 6/6/2023  Diagnosis: New onset of congestive heart failure    History:     Past Medical History:   Diagnosis Date    Diabetes mellitus, type 2     Heart murmur     History of psychiatric hospitalization     Hypertension     Paroxysmal atrial fibrillation     Stroke        Social History     Socioeconomic History    Marital status:     Number of children: 5   Tobacco Use    Smoking status: Never    Smokeless tobacco: Never   Substance and Sexual Activity    Alcohol use: No    Drug use: No    Sexual activity: Not Currently       Precautions:     Allergies as of 06/05/2023 - Reviewed 06/05/2023   Allergen Reaction Noted    Aspirin Other (See Comments) 10/01/2013    Codeine  10/01/2013    Erythromycin Rash 10/01/2013       [facility] Assessment Details/Treatment   06/06/2023  89 yr old female   Sacral stage 1 pressure injury  3x3 closed     Bilat heels stage 1 red nonblanching         Abrasion  left knee     Bilat arms  skin tears and bruising         Moisture yeast splits bilat under breast         Bruising right foot from fall     Dorsal left  foot abrasion     Recommendation:   Bilat heels  sacral  left dorsal  Clean area with chlorhexidine/ns  Pat dry  Apply venelex and cover with mepilex.    Bilat legs and arms   Clean with chlorhexidine/ns. Pat dry. Apply Hydrophor daily.  Not between the toes.  Turn reposition q2 as pt's condition will allow.  Bilat heel pillows   Float and elevate heels of bed with pillows.  air mattress

## 2023-06-06 NOTE — ASSESSMENT & PLAN NOTE
Patient with Paroxysmal (<7 days) atrial fibrillation which is controlled currently with Beta Blocker. Patient is currently in atrial fibrillation.[id number] Score: 5. HASBLED Score: 4. Anticoagulation indicated. Anticoagulation done with Eliquis.    Continue Eliquis b.i.d.  EKG shows AFib controlled  Telemetry monitoring  Managed outpatient with Cardiology

## 2023-06-06 NOTE — ED NOTES
Multiple contusions x 4 extremities in various stages of healing with bruising. Bilateral knee contusions with bruising, reports fall in shower several days ago. Painful rash under left breast.  Patient reports pressure ulcer on sacrum, reported to admitting MD.

## 2023-06-06 NOTE — SUBJECTIVE & OBJECTIVE
Past Medical History:   Diagnosis Date    Diabetes mellitus, type 2     Heart murmur     History of psychiatric hospitalization     Hypertension     Paroxysmal atrial fibrillation     Stroke        Past Surgical History:   Procedure Laterality Date    EYE SURGERY      TONSILLECTOMY      VEIN LIGATION AND STRIPPING         Review of patient's allergies indicates:   Allergen Reactions    Aspirin Other (See Comments)     Bleeding    Codeine     Erythromycin Rash       No current facility-administered medications on file prior to encounter.     Current Outpatient Medications on File Prior to Encounter   Medication Sig    apixaban (ELIQUIS) 5 mg Tab Take 1 tablet (5 mg total) by mouth 2 (two) times daily. (Patient taking differently: Take 5 mg by mouth 2 (two) times daily. 0800  1800)    atorvastatin (LIPITOR) 40 MG tablet Take 1 tablet (40 mg total) by mouth once daily. (Patient taking differently: Take 40 mg by mouth every evening.)    flecainide (TAMBOCOR) 50 MG Tab Take 1 tablet (50 mg total) by mouth every 12 (twelve) hours. (Patient taking differently: Take 50 mg by mouth every 12 (twelve) hours. 0800  1800)    furosemide (LASIX) 40 MG tablet Take 1 tablet (40 mg total) by mouth once daily. Hold and restart Monday 01/09/23    glipiZIDE (GLUCOTROL) 2.5 MG TR24 Take 1 tablet (2.5 mg total) by mouth daily with breakfast.    melatonin 10 mg Tab Take 10 mg by mouth nightly as needed.    metFORMIN (GLUCOPHAGE-XR) 500 MG ER 24hr tablet Take 1 tablet (500 mg total) by mouth daily with breakfast.    metoprolol succinate (TOPROL-XL) 25 MG 24 hr tablet Take 0.5 tablets (12.5 mg total) by mouth once daily.    olmesartan (BENICAR) 40 MG tablet Take 1 tablet (40 mg total) by mouth once daily. (Patient taking differently: Take 40 mg by mouth every evening.)    potassium chloride SA (K-DUR,KLOR-CON M) 10 MEQ tablet Take 1 tablet (10 mEq total) by mouth once daily.    blood sugar diagnostic (ONETOUCH ULTRA TEST) Strp 1 strip by  miscellaneous route 2 times daily. DX code 250.00 (Patient taking differently: 1 strip by Misc.(Non-Drug; Combo Route) route 2 (two) times daily. 1 strip by miscellaneous route 2 times daily. DX code 250.00)    blood sugar diagnostic Strp To check BG 2 times daily, to use with insurance preferred meter    [DISCONTINUED] clindamycin (CLEOCIN) 300 MG capsule Take 1 capsule (300 mg total) by mouth 3 (three) times daily.    [DISCONTINUED] clotrimazole (LOTRIMIN) 1 % cream Apply 1 ampule topically 3 (three) times daily.    [DISCONTINUED] hydrocortisone 2.5 % cream Apply topically 2 (two) times daily.    [DISCONTINUED] melatonin (MELATIN) 5 mg Take 1 tablet by mouth every evening.    [DISCONTINUED] mupirocin (BACTROBAN) 2 % ointment Apply topically 3 (three) times daily. (Patient taking differently: Apply 1 g topically 3 (three) times daily.)    [DISCONTINUED] zinc oxide 16 % Oint ointment Apply 1 ampule topically once daily.     Family History       Problem Relation (Age of Onset)    Heart disease Mother          Tobacco Use    Smoking status: Never    Smokeless tobacco: Never   Substance and Sexual Activity    Alcohol use: No    Drug use: No    Sexual activity: Not Currently     Review of Systems   Constitutional:  Negative for activity change, appetite change, chills, diaphoresis, fatigue, fever and unexpected weight change.   HENT:  Negative for congestion, dental problem, facial swelling, nosebleeds, sinus pressure, sinus pain, sore throat and trouble swallowing.    Eyes:  Negative for pain and redness.   Respiratory:  Positive for shortness of breath. Negative for apnea, cough, chest tightness and wheezing.    Cardiovascular:  Positive for chest pain and leg swelling. Negative for palpitations.   Gastrointestinal:  Negative for abdominal distention, abdominal pain, anal bleeding, blood in stool, constipation, diarrhea, nausea and vomiting.   Endocrine: Negative for polyphagia and polyuria.   Genitourinary:  Negative  for decreased urine volume, difficulty urinating, dysuria, frequency, hematuria and urgency.   Musculoskeletal:  Negative for back pain, gait problem, joint swelling, myalgias, neck pain and neck stiffness.   Skin:  Negative for color change, pallor, rash and wound.   Neurological:  Positive for weakness. Negative for dizziness, seizures, syncope, facial asymmetry, speech difficulty, light-headedness, numbness and headaches.   Psychiatric/Behavioral:  Negative for agitation, behavioral problems, confusion, hallucinations, sleep disturbance and suicidal ideas.    Objective:     Vital Signs (Most Recent):  Temp: 98 °F (36.7 °C) (06/05/23 1959)  Pulse: 99 (06/05/23 2100)  Resp: 19 (06/05/23 2100)  BP: 123/68 (06/05/23 2100)  SpO2: (!) 94 % (06/05/23 2100) Vital Signs (24h Range):  Temp:  [97.4 °F (36.3 °C)-98 °F (36.7 °C)] 98 °F (36.7 °C)  Pulse:  [] 99  Resp:  [18-22] 19  SpO2:  [94 %-100 %] 94 %  BP: (103-134)/(62-76) 123/68     Weight: 75.4 kg (166 lb 3.6 oz)  Body mass index is 26.83 kg/m².     Physical Exam  Vitals reviewed.   Constitutional:       General: She is not in acute distress.     Appearance: Normal appearance. She is not ill-appearing, toxic-appearing or diaphoretic.   HENT:      Head: Normocephalic.      Right Ear: External ear normal.      Left Ear: External ear normal.      Nose: No congestion or rhinorrhea.      Mouth/Throat:      Mouth: Mucous membranes are moist.      Pharynx: Oropharynx is clear. No oropharyngeal exudate or posterior oropharyngeal erythema.   Eyes:      Extraocular Movements: Extraocular movements intact.      Conjunctiva/sclera: Conjunctivae normal.      Pupils: Pupils are equal, round, and reactive to light.   Cardiovascular:      Rate and Rhythm: Normal rate and regular rhythm.      Pulses: Normal pulses.      Heart sounds: Normal heart sounds.   Pulmonary:      Effort: Pulmonary effort is normal.      Breath sounds: Rales present.   Abdominal:      General: Abdomen is  flat. Bowel sounds are normal.      Palpations: Abdomen is soft.   Genitourinary:     Rectum: Normal.   Musculoskeletal:         General: Normal range of motion.      Cervical back: Normal range of motion and neck supple.      Right lower leg: Edema present.      Left lower leg: Edema present.   Skin:     General: Skin is warm and dry.      Capillary Refill: Capillary refill takes less than 2 seconds.   Neurological:      Mental Status: She is alert and oriented to person, place, and time.   Psychiatric:         Mood and Affect: Mood normal.            CRANIAL NERVES     CN III, IV, VI   Pupils are equal, round, and reactive to light.     Significant Labs: All pertinent labs within the past 24 hours have been reviewed.  Recent Lab Results         06/05/23  1904   06/05/23  1557        Albumin   3.1       Alkaline Phosphatase   79       ALT   18       Anion Gap   6       AST   22       Baso #   0.05       Basophil %   0.5       BILIRUBIN TOTAL   1.6  Comment: For infants and newborns, interpretation of results should be based  on gestational age, weight and in agreement with clinical  observations.    Premature Infant recommended reference ranges:  Up to 24 hours.............<8.0 mg/dL  Up to 48 hours............<12.0 mg/dL  3-5 days..................<15.0 mg/dL  6-29 days.................<15.0 mg/dL         BNP   1,294  Comment: Values of less than 100 pg/ml are consistent with non-CHF populations.       BUN   49       Calcium   8.5       Chloride   110       CO2   22       Creatinine   1.4       Differential Method   Automated       eGFR   35.7       Eos #   0.1       Eosinophil %   0.9       Glucose   77       Gran # (ANC)   7.6       Gran %   75.1       Hematocrit   31.4       Hemoglobin   9.9       Immature Grans (Abs)   0.06  Comment: Mild elevation in immature granulocytes is non specific and   can be seen in a variety of conditions including stress response,   acute inflammation, trauma and pregnancy.  Correlation with other   laboratory and clinical findings is essential.         Immature Granulocytes   0.6       Lymph #   1.5       Lymph %   14.8       Magnesium   1.7       MCH   29.5       MCHC   31.5       MCV   94       Mono #   0.8       Mono %   8.1       MPV   10.3       nRBC   0       Platelets   338       Potassium   4.5       PROTEIN TOTAL   6.8       RBC   3.36       RDW   15.9       Sodium   138       Troponin I High Sensitivity 230.3  Comment: Troponin results differ between methods. Do not use   results between Troponin methods interchangeably.    Alkaline Phospatase levels above 400 U/L may   cause false positive results.    Access hsTnI should not be used for patients taking   Asfotase ekaterina (Strensiq).  Troponiin critical result(s) called and verbal readback obtained   from Marquis Arevalo RN Cardio A  by MS1 06/05/2023 20:12     238.2  Comment: Troponin results differ between methods. Do not use   results between Troponin methods interchangeably.    Alkaline Phospatase levels above 400 U/L may   cause false positive results.    Access hsTnI should not be used for patients taking   Asfotase ekaterina (Strensiq).  Trop critical result(s) repeated. Called and verbal readback obtained   from Jeanette aponte RN/ed by S 06/05/2023 17:01         WBC   10.07               Significant Imaging: I have reviewed all pertinent imaging results/findings within the past 24 hours.

## 2023-06-07 PROBLEM — Z75.8 DISCHARGE PLANNING ISSUES: Status: ACTIVE | Noted: 2023-01-01

## 2023-06-07 PROBLEM — Z71.89 GOALS OF CARE, COUNSELING/DISCUSSION: Status: ACTIVE | Noted: 2023-01-01

## 2023-06-07 NOTE — PROGRESS NOTES
"[facility]  Department of Cardiology  Progress Note      PATIENT NAME: Tierra Jennings  MRN: [id number]  TODAY'S DATE: 06/07/2023  ADMIT DATE: 6/5/2023                          CONSULT REQUESTED BY: Etienne Avila MD    SUBJECTIVE     PRINCIPAL PROBLEM: Acute on chronic combined systolic and diastolic heart failure      REASON FOR CONSULT:    INTERVAL HISTORY  6/7/23  Family present at  with Dr. Gallego for palliative care consult    Chest pain    HPI:  Hospitalist HPI attached below:  "Patient is a 89-year-old female lives at a facility and a DNR with history of AFib on Eliquis b.i.d., hypertension, diabetes type 2, chronic bilateral lower edema, vascular dementia, stroke.  She states the ED with complaints of chest pain, shortness a breath and weakness started today.  She was evaluated in ED with troponin of 238.2, BNP >1200, hemoglobin 9.9, hematocrit 31.4, creatinine 1.4.  Chest x-ray positive for bilateral pleural effusions, IV for 40 of Lasix ordered.     On assessment patient is tachypneic, on 2 L oxygen to keep sats > 90, reports nonradiating chest pressure midsternal which started last night.  She states she woke up today with increased shortness of breath, tachypnea was placed on oxygen at facility.  Her daughter is at bedside who works with Dr. Gonzalez who states he will see patient in a.m. She reports normally she is followed by cardiologist Dr. Diaz.  According to daughter patient does have a history of congestive heart failure.  She denies lightheadedness, dizziness, nausea, vomiting, fever, chills.  She denies smoking, illicit drugs, alcohol."       Review of patient's allergies indicates:   Allergen Reactions    Aspirin Other (See Comments)     Bleeding    Codeine     Erythromycin Rash       Past Medical History:   Diagnosis Date    Diabetes mellitus, type 2     Heart murmur     History of psychiatric hospitalization     Hypertension     Paroxysmal atrial fibrillation     Stroke  "     Past Surgical History:   Procedure Laterality Date    EYE SURGERY      TONSILLECTOMY      VEIN LIGATION AND STRIPPING       Social History     Tobacco Use    Smoking status: Never    Smokeless tobacco: Never   Substance Use Topics    Alcohol use: No    Drug use: No        REVIEW OF SYSTEMS  Per HPI    OBJECTIVE     VITAL SIGNS (Most Recent)  Temp: 97.9 °F (36.6 °C) (06/07/23 1100)  Pulse: 99 (06/07/23 1500)  Resp: (!) 31 (06/07/23 1500)  BP: 126/61 (06/07/23 1500)  SpO2: (!) 94 % (06/07/23 1500)    VENTILATION STATUS  Resp: (!) 31 (06/07/23 1500)  SpO2: (!) 94 % (06/07/23 1500)           I & O (Last 24H):  Intake/Output Summary (Last 24 hours) at 6/7/2023 1536  Last data filed at 6/7/2023 0848  Gross per 24 hour   Intake 370 ml   Output 1150 ml   Net -780 ml         WEIGHTS  Wt Readings from Last 3 Encounters:   06/05/23 1959 75.4 kg (166 lb 3.6 oz)   06/05/23 1900 75.4 kg (166 lb 3.6 oz)   06/05/23 1506 90.7 kg (200 lb)   06/06/23 0245 75.3 kg (166 lb)   01/07/23 0927 81 kg (178 lb 9.2 oz)       PHYSICAL EXAM  GENERAL: elderly female resting quietly in bed in no apparent distress alert and oriented.       HOME MEDICATIONS:  No current facility-administered medications on file prior to encounter.     Current Outpatient Medications on File Prior to Encounter   Medication Sig Dispense Refill    apixaban (ELIQUIS) 5 mg Tab Take 1 tablet (5 mg total) by mouth 2 (two) times daily. (Patient taking differently: Take 5 mg by mouth 2 (two) times daily. 0800  1800) 60 tablet 5    atorvastatin (LIPITOR) 40 MG tablet Take 1 tablet (40 mg total) by mouth once daily. (Patient taking differently: Take 40 mg by mouth every evening.) 90 tablet 1    flecainide (TAMBOCOR) 50 MG Tab Take 1 tablet (50 mg total) by mouth every 12 (twelve) hours. (Patient taking differently: Take 50 mg by mouth every 12 (twelve) hours. 0800  1800) 60 tablet 5    furosemide (LASIX) 40 MG tablet Take 1 tablet (40 mg total) by mouth once daily. Hold and  restart Monday 01/09/23 90 tablet 1    glipiZIDE (GLUCOTROL) 2.5 MG TR24 Take 1 tablet (2.5 mg total) by mouth daily with breakfast. 90 tablet 1    melatonin 10 mg Tab Take 10 mg by mouth nightly as needed.      metFORMIN (GLUCOPHAGE-XR) 500 MG ER 24hr tablet Take 1 tablet (500 mg total) by mouth daily with breakfast. 90 tablet 1    metoprolol succinate (TOPROL-XL) 25 MG 24 hr tablet Take 0.5 tablets (12.5 mg total) by mouth once daily. 45 tablet 1    olmesartan (BENICAR) 40 MG tablet Take 1 tablet (40 mg total) by mouth once daily. (Patient taking differently: Take 40 mg by mouth every evening.) 90 tablet 1    potassium chloride SA (K-DUR,KLOR-CON M) 10 MEQ tablet Take 1 tablet (10 mEq total) by mouth once daily. 90 tablet 1    blood sugar diagnostic (ONETOUCH ULTRA TEST) Strp 1 strip by miscellaneous route 2 times daily. DX code 250.00 (Patient taking differently: 1 strip by Misc.(Non-Drug; Combo Route) route 2 (two) times daily. 1 strip by miscellaneous route 2 times daily. DX code 250.00) 100 strip 1    blood sugar diagnostic Strp To check BG 2 times daily, to use with insurance preferred meter 100 each 1       SCHEDULED MEDS:   apixaban  5 mg Oral BID    balsam peru-castor oiL   Topical (Top) BID    furosemide (LASIX) injection  40 mg Intravenous Daily    metoprolol succinate  12.5 mg Oral Daily       CONTINUOUS INFUSIONS:    PRN MEDS:acetaminophen, acetaminophen, dextrose 50%, dextrose 50%, glucagon (human recombinant), glucose, glucose, magnesium oxide, magnesium oxide, melatonin, ondansetron, potassium bicarbonate, potassium bicarbonate, potassium bicarbonate, potassium, sodium phosphates, potassium, sodium phosphates, potassium, sodium phosphates, prochlorperazine, sodium chloride 0.9%, white petrolatum    LABS AND DIAGNOSTICS     CBC LAST 3 DAYS  Recent Labs   Lab 06/05/23  1557 06/06/23  0511 06/07/23  0530   WBC 10.07 10.10 9.34   RBC 3.36* 3.34* 3.43*   HGB 9.9* 10.0* 10.1*   HCT 31.4* 31.1* 32.0*   MCV  94 93 93   MCH 29.5 29.9 29.4   MCHC 31.5* 32.2 31.6*   RDW 15.9* 16.0* 15.9*    327 334   MPV 10.3 10.3 10.2   GRAN 75.1*  7.6 66.5  6.7 71.6  6.7   LYMPH 14.8*  1.5 20.0  2.0 14.6*  1.4   MONO 8.1  0.8 10.8  1.1* 10.7  1.0   BASO 0.05 0.06 0.08   NRBC 0 0 0         COAGULATION LAST 3 DAYS  No results for input(s): LABPT, INR, APTT in the last 168 hours.    CHEMISTRY LAST 3 DAYS  Recent Labs   Lab 06/05/23  1557 06/06/23  0511 06/06/23  2226 06/07/23  0530    140 138 134*   K 4.5 4.0 4.1 3.9    108 107 108   CO2 22* 23 24 22*   ANIONGAP 6* 9 7* 4*   BUN 49* 49* 47* 43*   CREATININE 1.4 1.3 1.2 1.1   GLU 77 66* 103 94   CALCIUM 8.5* 8.4* 8.3* 8.1*   MG 1.7  --  1.7 2.1   ALBUMIN 3.1* 2.9*  --  2.9*   PROT 6.8 6.5  --  6.4   ALKPHOS 79 82  --  82   ALT 18 17  --  16   AST 22 21  --  20   BILITOT 1.6* 1.2*  --  1.3*         CARDIAC PROFILE LAST 3 DAYS  Recent Labs   Lab 06/05/23  1557 06/07/23  0530   BNP 1,294* 1,266*         ENDOCRINE LAST 3 DAYS  Recent Labs   Lab 06/06/23  0511   TSH 4.180         LAST ARTERIAL BLOOD GAS  ABG  No results for input(s): PH, PO2, PCO2, HCO3, BE in the last 168 hours.    LAST 7 DAYS MICROBIOLOGY   Microbiology Results (last 7 days)       ** No results found for the last 168 hours. **            MOST RECENT IMAGING  Echo  · Atrial fibrillation observed.  · The left ventricle is normal in size with moderately decreased systolic   function.  · There is moderate left ventricular global hypokinesis.  · The estimated ejection fraction is 35%.  · Moderate right ventricular enlargement with severely reduced right   ventricular systolic function.  · Mild tricuspid regurgitation.  · Mild left atrial enlargement.  · Severe right atrial enlargement.  · Mild-to-moderate mitral regurgitation.  · Elevated central venous pressure (15 mmHg).  · The estimated PA systolic pressure is 44 mmHg.  · There is pulmonary hypertension.     X-Ray Foot Complete Right  Reason: talus  fracture Right talus fracture    FINDINGS:  3 views of right foot show no fracture, dislocation, or destructive osseous lesion. Dorsal right forefoot soft tissue swelling is mild. Arterial vascular calcifications are diffuse. Calcaneal enthesophyte arises near plantar fascia origin. Bones appear diffusely demineralized.    IMPRESSION:  No acute right foot abnormality.    Electronically signed by:  Jose Alejandro Piper MD  6/6/2023 3:12 PM CDT Workstation: [id number]  CT Chest Without Contrast  CT chest without contrast    CLINICAL DATA: Dyspnea    CMS MANDATED QUALITY DATA - CT RADIATION  436    All CT scans at this facility utilize dose modulation, iterative reconstruction, and/or weight based dosing when appropriate to reduce radiation dose to as low as reasonably achievable.    Findings: Thin section axial noncontrast images were obtained from the thoracic inlet to the adrenal glands. Comparison is made to a chest radiograph from June 5, as well as a previous CT study from September 2021.    The heart is moderately enlarged. Very dense multifocal coronary artery atherosclerotic calcification is noted. The thoracic aorta is normal in caliber.    Large bilateral pleural effusions are noted, with associated atelectatic change at both lung bases. Indeterminate pleural-based masslike opacity is noted at the anterior right lung apex measuring 11 mm. Ill-defined density is noted along its margins.    Images of the upper abdomen demonstrate dense multifocal atherosclerotic calcification, involving the aorta, superior mesenteric artery, splenic artery, and hepatic arterial branches.    No acute osseous abnormalities are demonstrated. There is multilevel degenerative disc disease in the thoracic spine.    IMPRESSION:  1. Large bilateral pleural effusions with bilateral atelectasis, left greater than right.  2. Moderate cardiomegaly.  3. Nonspecific pleural-based 11 mm nodular opacity at the anterior right lung apex, with minimal  surrounding airspace disease. This could potentially be infectious/inflammatory in nature, but is indeterminate. This could be further assessed with close interval follow-up in 4-6 weeks to evaluate for possible resolution.  4. Extremely dense multifocal coronary artery atherosclerotic calcification.  5. Additional chronic findings as above.    Electronically signed by:  Dmitri Benson MD  6/6/2023 11:33 AM CDT Workstation: [id number]      ECHOCARDIOGRAM RESULTS (last 5)  Results for orders placed during the hospital encounter of 12/13/22    Echo Saline Bubble? No    Interpretation Summary  · The estimated ejection fraction is 55%.  · The left ventricle is normal in size with normal systolic function.  · Severe left atrial enlargement.  · Indeterminate left ventricular diastolic function.  · Mild right ventricular enlargement.  · Severe right atrial enlargement.  · Mild tricuspid regurgitation.  · Mild mitral regurgitation.  · Intermediate central venous pressure (8 mmHg).  · The estimated PA systolic pressure is 36 mmHg.      CURRENT/PREVIOUS VISIT EKG  Results for orders placed or performed during the hospital encounter of 06/05/23   EKG 12-lead    Collection Time: 06/06/23 12:06 AM    Narrative    Test Reason : R77.8,    Vent. Rate : 085 BPM     Atrial Rate : 044 BPM     P-R Int : 000 ms          QRS Dur : 106 ms      QT Int : 380 ms       P-R-T Axes : 000 -19 159 degrees     QTc Int : 452 ms    Atrial fibrillation  Incomplete left bundle branch block  ST and T wave abnormality, consider lateral ischemia  Abnormal ECG  When compared with ECG of 05-JUN-2023 15:40,  No significant change was found    Referred By: [email or web address]   SELF           Confirmed By:            ASSESSMENT/PLAN:     Active Hospital Problems    Diagnosis    *Acute on chronic combined systolic and diastolic heart failure    Discharge planning issues    Wounds, multiple    Persistent atrial fibrillation    Hyperlipemia    Type 2 diabetes  mellitus without complication, without long-term current use of insulin    Hypertension       ASSESSMENT & PLAN:   Persistent atrial fibrillation  HTN  Acute congestive heart failure  Hyperlipidemia  DM2      RECOMMENDATIONS:  Continue Eliquis 5 mg BID  Start Entresto 24-26 mg BID ; Continue metoprolol 12.5 mg daily and Continue lasix, transition to PO?  3.  Palliative care consulted  4.   Strict I/O, daily weight, low sodium diet  5. Continue to check and replace potassium and magnesium. Goal for potassium is 4.0, and goal for magnesium is 2.0.       Dejah Parsons NP  [facility]  Department of Cardiology  Date of Service: 06/07/2023

## 2023-06-07 NOTE — ASSESSMENT & PLAN NOTE
"I reviewed her chart and discussed her case with her team.      I examined Ms. Jennings at bedside.  She was awake, alert, and interactive, however lacks capacity complex medical decision-making.  I spoke with 3 of her sons and 3 of her daughter-in-law is at bedside.  Her healthcare power of , Isidro, was present as well.      I introduced myself and my role as palliative care physician.  They were agreeable to speaking.      We discussed her medical illness, prognosis, and values in detail.      Briefly, she understands that she is currently admitted and being treated for heart failure that has resulted in respiratory failure.    We discussed her prognosis.  I explained given her age and multiple medical issues I worry her prognosis might be best thought of in terms of weeks to months.  They understood and were not at all surprised by this news.      We discussed her values.  At this point, she simply wants to "breathe better" and gain control of the pain in her heels and left breast.  She nor her family mention any worries or fears.  At this point, they are not sure what more she was willing to go through in order to gain more time on earth.  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      We spoke a bit back and forth.  Since she was obviously improved from admission I recommended to continue supportive care measures optimizing her for discharge.  They understood and agreed.       I did take a moment to speak about hospice and the philosophy of hospice care. I  recommended that they speak with the hospice agency for informational purposes.  They agreed with this recommendation.  They did not have a preference of agency.  Our palliative RN will reach out to [place] and set up a informational meeting with whichever hospice company they contract.      Her family " is asking excellent questions.  I took time to answer questions to the best of my ability.      I am confident that her family will help make wise, patient centered decisions as her condition changes.      I appreciate being involved.  I hope I have been helpful.

## 2023-06-07 NOTE — PT/OT/SLP EVAL
"Occupational Therapy   Evaluation    Name: Tierra Jennings  MRN: [id number]  Admitting Diagnosis: Acute on chronic combined systolic and diastolic heart failure  Recent Surgery: * No surgery found *      Recommendations:     Discharge Recommendations:  (SNF vs [facility] w HHOT pending progress)  Discharge Equipment Recommendations:  other (see comments) (TBD)  Barriers to discharge:  Decreased caregiver support    Assessment:     Tierra Jennings is a 89 y.o. female with a medical diagnosis of Acute on chronic combined systolic and diastolic heart failure. Performance deficits affecting function: weakness, impaired endurance, impaired self care skills, impaired functional mobility, impaired balance, gait instability, decreased coordination, decreased upper extremity function, decreased lower extremity function, decreased safety awareness, pain, decreased ROM, impaired coordination, impaired fine motor, impaired skin, edema, impaired cardiopulmonary response to activity.      Pt began evaluation today. Pt sleeping upon entry to room with food tray in front of her. Woke easily and was pleasant while OT assisted to clean her hands. When explained what OT was, pt became unhappy stating she "would not be getting out of bed today." Agreeable to questions and complete bed level assessment for evaluation.     Rehab Prognosis: Fair; patient would benefit from acute skilled OT services to address these deficits and reach maximum level of function.       Plan:     Patient to be seen 5 x/week to address the above listed problems via self-care/home management, therapeutic activities, therapeutic exercises, wheelchair management/training  Plan of Care Expires: 07/07/23  Plan of Care Reviewed with: patient    Subjective     Chief Complaint: pain in feet and breasts  Patient/Family Comments/goals: to go home    Occupational Profile:  Living Environment: currently resides at [facility] living [facility]   Previous level of " function: reports using w/c for household distances but uses rollator to ambulate to bathroom; assist with bathing, dressing, and IADLs  Roles and Routines: NS  Equipment Used at Home: shower chair, rollator, wheelchair, glucometer  Assistance upon Discharge: from facility    Pain/Comfort:  Pain Rating 1: other (see comments) (not rated)  Location 1: breast  Pain Addressed 1: Distraction  Pain Rating Post-Intervention 1: other (see comments) (not rated)  Pain Rating 2: other (see comments) (not rated)  Location - Side 2: Bilateral  Location 2: foot  Pain Addressed 2: Distraction, Reposition  Pain Rating Post-Intervention 2: other (see comments) (not rate)    Patients cultural, spiritual, Gnosticist conflicts given the current situation: no    Objective:     Communicated with: nurse prior to session.  Patient found HOB elevated with telemetry, blood pressure cuff, PureWick, peripheral IV, pulse ox (continuous), oxygen upon OT entry to room.    General Precautions: Standard, fall  Orthopedic Precautions: N/A  Braces: N/A  Respiratory Status: Nasal cannula, flow 2 L/min    Occupational Performance:    Bed Mobility:  TBD (NT)  Functional Mobility/Transfers: TBD (NT)    Activities of Daily Living:  Feeding:  supervision to eat breakfast    Cognitive/Visual Perceptual:  Cognitive/Psychosocial Skills:     -       Oriented to: Person, Place, Time, and Situation   -       Follows Commands/attention:Follows two-step commands  -       Communication: occasionally unclear  -       Mood/Affect/Coping skills/emotional control: Agitated and Isolative    Physical Exam:  Upper Extremity Range of Motion:     -       Right Upper Extremity: WFL  -       Left Upper Extremity: WFL  Upper Extremity Strength:    -       Right Upper Extremity: Deficits: 3+/5 shoulder  -       Left Upper Extremity: Deficits: 3+/5 shoulder   Strength:    -       Right Upper Extremity: Deficits: poor  -       Left Upper Extremity: Deficits: unable to make  fist    [facility] 6 Click ADL:  AMPAC Total Score: 13    Treatment & Education:  Assessment limited due to refusal to move from bed; will attempt to complete tomorrow (7/8/23).    Patient left HOB elevated with all lines intact, call button in reach, and bed alarm on    GOALS:   Multidisciplinary Problems       Occupational Therapy Goals          Problem: Occupational Therapy    Goal Priority Disciplines Outcome Interventions   Occupational Therapy Goal     OT, PT/OT     Description: Goals to be met by: [id number]     Patient will increase functional independence with ADLs by performing:    Feeding with Supervision.  UE Dressing with Stand-by Assistance.  LE Dressing with Stand-by Assistance.  Grooming while EOB with Stand-by Assistance.  Unsupported sitting EOB for 10 minutes with Stand-by Assistance.                           History:     Past Medical History:   Diagnosis Date    Diabetes mellitus, type 2     Heart murmur     History of psychiatric hospitalization     Hypertension     Paroxysmal atrial fibrillation     Stroke          Past Surgical History:   Procedure Laterality Date    EYE SURGERY      TONSILLECTOMY      VEIN LIGATION AND STRIPPING         Time Tracking:     OT Date of Treatment: 06/07/23  OT Start Time: 1118  OT Stop Time: 1134  OT Total Time (min): 16 min    Billable Minutes:Evaluation 16    6/7/2023

## 2023-06-07 NOTE — SUBJECTIVE & OBJECTIVE
Interval History: See HPI    Past Medical History:   Diagnosis Date    Diabetes mellitus, type 2     Heart murmur     History of psychiatric hospitalization     Hypertension     Paroxysmal atrial fibrillation     Stroke        Past Surgical History:   Procedure Laterality Date    EYE SURGERY      TONSILLECTOMY      VEIN LIGATION AND STRIPPING         Review of patient's allergies indicates:   Allergen Reactions    Aspirin Other (See Comments)     Bleeding    Codeine     Erythromycin Rash       Medications:  Continuous Infusions:  Scheduled Meds:   apixaban  5 mg Oral BID    balsam peru-castor oiL   Topical (Top) BID    furosemide (LASIX) injection  40 mg Intravenous Daily    losartan  12.5 mg Oral Daily    metoprolol succinate  12.5 mg Oral Daily     PRN Meds:acetaminophen, acetaminophen, dextrose 50%, dextrose 50%, glucagon (human recombinant), glucose, glucose, magnesium oxide, magnesium oxide, melatonin, ondansetron, potassium bicarbonate, potassium bicarbonate, potassium bicarbonate, potassium, sodium phosphates, potassium, sodium phosphates, potassium, sodium phosphates, prochlorperazine, sodium chloride 0.9%, white petrolatum    Family History       Problem Relation (Age of Onset)    Heart disease Mother          Tobacco Use    Smoking status: Never    Smokeless tobacco: Never   Substance and Sexual Activity    Alcohol use: No    Drug use: No    Sexual activity: Not Currently       Review of Systems  Objective:     Vital Signs (Most Recent):  Temp: 97.9 °F (36.6 °C) (06/07/23 1100)  Pulse: 99 (06/07/23 1500)  Resp: (!) 31 (06/07/23 1500)  BP: 126/61 (06/07/23 1500)  SpO2: (!) 94 % (06/07/23 1500) Vital Signs (24h Range):  Temp:  [96.6 °F (35.9 °C)-97.9 °F (36.6 °C)] 97.9 °F (36.6 °C)  Pulse:  [] 99  Resp:  [15-31] 31  SpO2:  [94 %-100 %] 94 %  BP: ()/(53-76) 126/61     Weight: 75.4 kg (166 lb 3.6 oz)  Body mass index is 26.83 kg/m².       Physical Exam  Vitals reviewed.   Constitutional:        General: She is not in acute distress.     Appearance: She is ill-appearing.   HENT:      Head: Normocephalic and atraumatic.      Right Ear: External ear normal.      Left Ear: External ear normal.      Nose: Nose normal. No congestion.      Mouth/Throat:      Mouth: Mucous membranes are moist.      Pharynx: No oropharyngeal exudate.   Eyes:      General:         Right eye: No discharge.         Left eye: No discharge.      Extraocular Movements: Extraocular movements intact.   Cardiovascular:      Rate and Rhythm: Tachycardia present.      Pulses: Normal pulses.   Pulmonary:      Effort: Pulmonary effort is normal. No respiratory distress.   Abdominal:      Palpations: Abdomen is soft.      Tenderness: There is no abdominal tenderness.   Skin:     General: Skin is warm.      Findings: Bruising present.   Neurological:      General: No focal deficit present.      Mental Status: She is alert and oriented to person, place, and time.          Review of Symptoms      Symptom Assessment (ESAS 0-10 Scale)  Pain:  5  Dyspnea:  4  Anxiety:  0  Nausea:  0  Depression:  0  Anorexia:  0  Fatigue:  0  Insomnia:  0  Restlessness:  0  Agitation:  0         Pain Assessment:    Location(s): foot    Foot       Location: bilateral        Quality: Aching        Quantity: 7/10 in intensity        Chronicity: Onset 2 week(s) ago, unchanged        Aggravating Factors: None        Alleviating Factors: Acetaminophen        Associated Symptoms: None    Performance Status:  40    Living Arrangements:  Lives in assisted living    Psychosocial/Cultural:   See Palliative Psychosocial Note: No  **Primary  to Follow**  Palliative Care  Consult: No      Advance Care Planning   Advance Directives:   [place]: Yes    Do Not Resuscitate Status: Yes      Decision Making:  Patient answered questions and Family answered questions  Goals of Care: What is most important right now is to focus on avoiding the hospital, symptom/pain  control, quality of life, even if it means sacrificing a little time, improvement in condition but with limits to invasive therapies. Accordingly, we have decided that the best plan to meet the patient's goals includes continuing with current treatment and visiting with a hospice agency for informational purposes.        Significant Labs: BMP:   Recent Labs   Lab 06/07/23  0530   GLU 94   *   K 3.9      CO2 22*   BUN 43*   CREATININE 1.1   CALCIUM 8.1*   MG 2.1     CBC:   Recent Labs   Lab 06/06/23  0511 06/07/23  0530   WBC 10.10 9.34   HGB 10.0* 10.1*   HCT 31.1* 32.0*    334     CBC:   Recent Labs   Lab 06/07/23 0530   WBC 9.34   HGB 10.1*   HCT 32.0*   MCV 93        BMP:  Recent Labs   Lab 06/07/23  0530   GLU 94   *   K 3.9      CO2 22*   BUN 43*   CREATININE 1.1   CALCIUM 8.1*   MG 2.1     LFT:  Lab Results   Component Value Date    AST 20 06/07/2023    ALKPHOS 82 06/07/2023    BILITOT 1.3 (H) 06/07/2023     Albumin:   Albumin   Date Value Ref Range Status   06/07/2023 2.9 (L) 3.5 - 5.2 g/dL Final     Protein:   Total Protein   Date Value Ref Range Status   06/07/2023 6.4 6.0 - 8.4 g/dL Final     Lactic acid:   Lab Results   Component Value Date    LACTATE 1.6 12/13/2022    LACTATE 1.7 12/13/2022       Significant Imaging: I have reviewed all pertinent imaging results/findings within the past 24 hours.

## 2023-06-07 NOTE — HOSPITAL COURSE
06/06  Getting better with diuresis  X Ray R foot was normal     06/07  Pts condition back to baseline  Confused at time  Didn't participated in PT sessions  Palliative care team was consulted as per Daughters wish     06/08  Assumed care. Chart reviewed. Consultant's attendance noted and appreciated. Daughter at bedside requested hospice consult to attend at patient's intermediate residence. Case Manage notified. Patient has no complaints. Is confused and oriented to self only currently. Denied any pain.    06/09  Has been accepted into hospice at long term care facility. Am discharging on regimen listed below. Apixaban dosage decreased because of patient's advance age to 2.5 mg bid. Cardiac/DM diet; activities as tolerated

## 2023-06-07 NOTE — PROGRESS NOTES
[facility] Medicine  Progress Note    Patient Name: Tierra Jennings  MRN: [id number]  Patient Class: IP- Inpatient   Admission Date: 6/5/2023  Length of Stay: 2 days  Attending Physician: Etienne Avila MD  Primary Care Provider: Misael Jasmine MD        Subjective:     Principal Problem:Acute on chronic combined systolic and diastolic heart failure        HPI:  Patient is a 89-year-old female lives at a facility and a DNR with history of AFib on Eliquis b.i.d., hypertension, diabetes type 2, chronic bilateral lower edema, vascular dementia, stroke.  She states the ED with complaints of chest pain, shortness a breath and weakness started today.  She was evaluated in ED with troponin of 238.2, BNP >1200, hemoglobin 9.9, hematocrit 31.4, creatinine 1.4.  Chest x-ray positive for bilateral pleural effusions, IV for 40 of Lasix ordered.    On assessment patient is tachypneic, on 2 L oxygen to keep sats > 90, reports nonradiating chest pressure midsternal which started last night.  She states she woke up today with increased shortness of breath, tachypnea was placed on oxygen at facility.  Her daughter is at bedside who works with Dr. Gonzalez who states he will see patient in a.m. She reports normally she is followed by cardiologist Dr. Diaz.  According to daughter patient does have a history of congestive heart failure.  She denies lightheadedness, dizziness, nausea, vomiting, fever, chills.  She denies smoking, illicit drugs, alcohol.      Hospitalist asked to admit for new onset acute CHF vs pulmonary edema, chest pain, acute hypoxia    IMPRESSION: 6/5/23  Stable cardiomegaly, with bilateral lower lung atelectasis and or airspace disease, and bilateral pleural effusions.     Summary 12/2022     The estimated ejection fraction is 55%.   The left ventricle is normal in size with normal systolic function.   Severe left atrial enlargement.   Indeterminate left ventricular diastolic  function.   Mild right ventricular enlargement.   Severe right atrial enlargement.   Mild tricuspid regurgitation.   Mild mitral regurgitation.   Intermediate central venous pressure (8 mmHg).   The estimated PA systolic pressure is 36 mmHg.           Overview/Hospital Course:  06/06  Getting better with diuresis  X Ray R foot was normal     06/07  Pts condition back to baseline  Confused at time  Didn't participated in PT sessions  Palliative care team was consulted as per Daughters wish       Interval History:         Review of Systems   Unable to perform ROS: Mental status change   Objective:     Vital Signs (Most Recent):  Temp: 97.9 °F (36.6 °C) (06/07/23 1100)  Pulse: 99 (06/07/23 1500)  Resp: (!) 31 (06/07/23 1500)  BP: 126/61 (06/07/23 1500)  SpO2: (!) 94 % (06/07/23 1500) Vital Signs (24h Range):  Temp:  [96.6 °F (35.9 °C)-97.9 °F (36.6 °C)] 97.9 °F (36.6 °C)  Pulse:  [] 99  Resp:  [15-31] 31  SpO2:  [94 %-100 %] 94 %  BP: ()/(53-76) 126/61     Weight: 75.4 kg (166 lb 3.6 oz)  Body mass index is 26.83 kg/m².    Intake/Output Summary (Last 24 hours) at 6/7/2023 1513  Last data filed at 6/7/2023 0848  Gross per 24 hour   Intake 370 ml   Output 1150 ml   Net -780 ml         Physical Exam  Vitals and nursing note reviewed.   Constitutional:       General: She is not in acute distress.     Comments: On and off confused    HENT:      Head: Atraumatic.      Right Ear: External ear normal.      Left Ear: External ear normal.      Nose: Nose normal.      Mouth/Throat:      Mouth: Mucous membranes are moist.   Cardiovascular:      Rate and Rhythm: Normal rate.   Pulmonary:      Effort: Pulmonary effort is normal.   Abdominal:      Palpations: Abdomen is soft.   Musculoskeletal:         General: Normal range of motion.      Cervical back: Normal range of motion.   Skin:     General: Skin is warm.   Neurological:      General: No focal deficit present.           Significant Labs: All pertinent labs  within the past 24 hours have been reviewed.  CBC:   Recent Labs   Lab 06/05/23  1557 06/06/23  0511 06/07/23  0530   WBC 10.07 10.10 9.34   HGB 9.9* 10.0* 10.1*   HCT 31.4* 31.1* 32.0*    327 334     CMP:   Recent Labs   Lab 06/05/23  1557 06/06/23  0511 06/06/23  2226 06/07/23  0530    140 138 134*   K 4.5 4.0 4.1 3.9    108 107 108   CO2 22* 23 24 22*   GLU 77 66* 103 94   BUN 49* 49* 47* 43*   CREATININE 1.4 1.3 1.2 1.1   CALCIUM 8.5* 8.4* 8.3* 8.1*   PROT 6.8 6.5  --  6.4   ALBUMIN 3.1* 2.9*  --  2.9*   BILITOT 1.6* 1.2*  --  1.3*   ALKPHOS 79 82  --  82   AST 22 21  --  20   ALT 18 17  --  16   ANIONGAP 6* 9 7* 4*       Significant Imaging: I have reviewed all pertinent imaging results/findings within the past 24 hours.      Assessment/Plan:      * Acute on chronic combined systolic and diastolic heart failure  ECHO done on December 2022 and June 2023 reviewed   Maintain iv lasix       Discharge planning issues  Now medically stable  Lives in a assisted living facility   Didn't participated in PT sessions today  Palliative care team was consulted as per Daughters wish   Possible discharge tomorrow after palliative care team review, with hospice care ?      Wounds, multiple  Skin tears, chronic wounds  Wound care team on board        Persistent atrial fibrillation  Maintain BBs/NOAC    Hyperlipemia  Lipitor 40 mg daily at home      Hypertension  BP levels on lower range         Type 2 diabetes mellitus without complication, without long-term current use of insulin  Maintain SSI    VTE Risk Mitigation (From admission, onward)         Ordered     apixaban tablet 5 mg  2 times daily         06/05/23 2132     IP VTE HIGH RISK PATIENT  Once         06/05/23 1834     Place sequential compression device  Until discontinued         06/05/23 1834                Discharge Planning   JORGE: 6/7/2023     Code Status: DNR   Is the patient medically ready for discharge?:     Reason for patient still in  hospital (select all that apply): Pending disposition  Discharge Plan A: Home Health                  Etienne Avila MD  Department of Hospital Medicine   [facility]

## 2023-06-07 NOTE — PT/OT/SLP PROGRESS
"Physical Therapy      Patient Name:  Tierra Jennings   MRN:  [id number]    Patient not seen today secondary to Patient unwilling to participate, Other (Comment) (pt states "I'm just not getting up today you can try again tomorrow"). Will follow-up 6/8/23.    "

## 2023-06-07 NOTE — ASSESSMENT & PLAN NOTE
Now medically stable  Lives in a assisted living facility   Didn't participated in PT sessions today  Palliative care team was consulted as per Daughters wish   Possible discharge tomorrow after palliative care team review, with hospice care ?

## 2023-06-07 NOTE — CARE UPDATE
06/06/23 2047   Patient Assessment/Suction   Level of Consciousness (AVPU) alert   Respiratory Effort Normal;Unlabored   Expansion/Accessory Muscles/Retractions no use of accessory muscles   PRE-TX-O2   Device (Oxygen Therapy) room air   SpO2 100 %   Pulse Oximetry Type Continuous   $ Pulse Oximetry - Multiple Charge Pulse Oximetry - Multiple   Pulse 101   Resp 19   Respiratory Evaluation   $ Care Plan Tech Time 15 min

## 2023-06-07 NOTE — SUBJECTIVE & OBJECTIVE
Interval History:         Review of Systems   Unable to perform ROS: Mental status change   Objective:     Vital Signs (Most Recent):  Temp: 97.9 °F (36.6 °C) (06/07/23 1100)  Pulse: 99 (06/07/23 1500)  Resp: (!) 31 (06/07/23 1500)  BP: 126/61 (06/07/23 1500)  SpO2: (!) 94 % (06/07/23 1500) Vital Signs (24h Range):  Temp:  [96.6 °F (35.9 °C)-97.9 °F (36.6 °C)] 97.9 °F (36.6 °C)  Pulse:  [] 99  Resp:  [15-31] 31  SpO2:  [94 %-100 %] 94 %  BP: ()/(53-76) 126/61     Weight: 75.4 kg (166 lb 3.6 oz)  Body mass index is 26.83 kg/m².    Intake/Output Summary (Last 24 hours) at 6/7/2023 1513  Last data filed at 6/7/2023 0848  Gross per 24 hour   Intake 370 ml   Output 1150 ml   Net -780 ml         Physical Exam  Vitals and nursing note reviewed.   Constitutional:       General: She is not in acute distress.     Comments: On and off confused    HENT:      Head: Atraumatic.      Right Ear: External ear normal.      Left Ear: External ear normal.      Nose: Nose normal.      Mouth/Throat:      Mouth: Mucous membranes are moist.   Cardiovascular:      Rate and Rhythm: Normal rate.   Pulmonary:      Effort: Pulmonary effort is normal.   Abdominal:      Palpations: Abdomen is soft.   Musculoskeletal:         General: Normal range of motion.      Cervical back: Normal range of motion.   Skin:     General: Skin is warm.   Neurological:      General: No focal deficit present.           Significant Labs: All pertinent labs within the past 24 hours have been reviewed.  CBC:   Recent Labs   Lab 06/05/23  1557 06/06/23  0511 06/07/23  0530   WBC 10.07 10.10 9.34   HGB 9.9* 10.0* 10.1*   HCT 31.4* 31.1* 32.0*    327 334     CMP:   Recent Labs   Lab 06/05/23  1557 06/06/23  0511 06/06/23  2226 06/07/23  0530    140 138 134*   K 4.5 4.0 4.1 3.9    108 107 108   CO2 22* 23 24 22*   GLU 77 66* 103 94   BUN 49* 49* 47* 43*   CREATININE 1.4 1.3 1.2 1.1   CALCIUM 8.5* 8.4* 8.3* 8.1*   PROT 6.8 6.5  --  6.4    ALBUMIN 3.1* 2.9*  --  2.9*   BILITOT 1.6* 1.2*  --  1.3*   ALKPHOS 79 82  --  82   AST 22 21  --  20   ALT 18 17  --  16   ANIONGAP 6* 9 7* 4*       Significant Imaging: I have reviewed all pertinent imaging results/findings within the past 24 hours.

## 2023-06-07 NOTE — PROGRESS NOTES
[facility] Medicine  Progress Note    Patient Name: Tierra Jennings  MRN: [id number]  Patient Class: IP- Inpatient   Admission Date: 6/5/2023  Length of Stay: 1 days  Attending Physician: Etienne Avila MD  Primary Care Provider: Misael Jasmine MD        Subjective:     Principal Problem:Acute on chronic combined systolic and diastolic heart failure        HPI:  Patient is a 89-year-old female lives at a facility and a DNR with history of AFib on Eliquis b.i.d., hypertension, diabetes type 2, chronic bilateral lower edema, vascular dementia, stroke.  She states the ED with complaints of chest pain, shortness a breath and weakness started today.  She was evaluated in ED with troponin of 238.2, BNP >1200, hemoglobin 9.9, hematocrit 31.4, creatinine 1.4.  Chest x-ray positive for bilateral pleural effusions, IV for 40 of Lasix ordered.    On assessment patient is tachypneic, on 2 L oxygen to keep sats > 90, reports nonradiating chest pressure midsternal which started last night.  She states she woke up today with increased shortness of breath, tachypnea was placed on oxygen at facility.  Her daughter is at bedside who works with Dr. Gonzalez who states he will see patient in a.m. She reports normally she is followed by cardiologist Dr. Diaz.  According to daughter patient does have a history of congestive heart failure.  She denies lightheadedness, dizziness, nausea, vomiting, fever, chills.  She denies smoking, illicit drugs, alcohol.      Hospitalist asked to admit for new onset acute CHF vs pulmonary edema, chest pain, acute hypoxia    IMPRESSION: 6/5/23  Stable cardiomegaly, with bilateral lower lung atelectasis and or airspace disease, and bilateral pleural effusions.     Summary 12/2022     The estimated ejection fraction is 55%.   The left ventricle is normal in size with normal systolic function.   Severe left atrial enlargement.   Indeterminate left ventricular diastolic  function.   Mild right ventricular enlargement.   Severe right atrial enlargement.   Mild tricuspid regurgitation.   Mild mitral regurgitation.   Intermediate central venous pressure (8 mmHg).   The estimated PA systolic pressure is 36 mmHg.           Overview/Hospital Course:  06/06  Getting better with diuresis  X Ray R foot was normal       Interval History:       Review of Systems   Constitutional:  Negative for activity change and appetite change.   HENT:  Negative for congestion and dental problem.    Eyes:  Negative for discharge and itching.   Respiratory:  Negative for shortness of breath.    Cardiovascular:  Negative for chest pain.   Gastrointestinal:  Negative for abdominal distention and abdominal pain.   Endocrine: Negative for cold intolerance.   Genitourinary:  Negative for difficulty urinating and dysuria.   Musculoskeletal:  Negative for arthralgias and back pain.   Skin:  Negative for color change.   Neurological:  Negative for dizziness and facial asymmetry.   Hematological:  Negative for adenopathy.   Psychiatric/Behavioral:  Negative for agitation and behavioral problems.    Objective:     Vital Signs (Most Recent):  Temp: 96.8 °F (36 °C) (06/06/23 1931)  Pulse: 91 (06/06/23 1901)  Resp: 19 (06/06/23 1901)  BP: (!) 106/57 (06/06/23 1901)  SpO2: 100 % (06/06/23 1901) Vital Signs (24h Range):  Temp:  [96.8 °F (36 °C)-98.6 °F (37 °C)] 96.8 °F (36 °C)  Pulse:  [] 91  Resp:  [18-27] 19  SpO2:  [93 %-100 %] 100 %  BP: ()/(50-76) 106/57     Weight: 75.4 kg (166 lb 3.6 oz)  Body mass index is 26.83 kg/m².    Intake/Output Summary (Last 24 hours) at 6/6/2023 1944  Last data filed at 6/6/2023 1646  Gross per 24 hour   Intake 730 ml   Output 1000 ml   Net -270 ml         Physical Exam  Vitals and nursing note reviewed.   Constitutional:       General: She is not in acute distress.  HENT:      Head: Atraumatic.      Right Ear: External ear normal.      Left Ear: External ear normal.       Nose: Nose normal.      Mouth/Throat:      Mouth: Mucous membranes are moist.   Cardiovascular:      Rate and Rhythm: Normal rate.   Pulmonary:      Effort: Pulmonary effort is normal.   Musculoskeletal:         General: Normal range of motion.      Cervical back: Normal range of motion.   Skin:     General: Skin is warm.   Neurological:      Mental Status: She is alert and oriented to person, place, and time.   Psychiatric:         Behavior: Behavior normal.           Significant Labs: All pertinent labs within the past 24 hours have been reviewed.  CBC:   Recent Labs   Lab 06/05/23  1557 06/06/23  0511   WBC 10.07 10.10   HGB 9.9* 10.0*   HCT 31.4* 31.1*    327     CMP:   Recent Labs   Lab 06/05/23  1557 06/06/23  0511    140   K 4.5 4.0    108   CO2 22* 23   GLU 77 66*   BUN 49* 49*   CREATININE 1.4 1.3   CALCIUM 8.5* 8.4*   PROT 6.8 6.5   ALBUMIN 3.1* 2.9*   BILITOT 1.6* 1.2*   ALKPHOS 79 82   AST 22 21   ALT 18 17   ANIONGAP 6* 9       Significant Imaging: I have reviewed all pertinent imaging results/findings within the past 24 hours.      Assessment/Plan:      * Acute on chronic combined systolic and diastolic heart failure  ECHO done on December 2022 and June 2023 reviewed   Maintain iv lasix       Wounds, multiple  Skin tears, chronic wounds  Wound care team on board        Persistent atrial fibrillation  Maintain BBs/NOAC    Hyperlipemia  Lipitor 40 mg daily at home      Hypertension  BP levels on lower range         Type 2 diabetes mellitus without complication, without long-term current use of insulin  Maintain SSI      VTE Risk Mitigation (From admission, onward)         Ordered     apixaban tablet 5 mg  2 times daily         06/05/23 2132     IP VTE HIGH RISK PATIENT  Once         06/05/23 1834     Place sequential compression device  Until discontinued         06/05/23 1834                Discharge Planning   JORGE: 6/7/2023     Code Status: DNR   Is the patient medically ready for  discharge?:     Reason for patient still in hospital (select all that apply): Treatment  Discharge Plan A: Home Health                  Etienen Avila MD  Department of Hospital Medicine   [facility] hospital

## 2023-06-07 NOTE — SUBJECTIVE & OBJECTIVE
Interval History:       Review of Systems   Constitutional:  Negative for activity change and appetite change.   HENT:  Negative for congestion and dental problem.    Eyes:  Negative for discharge and itching.   Respiratory:  Negative for shortness of breath.    Cardiovascular:  Negative for chest pain.   Gastrointestinal:  Negative for abdominal distention and abdominal pain.   Endocrine: Negative for cold intolerance.   Genitourinary:  Negative for difficulty urinating and dysuria.   Musculoskeletal:  Negative for arthralgias and back pain.   Skin:  Negative for color change.   Neurological:  Negative for dizziness and facial asymmetry.   Hematological:  Negative for adenopathy.   Psychiatric/Behavioral:  Negative for agitation and behavioral problems.    Objective:     Vital Signs (Most Recent):  Temp: 96.8 °F (36 °C) (06/06/23 1931)  Pulse: 91 (06/06/23 1901)  Resp: 19 (06/06/23 1901)  BP: (!) 106/57 (06/06/23 1901)  SpO2: 100 % (06/06/23 1901) Vital Signs (24h Range):  Temp:  [96.8 °F (36 °C)-98.6 °F (37 °C)] 96.8 °F (36 °C)  Pulse:  [] 91  Resp:  [18-27] 19  SpO2:  [93 %-100 %] 100 %  BP: ()/(50-76) 106/57     Weight: 75.4 kg (166 lb 3.6 oz)  Body mass index is 26.83 kg/m².    Intake/Output Summary (Last 24 hours) at 6/6/2023 1944  Last data filed at 6/6/2023 1646  Gross per 24 hour   Intake 730 ml   Output 1000 ml   Net -270 ml         Physical Exam  Vitals and nursing note reviewed.   Constitutional:       General: She is not in acute distress.  HENT:      Head: Atraumatic.      Right Ear: External ear normal.      Left Ear: External ear normal.      Nose: Nose normal.      Mouth/Throat:      Mouth: Mucous membranes are moist.   Cardiovascular:      Rate and Rhythm: Normal rate.   Pulmonary:      Effort: Pulmonary effort is normal.   Musculoskeletal:         General: Normal range of motion.      Cervical back: Normal range of motion.   Skin:     General: Skin is warm.   Neurological:      Mental  Status: She is alert and oriented to person, place, and time.   Psychiatric:         Behavior: Behavior normal.           Significant Labs: All pertinent labs within the past 24 hours have been reviewed.  CBC:   Recent Labs   Lab 06/05/23  1557 06/06/23  0511   WBC 10.07 10.10   HGB 9.9* 10.0*   HCT 31.4* 31.1*    327     CMP:   Recent Labs   Lab 06/05/23  1557 06/06/23  0511    140   K 4.5 4.0    108   CO2 22* 23   GLU 77 66*   BUN 49* 49*   CREATININE 1.4 1.3   CALCIUM 8.5* 8.4*   PROT 6.8 6.5   ALBUMIN 3.1* 2.9*   BILITOT 1.6* 1.2*   ALKPHOS 79 82   AST 22 21   ALT 18 17   ANIONGAP 6* 9       Significant Imaging: I have reviewed all pertinent imaging results/findings within the past 24 hours.

## 2023-06-07 NOTE — CARE UPDATE
06/07/23 0725   PRE-TX-O2   Device (Oxygen Therapy) nasal cannula   $ Is the patient on Low Flow Oxygen? Yes   Flow (L/min) 2   Pulse Oximetry Type Continuous   $ Pulse Oximetry - Multiple Charge Pulse Oximetry - Multiple   Resp 15   Respiratory Evaluation   $ Care Plan Tech Time 15 min

## 2023-06-07 NOTE — PLAN OF CARE
06/06/23 1325   Patient Assessment/Suction   Level of Consciousness (AVPU) alert   Respiratory Effort Normal;Unlabored   Rhythm/Pattern, Respiratory no shortness of breath reported   PRE-TX-O2   Device (Oxygen Therapy) room air   SpO2 99 %   Pulse Oximetry Type Continuous   $ Pulse Oximetry - Multiple Charge Pulse Oximetry - Multiple   Pulse 75   Resp (!) 24   Education   $ Education 15 min   Respiratory Evaluation   $ Care Plan Tech Time 15 min

## 2023-06-07 NOTE — CONSULTS
"[facility]  Palliative Medicine  Consult Note    Patient Name: Tierra Jennings  MRN: [id number]  Admission Date: 6/5/2023  Hospital Length of Stay: 2 days  Code Status: DNR   Attending Provider: Etienne Avila MD  Consulting Provider: Norm Sanchez MD  Primary Care Physician: Misael Jasmine MD  Principal Problem:Acute on chronic combined systolic and diastolic heart failure    Patient information was obtained from patient, relative(s), past medical records and primary team.      Inpatient consult to Palliative Care  Consult performed by: Norm Sanchez MD  Consult ordered by: Etienne Avila MD        Assessment/Plan:     Palliative Care  Goals of care, counseling/discussion  I reviewed her chart and discussed her case with her team.      I examined Ms. Jennings at bedside.  She was awake, alert, and interactive, however lacks capacity complex medical decision-making.  I spoke with 3 of her sons and 3 of her daughter-in-law is at bedside.  Her healthcare power of , Isidro, was present as well.      I introduced myself and my role as palliative care physician.  They were agreeable to speaking.      We discussed her medical illness, prognosis, and values in detail.      Briefly, she understands that she is currently admitted and being treated for heart failure that has resulted in respiratory failure.    We discussed her prognosis.  I explained given her age and multiple medical issues I worry her prognosis might be best thought of in terms of weeks to months.  They understood and were not at all surprised by this news.      We discussed her values.  At this point, she simply wants to "breathe better" and gain control of the pain in her heels and left breast.  She nor her family mention any worries or fears.  At this point, they are not sure what more she was willing to go through in order to gain more time on earth.  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      We spoke a bit back and forth.  Since she was obviously improved from admission I recommended to continue supportive care measures optimizing her for discharge.  They understood and agreed.       I did take a moment to speak about hospice and the philosophy of hospice care. I  recommended that they speak with the hospice agency for informational purposes.  They agreed with this recommendation.  They did not have a preference of agency.  Our palliative RN will reach out to [facility] and set up a informational meeting with whichever hospice company they contract.      Her family is asking excellent questions.  I took time to answer questions to the best of my ability.      I am confident that her family will help make wise, patient centered decisions as her condition changes.      I appreciate being involved.  I hope I have been helpful.            Thank you for your consult. I will follow-up with patient. Please contact us if you have any additional questions.    Subjective:     HPI:   No notes on file    Hospital Course:  No notes on file    Interval History: See HPI    Past Medical History:   Diagnosis Date    Diabetes mellitus, type 2     Heart murmur     History of psychiatric hospitalization     Hypertension     Paroxysmal atrial fibrillation     Stroke        Past Surgical History:   Procedure Laterality Date    EYE SURGERY      TONSILLECTOMY      VEIN LIGATION AND STRIPPING         Review of patient's allergies indicates:   Allergen Reactions    Aspirin Other (See Comments)     Bleeding    Codeine     Erythromycin Rash       Medications:  Continuous Infusions:  Scheduled Meds:   apixaban  5 mg Oral BID    balsam peru-castor oiL   Topical (Top) BID    furosemide (LASIX) injection  40 mg Intravenous Daily    losartan  12.5 mg Oral Daily    metoprolol succinate  12.5 mg Oral Daily      PRN Meds:acetaminophen, acetaminophen, dextrose 50%, dextrose 50%, glucagon (human recombinant), glucose, glucose, magnesium oxide, magnesium oxide, melatonin, ondansetron, potassium bicarbonate, potassium bicarbonate, potassium bicarbonate, potassium, sodium phosphates, potassium, sodium phosphates, potassium, sodium phosphates, prochlorperazine, sodium chloride 0.9%, white petrolatum    Family History       Problem Relation (Age of Onset)    Heart disease Mother          Tobacco Use    Smoking status: Never    Smokeless tobacco: Never   Substance and Sexual Activity    Alcohol use: No    Drug use: No    Sexual activity: Not Currently       Review of Systems  Objective:     Vital Signs (Most Recent):  Temp: 97.9 °F (36.6 °C) (06/07/23 1100)  Pulse: 99 (06/07/23 1500)  Resp: (!) 31 (06/07/23 1500)  BP: 126/61 (06/07/23 1500)  SpO2: (!) 94 % (06/07/23 1500) Vital Signs (24h Range):  Temp:  [96.6 °F (35.9 °C)-97.9 °F (36.6 °C)] 97.9 °F (36.6 °C)  Pulse:  [] 99  Resp:  [15-31] 31  SpO2:  [94 %-100 %] 94 %  BP: ()/(53-76) 126/61     Weight: 75.4 kg (166 lb 3.6 oz)  Body mass index is 26.83 kg/m².       Physical Exam  Vitals reviewed.   Constitutional:       General: She is not in acute distress.     Appearance: She is ill-appearing.   HENT:      Head: Normocephalic and atraumatic.      Right Ear: External ear normal.      Left Ear: External ear normal.      Nose: Nose normal. No congestion.      Mouth/Throat:      Mouth: Mucous membranes are moist.      Pharynx: No oropharyngeal exudate.   Eyes:      General:         Right eye: No discharge.         Left eye: No discharge.      Extraocular Movements: Extraocular movements intact.   Cardiovascular:      Rate and Rhythm: Tachycardia present.      Pulses: Normal pulses.   Pulmonary:      Effort: Pulmonary effort is normal. No respiratory distress.   Abdominal:      Palpations: Abdomen is soft.      Tenderness: There is no abdominal tenderness.    Skin:     General: Skin is warm.      Findings: Bruising present.   Neurological:      General: No focal deficit present.      Mental Status: She is alert and oriented to person, place, and time.          Review of Symptoms      Symptom Assessment (ESAS 0-10 Scale)  Pain:  5  Dyspnea:  4  Anxiety:  0  Nausea:  0  Depression:  0  Anorexia:  0  Fatigue:  0  Insomnia:  0  Restlessness:  0  Agitation:  0         Pain Assessment:    Location(s): foot    Foot       Location: bilateral        Quality: Aching        Quantity: 7/10 in intensity        Chronicity: Onset 2 week(s) ago, unchanged        Aggravating Factors: None        Alleviating Factors: Acetaminophen        Associated Symptoms: None    Performance Status:  40    Living Arrangements:  Lives in assisted living    Psychosocial/Cultural:   See Palliative Psychosocial Note: No  **Primary  to Follow**  Palliative Care  Consult: No      Advance Care Planning   Advance Directives:   [place]: Yes    Do Not Resuscitate Status: Yes      Decision Making:  Patient answered questions and Family answered questions  Goals of Care: What is most important right now is to focus on avoiding the hospital, symptom/pain control, quality of life, even if it means sacrificing a little time, improvement in condition but with limits to invasive therapies. Accordingly, we have decided that the best plan to meet the patient's goals includes continuing with current treatment and visiting with a hospice agency for informational purposes.        Significant Labs: BMP:   Recent Labs   Lab 06/07/23  0530   GLU 94   *   K 3.9      CO2 22*   BUN 43*   CREATININE 1.1   CALCIUM 8.1*   MG 2.1     CBC:   Recent Labs   Lab 06/06/23  0511 06/07/23  0530   WBC 10.10 9.34   HGB 10.0* 10.1*   HCT 31.1* 32.0*    334     CBC:   Recent Labs   Lab 06/07/23  0530   WBC 9.34   HGB 10.1*   HCT 32.0*   MCV 93        BMP:  Recent Labs   Lab 06/07/23  0530    GLU 94   *   K 3.9      CO2 22*   BUN 43*   CREATININE 1.1   CALCIUM 8.1*   MG 2.1     LFT:  Lab Results   Component Value Date    AST 20 06/07/2023    ALKPHOS 82 06/07/2023    BILITOT 1.3 (H) 06/07/2023     Albumin:   Albumin   Date Value Ref Range Status   06/07/2023 2.9 (L) 3.5 - 5.2 g/dL Final     Protein:   Total Protein   Date Value Ref Range Status   06/07/2023 6.4 6.0 - 8.4 g/dL Final     Lactic acid:   Lab Results   Component Value Date    LACTATE 1.6 12/13/2022    LACTATE 1.7 12/13/2022       Significant Imaging: I have reviewed all pertinent imaging results/findings within the past 24 hours.        > 50% of 120 min visit spent in chart review, face to face discussion of goals of care,  symptom assessment, coordination of care and emotional support.    Norm Sanchez MD  Palliative Medicine  [facility]

## 2023-06-08 NOTE — PT/OT/SLP PROGRESS
Physical Therapy      Patient Name:  Tierra Jennings   MRN:  [id number]    Patient not seen today secondary to Patient unwilling to participate. Will follow-up 6/9/23.

## 2023-06-08 NOTE — PLAN OF CARE
Called pt's son, Isidro regarding hospice; he would like informational setup at hospice and asked that SW called daughter in law, Madyson to coordinator meeting     Called Madyson and she would like to use [facility] because pt's PCP is also MD at [facility] and available to meet at hospital today at around 3:30 - 4pm    Sent clinicals to [facility] via [employer] and called [phone], spoke to Liseth, informed of referral sent for INFO visit.  Ladi from [facility] will meet with family at 3:30pm in pt's room.     06/08/23 1133   Post-Acute Status   Post-Acute Authorization Hospice   Hospice Status Referrals Sent

## 2023-06-08 NOTE — PROGRESS NOTES
[facility]  Progress Note    Patient Name: Tierra Jennings  MRN: [id number]  Patient Class: IP- Inpatient   Admission Date: 6/5/2023  Length of Stay: 3 days  Attending Physician: Lenard Skinner MD  Primary Care Provider: Misael Jasmine MD        Subjective:     Principal Problem:Acute on chronic combined systolic and diastolic heart failure        HPI:  Patient is a 89-year-old female lives at a facility and a DNR with history of AFib on Eliquis b.i.d., hypertension, diabetes type 2, chronic bilateral lower edema, vascular dementia, stroke.  She states the ED with complaints of chest pain, shortness a breath and weakness started today.  She was evaluated in ED with troponin of 238.2, BNP >1200, hemoglobin 9.9, hematocrit 31.4, creatinine 1.4.  Chest x-ray positive for bilateral pleural effusions, IV for 40 of Lasix ordered.    On assessment patient is tachypneic, on 2 L oxygen to keep sats > 90, reports nonradiating chest pressure midsternal which started last night.  She states she woke up today with increased shortness of breath, tachypnea was placed on oxygen at facility.  Her daughter is at bedside who works with Dr. Gonzalez who states he will see patient in a.m. She reports normally she is followed by cardiologist Dr. Diaz.  According to daughter patient does have a history of congestive heart failure.  She denies lightheadedness, dizziness, nausea, vomiting, fever, chills.  She denies smoking, illicit drugs, alcohol.      Hospitalist asked to admit for new onset acute CHF vs pulmonary edema, chest pain, acute hypoxia    IMPRESSION: 6/5/23  Stable cardiomegaly, with bilateral lower lung atelectasis and or airspace disease, and bilateral pleural effusions.     Summary 12/2022     The estimated ejection fraction is 55%.   The left ventricle is normal in size with normal systolic function.   Severe left atrial enlargement.   Indeterminate left ventricular diastolic  function.   Mild right ventricular enlargement.   Severe right atrial enlargement.   Mild tricuspid regurgitation.   Mild mitral regurgitation.   Intermediate central venous pressure (8 mmHg).   The estimated PA systolic pressure is 36 mmHg.           Overview/Hospital Course:  06/06  Getting better with diuresis  X Ray R foot was normal     06/07  Pts condition back to baseline  Confused at time  Didn't participated in PT sessions  Palliative care team was consulted as per Daughters wish     06/08  Assumed care. Chart reviewed. Consultant's attendance noted and appreciated. Daughter at bedside requested hospice consult to attend at patient's [facility] residence. Case Manage notified. Patient has no complaints. Is confused and oriented to self only currently. Denied any pain.      Interval History: for placement    Review of Systems   Unable to perform ROS: Dementia   Objective:     Vital Signs (Most Recent):  Temp: 98.3 °F (36.8 °C) (06/08/23 1620)  Pulse: (!) 118 (06/08/23 1620)  Resp: 18 (06/08/23 1620)  BP: 135/86 (06/08/23 1620)  SpO2: 96 % (06/08/23 1620) Vital Signs (24h Range):  Temp:  [97 °F (36.1 °C)-98.3 °F (36.8 °C)] 98.3 °F (36.8 °C)  Pulse:  [] 118  Resp:  [18-20] 18  SpO2:  [93 %-97 %] 96 %  BP: ()/(60-86) 135/86     Weight: 75.4 kg (166 lb 3.6 oz)  Body mass index is 26.83 kg/m².    Intake/Output Summary (Last 24 hours) at 6/8/2023 1804  Last data filed at 6/8/2023 1627  Gross per 24 hour   Intake 750 ml   Output 1450 ml   Net -700 ml         Physical Exam  Vitals and nursing note reviewed.   Constitutional:       Appearance: She is well-developed.   HENT:      Head: Normocephalic and atraumatic.      Right Ear: External ear normal.      Left Ear: External ear normal.      Nose: Nose normal.   Eyes:      Conjunctiva/sclera: Conjunctivae normal.      Pupils: Pupils are equal, round, and reactive to light.   Cardiovascular:      Rate and Rhythm: Normal rate and regular rhythm.       Heart sounds: Normal heart sounds.   Pulmonary:      Effort: Pulmonary effort is normal.      Breath sounds: Normal breath sounds.   Abdominal:      General: Bowel sounds are normal.      Palpations: Abdomen is soft.   Musculoskeletal:         General: Normal range of motion.      Cervical back: Normal range of motion and neck supple.   Skin:     General: Skin is warm and dry.      Capillary Refill: Capillary refill takes less than 2 seconds.   Neurological:      Mental Status: She is alert and oriented to person, place, and time.   Psychiatric:         Behavior: Behavior normal.         Thought Content: Thought content normal.         Judgment: Judgment normal.           Significant Labs: All pertinent labs within the past 24 hours have been reviewed.  BMP:   Recent Labs   Lab 06/07/23  0530 06/08/23  0641   GLU 94 129*   * 137   K 3.9 4.1    107   CO2 22* 23   BUN 43* 36*   CREATININE 1.1 1.1   CALCIUM 8.1* 8.8   MG 2.1  --      CBC:   Recent Labs   Lab 06/07/23  0530 06/08/23  0641   WBC 9.34 10.96   HGB 10.1* 10.9*   HCT 32.0* 33.5*    429       Significant Imaging: I have reviewed all pertinent imaging results/findings within the past 24 hours.      Assessment/Plan:      * Acute on chronic combined systolic and diastolic heart failure  ECHO done on December 2022 and June 2023 reviewed   Maintain iv lasix       Discharge planning issues  Now medically stable  Lives in a assisted living facility   Didn't participated in PT sessions today  Palliative care team was consulted as per Daughters wish   Possible discharge tomorrow after palliative care team review, with hospice care ?      Wounds, multiple  Skin tears, chronic wounds  Wound care team on board        Persistent atrial fibrillation  Maintain BBs/NOAC    Hyperlipemia  Lipitor 40 mg daily at home      Hypertension  BP levels on lower range         Type 2 diabetes mellitus without complication, without long-term current use of  insulin  Maintain SSI      VTE Risk Mitigation (From admission, onward)         Ordered     apixaban tablet 5 mg  2 times daily         06/05/23 2132     IP VTE HIGH RISK PATIENT  Once         06/05/23 1834     Place sequential compression device  Until discontinued         06/05/23 1834                Discharge Planning   JORGE: 6/7/2023     Code Status: DNR   Is the patient medically ready for discharge?:     Reason for patient still in hospital (select all that apply): Patient trending condition, Laboratory test and Treatment  Discharge Plan A: Home Health                  Lenard Skinner MD  Department of Hospital Medicine   Critical access hospital

## 2023-06-08 NOTE — PT/OT/SLP PROGRESS
Occupational Therapy      Patient Name:  Tierra Jennings   MRN:  [id number]    Patient not seen today secondary to Patient unwilling to participate. Will follow-up next service date.    6/8/2023

## 2023-06-08 NOTE — NURSING
Patient transferred to room 1219 via bed, with personal belongings.VSS and NAD noted. Report given to LISANDRO Weinberg.  Daughter Madyson notified of patient transfer to new room, verbalized understanding.

## 2023-06-08 NOTE — PROGRESS NOTES
"SHU received call from Ladi at [facility], met with pt and family and they are not ready to start hospice at this time.  Pt wants to return to  [facility] [place] because she has things to take care of and will f/u with hospice when ready for their services.    Called Madyson and pt and family agreed, no hospice at this time and doesn't want HH, "she not going to cooperate with home health".  Plan dc to [place] tomorrow.      "

## 2023-06-08 NOTE — NURSING
Nurses Note -- 4 Eyes      6/8/2023   6:04 AM      Skin assessed during: Transfer      [] No Altered Skin Integrity Present    []Prevention Measures Documented      [x] Yes- Altered Skin Integrity Present or Discovered   [] LDA Added if Not in Epic (Describe Wound)   [] New Altered Skin Integrity was Present on Admit and Documented in LDA   [x] Wound Image Taken    Wound Care Consulted? Yes    Attending Nurse:  Donna Mcnamara RN     Second RN/Staff Member:  Lenard Johnson LPN

## 2023-06-08 NOTE — SUBJECTIVE & OBJECTIVE
Interval History: for placement    Review of Systems   Unable to perform ROS: Dementia   Objective:     Vital Signs (Most Recent):  Temp: 98.3 °F (36.8 °C) (06/08/23 1620)  Pulse: (!) 118 (06/08/23 1620)  Resp: 18 (06/08/23 1620)  BP: 135/86 (06/08/23 1620)  SpO2: 96 % (06/08/23 1620) Vital Signs (24h Range):  Temp:  [97 °F (36.1 °C)-98.3 °F (36.8 °C)] 98.3 °F (36.8 °C)  Pulse:  [] 118  Resp:  [18-20] 18  SpO2:  [93 %-97 %] 96 %  BP: ()/(60-86) 135/86     Weight: 75.4 kg (166 lb 3.6 oz)  Body mass index is 26.83 kg/m².    Intake/Output Summary (Last 24 hours) at 6/8/2023 1804  Last data filed at 6/8/2023 1627  Gross per 24 hour   Intake 750 ml   Output 1450 ml   Net -700 ml         Physical Exam  Vitals and nursing note reviewed.   Constitutional:       Appearance: She is well-developed.   HENT:      Head: Normocephalic and atraumatic.      Right Ear: External ear normal.      Left Ear: External ear normal.      Nose: Nose normal.   Eyes:      Conjunctiva/sclera: Conjunctivae normal.      Pupils: Pupils are equal, round, and reactive to light.   Cardiovascular:      Rate and Rhythm: Normal rate and regular rhythm.      Heart sounds: Normal heart sounds.   Pulmonary:      Effort: Pulmonary effort is normal.      Breath sounds: Normal breath sounds.   Abdominal:      General: Bowel sounds are normal.      Palpations: Abdomen is soft.   Musculoskeletal:         General: Normal range of motion.      Cervical back: Normal range of motion and neck supple.   Skin:     General: Skin is warm and dry.      Capillary Refill: Capillary refill takes less than 2 seconds.   Neurological:      Mental Status: She is alert and oriented to person, place, and time.   Psychiatric:         Behavior: Behavior normal.         Thought Content: Thought content normal.         Judgment: Judgment normal.           Significant Labs: All pertinent labs within the past 24 hours have been reviewed.  BMP:   Recent Labs   Lab  06/07/23  0530 06/08/23  0641   GLU 94 129*   * 137   K 3.9 4.1    107   CO2 22* 23   BUN 43* 36*   CREATININE 1.1 1.1   CALCIUM 8.1* 8.8   MG 2.1  --      CBC:   Recent Labs   Lab 06/07/23  0530 06/08/23  0641   WBC 9.34 10.96   HGB 10.1* 10.9*   HCT 32.0* 33.5*    429       Significant Imaging: I have reviewed all pertinent imaging results/findings within the past 24 hours.

## 2023-06-09 PROBLEM — Z51.5 COMFORT MEASURES ONLY STATUS: Status: ACTIVE | Noted: 2023-01-01

## 2023-06-09 PROBLEM — Z75.8 DISCHARGE PLANNING ISSUES: Status: RESOLVED | Noted: 2023-01-01 | Resolved: 2023-01-01

## 2023-06-09 PROBLEM — I50.43 ACUTE ON CHRONIC COMBINED SYSTOLIC AND DIASTOLIC HEART FAILURE: Status: RESOLVED | Noted: 2023-01-01 | Resolved: 2023-01-01

## 2023-06-09 NOTE — PLAN OF CARE
CM updated that patient and family have now decided to go with hospice. Ladi from [facility] is on her way here to get consents signed by Isidro (son/POA)  Updated Dr. Skinner  Left message for Herman at [facility]     06/09/23 3239   Post-Acute Status   Post-Acute Authorization Hospice   Hospice Status Set-up Complete/Auth obtained   Discharge Plan   Discharge Plan A Hospice/home

## 2023-06-09 NOTE — DISCHARGE INSTRUCTIONS
Thank you for allowing us at [facility] to care for you and your medical needs.    We wish you the best upon your discharge from our health system.    Please take note to the following information:   *Follow up with your outpatient physicians/clinics for continuity of care.  *If you have any additional questions regarding your medical diagnosis,           treatments, etc. After discharge, please follow up with your primary physician.  *Please seek additional medical help if you feel you are having a life-threatening        complication.  *Be sure to take your medications as ordered.  Additional questions regarding         medications, side effects, adverse reactions, administration instructions,           notify your pharmacy or your primary physician.    Going Home from the Hospital with [employer]wi[employer]    How Do I Access my Discharge Instructions in [employer]wi[employer]?  Go to [email or web address]  Select My Medical Record  Select Hospital Admissions  From here, you can review your hospital After Visit Summary, including your discharge instructions.    How Do I Make a Follow-Up Appointment in [employer]wi[employer]?  Go to [email or web address]  Select Appointments  Select the link Schedule an Appt  Follow the prompts to schedule your appointment    Additional Information  If you have questions, you can email or talk to our [employer]wi[employer] staff. Remember, [employer] is NOT to be used for urgent needs. For medical emergencies, dial 911.

## 2023-06-09 NOTE — DISCHARGE SUMMARY
[facility]  Discharge Summary      Patient Name: Tierra Jennings  MRN: [id number]  UMESH: [id number]  Patient Class: IP- Inpatient  Admission Date: 6/5/2023  Hospital Length of Stay: 4 days  Discharge Date and Time:  06/09/2023 11:46 AM  Attending Physician: Lenard Skinner MD   Discharging Provider: Lenard Skinner MD  Primary Care Provider: Misael Jasmine MD    Primary Care Team: Networked reference to record PCT     HPI:   Patient is a 89-year-old female lives at a facility and a DNR with history of AFib on Eliquis b.i.d., hypertension, diabetes type 2, chronic bilateral lower edema, vascular dementia, stroke.  She states the ED with complaints of chest pain, shortness a breath and weakness started today.  She was evaluated in ED with troponin of 238.2, BNP >1200, hemoglobin 9.9, hematocrit 31.4, creatinine 1.4.  Chest x-ray positive for bilateral pleural effusions, IV for 40 of Lasix ordered.    On assessment patient is tachypneic, on 2 L oxygen to keep sats > 90, reports nonradiating chest pressure midsternal which started last night.  She states she woke up today with increased shortness of breath, tachypnea was placed on oxygen at facility.  Her daughter is at bedside who works with Dr. Gonzalez who states he will see patient in a.m. She reports normally she is followed by cardiologist Dr. Diaz.  According to daughter patient does have a history of congestive heart failure.  She denies lightheadedness, dizziness, nausea, vomiting, fever, chills.  She denies smoking, illicit drugs, alcohol.      Hospitalist asked to admit for new onset acute CHF vs pulmonary edema, chest pain, acute hypoxia    IMPRESSION: 6/5/23  Stable cardiomegaly, with bilateral lower lung atelectasis and or airspace disease, and bilateral pleural effusions.     Summary 12/2022     The estimated ejection fraction is 55%.   The left ventricle is normal in size with normal systolic function.   Severe  left atrial enlargement.   Indeterminate left ventricular diastolic function.   Mild right ventricular enlargement.   Severe right atrial enlargement.   Mild tricuspid regurgitation.   Mild mitral regurgitation.   Intermediate central venous pressure (8 mmHg).   The estimated PA systolic pressure is 36 mmHg.           * No surgery found *      Hospital Course:   06/06  Getting better with diuresis  X Ray R foot was normal     06/07  Pts condition back to baseline  Confused at time  Didn't participated in PT sessions  Palliative care team was consulted as per Daughters wish     06/08  Assumed care. Chart reviewed. Consultant's attendance noted and appreciated. Daughter at bedside requested hospice consult to attend at patient's [facility] residence. Case Manage notified. Patient has no complaints. Is confused and oriented to self only currently. Denied any pain.    06/09  Has been accepted into hospice at long term care facility. Am discharging on regimen listed below. Apixaban dosage decreased because of patient's advance age to 2.5 mg bid. Cardiac/DM diet; activities as tolerated       Goals of Care Treatment Preferences:  Code Status: DNR    Health care agent: Isidro Jennings  Health care agent number: No value filed.    Living Will: Yes  LaPOST: Yes  What is most important right now is to focus on avoiding the hospital, symptom/pain control, quality of life, even if it means sacrificing a little time, improvement in condition but with limits to invasive therapies.    hospice      Consults:   Consults (From admission, onward)        Status Ordering Provider     Inpatient consult to Social Work/Case Management  Once        Provider:  (Not yet assigned)    Ordered MICKEY DE LA TORRE     Inpatient consult to Social Work/Case Management  Once        Provider:  (Not yet assigned)    Completed MICKEY DE LA TORRE     Inpatient consult to Palliative Care  Once        Provider:  Norm Sanchez MD    Completed  RENO GUADARRAMA     Inpatient consult to Registered Dietitian/Nutritionist  Once        Provider:  (Not yet assigned)    Completed LAURA HEMPHILL     IP consult to case management  Once        Provider:  (Not yet assigned)    Completed LAURA HEMPHILL     Inpatient consult to Cardiology  Once        Provider:  Sudha Mahmood MD    Completed JESSICA GRANADO          No new Assessment & Plan notes have been filed under this hospital service since the last note was generated.  Service: Hospital Medicine    Final Active Diagnoses:    Diagnosis Date Noted POA    Comfort measures only status [Z51.5] 06/09/2023 Not Applicable    Goals of care, counseling/discussion [Z71.89] 06/07/2023 Not Applicable    Wounds, multiple [T07.XXXA] 06/06/2023 Yes     Chronic    Persistent atrial fibrillation [I48.19] 11/06/2020 Yes     Chronic    Hyperlipemia [E78.5] 01/19/2015 Yes     Chronic    Type 2 diabetes mellitus without complication, without long-term current use of insulin [E11.9] 10/01/2013 Yes     Chronic    Hypertension [I10] 10/01/2013 Yes     Chronic      Problems Resolved During this Admission:    Diagnosis Date Noted Date Resolved POA    PRINCIPAL PROBLEM:  Acute on chronic combined systolic and diastolic heart failure [I50.43] 06/06/2023 06/09/2023 Yes    Discharge planning issues [Z02.9] 06/07/2023 06/09/2023 Not Applicable       Discharged Condition: poor    Disposition: Hospice/Medical Facility    Follow Up:    Patient Instructions:      Ambulatory referral/consult to Outpatient Case Management   Referral Priority: Routine Referral Type: Consultation   Referral Reason: Specialty Services Required   Number of Visits Requested: 1     Diet Cardiac     Diet diabetic     Activity as tolerated       Significant Diagnostic Studies: N/A    Pending Diagnostic Studies:     None         Medications:  Reconciled Home Medications:      Medication List      CHANGE how you take these medications    apixaban  2.5 mg Tab  Commonly known as: ELIQUIS  Take 1 tablet (2.5 mg total) by mouth 2 (two) times daily.  What changed:   · medication strength  · how much to take        CONTINUE taking these medications    glipiZIDE 2.5 MG Tr24  Commonly known as: GLUCOTROL  Take 1 tablet (2.5 mg total) by mouth daily with breakfast.     melatonin 10 mg Tab  Take 10 mg by mouth nightly as needed.     metFORMIN 500 MG ER 24hr tablet  Commonly known as: GLUCOPHAGE-XR  Take 1 tablet (500 mg total) by mouth daily with breakfast.     metoprolol succinate 25 MG 24 hr tablet  Commonly known as: TOPROL-XL  Take 0.5 tablets (12.5 mg total) by mouth once daily.        STOP taking these medications    atorvastatin 40 MG tablet  Commonly known as: LIPITOR     blood sugar diagnostic Strp     flecainide 50 MG Tab  Commonly known as: TAMBOCOR     furosemide 40 MG tablet  Commonly known as: LASIX     olmesartan 40 MG tablet  Commonly known as: BENICAR     potassium chloride SA 10 MEQ tablet  Commonly known as: K-DUR,KLOR-BHAVANI CARDENAS            Indwelling Lines/Drains at time of discharge:   Lines/Drains/Airways     None                 Time spent on the discharge of patient: 322  minutes         Lenard Skinner MD  Department of Hospital Medicine  [facility]

## 2023-06-09 NOTE — NURSING
Received report from LISANDRO Avila. Assumed care at this time. Patient resting comfortably in bed with eyes closed in no apparent distress. Awaiting ambulance transport to [facility]. Call light within reach. Bed in lowest position and locked.     [facility] ambulance service here to pickup patient to take to [facility]. Notified Ladi, with [facility] and  at [facility].

## 2023-06-09 NOTE — PLAN OF CARE
CM requested follow up apt with PCP from [facility] (Randi Acuna, Crystal Muller, Aliya Mcgee, Meg Ring, Keturah Bustillo, and Shweta Chase). Someone from [facility] will contact pt for scheduling.

## 2023-06-09 NOTE — NURSING
06/09/2023      Assumed care of patient.   Assessment and vital signs assessed.   Labs pending final result and meds reviewed.  2L NC  Dysphasia DM/Cardiac Diet  Patient fidgets frequently, kicking off heel protectors and scooting self sideways in bed. Assisted with repositioning.  Anticipating discharge today back to [facility], patient and family declined hospice services yesterday according to notes.    1313 Patient being discharge to [facility] today and family did decide to go with [facility]. Herman CARPENTER with [facility] at hospital, report given face to face.   Awaiting transport set up for keegan.

## 2023-06-09 NOTE — ASSESSMENT & PLAN NOTE
Reviewed her chart discussed her case with team.    I examined Ms. Jennings at bedside.  She is obtunded and lacks capacity for complex medical decision making.  I spoke with her healthcare power of /Isidro and his wife, Madyson, by phone and at bedside on multiple separate occasions today.    They seem to have a very accurate understanding of her current condition.  It is most important for her to live out her days comfortably with care focus on quality.  They explained that she is not leaving a good quality of life at [facility].  They do not desire further hospitalization and repeat hospitalization in the future.  At this point they desire to discharge back to her assisted living with the care of hospice in place.  I affirmed this plan.    I did take a moment to readdress hospice and the philosophy of hospice care. Their goals are very much aligned with hospice.    I answered many questions.    I appreciate being involved.  I hope I have been helpful.

## 2023-06-09 NOTE — PT/OT/SLP PROGRESS
"Occupational Therapy      Patient Name:  Tierra Jennings   MRN:  [id number]    Attempted OT tx. Patient's family deferred OT tx stating "Don't bother. She's leaving today anyway."    6/9/2023  "

## 2023-06-09 NOTE — SUBJECTIVE & OBJECTIVE
Interval History: See HPI    Past Medical History:   Diagnosis Date    Diabetes mellitus, type 2     Heart murmur     History of psychiatric hospitalization     Hypertension     Paroxysmal atrial fibrillation     Stroke        Past Surgical History:   Procedure Laterality Date    EYE SURGERY      TONSILLECTOMY      VEIN LIGATION AND STRIPPING         Review of patient's allergies indicates:   Allergen Reactions    Aspirin Other (See Comments)     Bleeding    Codeine     Erythromycin Rash       Medications:  Continuous Infusions:  Scheduled Meds:   apixaban  5 mg Oral BID    balsam peru-castor oiL   Topical (Top) BID    furosemide  40 mg Oral Daily    losartan  25 mg Oral Daily    [START ON 6/10/2023] metoprolol succinate  25 mg Oral Daily     PRN Meds:acetaminophen, acetaminophen, dextrose 50%, dextrose 50%, glucagon (human recombinant), glucose, glucose, magnesium oxide, magnesium oxide, melatonin, ondansetron, potassium bicarbonate, potassium bicarbonate, potassium bicarbonate, potassium, sodium phosphates, potassium, sodium phosphates, potassium, sodium phosphates, prochlorperazine, sodium chloride 0.9%, white petrolatum    Family History       Problem Relation (Age of Onset)    Heart disease Mother          Tobacco Use    Smoking status: Never    Smokeless tobacco: Never   Substance and Sexual Activity    Alcohol use: No    Drug use: No    Sexual activity: Not Currently       Review of Systems   Unable to perform ROS: Patient unresponsive   Objective:     Vital Signs (Most Recent):  Temp: 98.2 °F (36.8 °C) (06/09/23 1219)  Pulse: (!) 124 (06/09/23 1219)  Resp: 18 (06/09/23 1219)  BP: 115/70 (06/09/23 1219)  SpO2: 96 % (06/09/23 1219) Vital Signs (24h Range):  Temp:  [97.3 °F (36.3 °C)-98.3 °F (36.8 °C)] 98.2 °F (36.8 °C)  Pulse:  [112-124] 124  Resp:  [18] 18  SpO2:  [95 %-96 %] 96 %  BP: (115-157)/(70-86) 115/70     Weight: 75.4 kg (166 lb 3.6 oz)  Body mass index is 26.83 kg/m².       Physical Exam  Vitals  reviewed.   Constitutional:       General: She is not in acute distress.     Appearance: She is ill-appearing.   HENT:      Head: Normocephalic and atraumatic.      Right Ear: External ear normal.      Left Ear: External ear normal.      Nose: Nose normal. No congestion.      Mouth/Throat:      Mouth: Mucous membranes are moist.      Pharynx: No oropharyngeal exudate.   Eyes:      General:         Right eye: No discharge.         Left eye: No discharge.      Extraocular Movements: Extraocular movements intact.   Cardiovascular:      Rate and Rhythm: Tachycardia present.      Pulses: Normal pulses.   Pulmonary:      Effort: Pulmonary effort is normal. No respiratory distress.   Abdominal:      Palpations: Abdomen is soft.      Tenderness: There is no abdominal tenderness.   Skin:     General: Skin is warm.      Findings: Bruising present.   Neurological:      General: No focal deficit present.      Mental Status: She is alert and oriented to person, place, and time.          Review of Symptoms      Symptom Assessment (ESAS 0-10 Scale)  Unable to complete assessment due to Patient unresponsive         Pain Assessment:    Location(s): foot      Pain Assessment in Advanced Demential Scale (PAINAD)   Breathing - Independent of vocalization:  0  Negative vocalization:  0  Facial expression:  0  Body language:  0  Consolability:  0  Total:  0    Performance Status:  10    Living Arrangements:  Lives in assisted living    Psychosocial/Cultural:   See Palliative Psychosocial Note: No  **Primary  to Follow**  Palliative Care  Consult: No      Advance Care Planning   Advance Directives:   [place]POST: Yes    Do Not Resuscitate Status: Yes      Decision Making:  Family answered questions and Patient unable to communicate due to disease severity/cognitive impairment  Goals of Care: The family endorses that what is most important right now is to focus on avoiding the hospital and comfort and QOL      Accordingly, we have decided that the best plan to meet the patient's goals includes enrolling in hospice care         Significant Labs: BMP:   Recent Labs   Lab 06/08/23 0641   *      K 4.1      CO2 23   BUN 36*   CREATININE 1.1   CALCIUM 8.8       CBC:   Recent Labs   Lab 06/08/23 0641   WBC 10.96   HGB 10.9*   HCT 33.5*          CBC:   Recent Labs   Lab 06/08/23 0641   WBC 10.96   HGB 10.9*   HCT 33.5*   MCV 93          BMP:  No results for input(s): GLU, NA, K, CL, CO2, BUN, CREATININE, CALCIUM, MG in the last 24 hours.    LFT:  Lab Results   Component Value Date    AST 20 06/08/2023    ALKPHOS 90 06/08/2023    BILITOT 1.1 (H) 06/08/2023     Albumin:   Albumin   Date Value Ref Range Status   06/08/2023 3.0 (L) 3.5 - 5.2 g/dL Final     Protein:   Total Protein   Date Value Ref Range Status   06/08/2023 6.9 6.0 - 8.4 g/dL Final     Lactic acid:   Lab Results   Component Value Date    LACTATE 1.6 12/13/2022    LACTATE 1.7 12/13/2022       Significant Imaging: I have reviewed all pertinent imaging results/findings within the past 24 hours.

## 2023-06-09 NOTE — PLAN OF CARE
Spoke with Herman at [facility]- updated him on likely DC today.   States he will need DC orders/AVS faxed to him at [phone] once available and will have to come to hospital to do his own assessment before she can return. Cut of time for patient returning 3:30 PM

## 2023-06-09 NOTE — PROGRESS NOTES
[facility]  Palliative Medicine  Progress Note    Patient Name: Tierra Jennings  MRN: [id number]  Admission Date: 6/5/2023  Hospital Length of Stay: 4 days  Code Status: DNR   Attending Provider: Lenard Skinner MD  Consulting Provider: Norm Sanchez MD  Primary Care Physician: Misael Jasmine MD  Principal Problem:Acute on chronic combined systolic and diastolic heart failure    Patient information was obtained from patient, relative(s), past medical records and primary team.      Assessment/Plan:     Palliative Care  Goals of care, counseling/discussion      Reviewed her chart discussed her case with team.    I examined Ms. Jennings at bedside.  She is obtunded and lacks capacity for complex medical decision making.  I spoke with her healthcare power of /Isidro and his wife, Madyson, by phone and at bedside on multiple separate occasions today.    They seem to have a very accurate understanding of her current condition.  It is most important for her to live out her days comfortably with care focus on quality.  They explained that she is not leaving a good quality of life at [facility].  They do not desire further hospitalization and repeat hospitalization in the future.  At this point they desire to discharge back to her assisted living with the care of hospice in place.  I affirmed this plan.    I did take a moment to readdress hospice and the philosophy of hospice care. Their goals are very much aligned with hospice.    I answered many questions.    I appreciate being involved.  I hope I have been helpful.        I will sign off. Please contact us if you have any additional questions.    Subjective:     Chief Complaint:   Chief Complaint   Patient presents with    Chest Pain     Pt arrived via EMS from [facility] complained generalized weakness x3-4 days and today complained of chest pain.        HPI:   No notes on file    Hospital Course:  No notes on file    Interval  History: See HPI    Past Medical History:   Diagnosis Date    Diabetes mellitus, type 2     Heart murmur     History of psychiatric hospitalization     Hypertension     Paroxysmal atrial fibrillation     Stroke        Past Surgical History:   Procedure Laterality Date    EYE SURGERY      TONSILLECTOMY      VEIN LIGATION AND STRIPPING         Review of patient's allergies indicates:   Allergen Reactions    Aspirin Other (See Comments)     Bleeding    Codeine     Erythromycin Rash       Medications:  Continuous Infusions:  Scheduled Meds:   apixaban  5 mg Oral BID    balsam peru-castor oiL   Topical (Top) BID    furosemide  40 mg Oral Daily    losartan  25 mg Oral Daily    [START ON 6/10/2023] metoprolol succinate  25 mg Oral Daily     PRN Meds:acetaminophen, acetaminophen, dextrose 50%, dextrose 50%, glucagon (human recombinant), glucose, glucose, magnesium oxide, magnesium oxide, melatonin, ondansetron, potassium bicarbonate, potassium bicarbonate, potassium bicarbonate, potassium, sodium phosphates, potassium, sodium phosphates, potassium, sodium phosphates, prochlorperazine, sodium chloride 0.9%, white petrolatum    Family History       Problem Relation (Age of Onset)    Heart disease Mother          Tobacco Use    Smoking status: Never    Smokeless tobacco: Never   Substance and Sexual Activity    Alcohol use: No    Drug use: No    Sexual activity: Not Currently       Review of Systems   Unable to perform ROS: Patient unresponsive   Objective:     Vital Signs (Most Recent):  Temp: 98.2 °F (36.8 °C) (06/09/23 1219)  Pulse: (!) 124 (06/09/23 1219)  Resp: 18 (06/09/23 1219)  BP: 115/70 (06/09/23 1219)  SpO2: 96 % (06/09/23 1219) Vital Signs (24h Range):  Temp:  [97.3 °F (36.3 °C)-98.3 °F (36.8 °C)] 98.2 °F (36.8 °C)  Pulse:  [112-124] 124  Resp:  [18] 18  SpO2:  [95 %-96 %] 96 %  BP: (115-157)/(70-86) 115/70     Weight: 75.4 kg (166 lb 3.6 oz)  Body mass index is 26.83 kg/m².       Physical  Exam  Vitals reviewed.   Constitutional:       General: She is not in acute distress.     Appearance: She is ill-appearing.   HENT:      Head: Normocephalic and atraumatic.      Right Ear: External ear normal.      Left Ear: External ear normal.      Nose: Nose normal. No congestion.      Mouth/Throat:      Mouth: Mucous membranes are moist.      Pharynx: No oropharyngeal exudate.   Eyes:      General:         Right eye: No discharge.         Left eye: No discharge.      Extraocular Movements: Extraocular movements intact.   Cardiovascular:      Rate and Rhythm: Tachycardia present.      Pulses: Normal pulses.   Pulmonary:      Effort: Pulmonary effort is normal. No respiratory distress.   Abdominal:      Palpations: Abdomen is soft.      Tenderness: There is no abdominal tenderness.   Skin:     General: Skin is warm.      Findings: Bruising present.   Neurological:      General: No focal deficit present.      Mental Status: She is alert and oriented to person, place, and time.          Review of Symptoms      Symptom Assessment (ESAS 0-10 Scale)  Unable to complete assessment due to Patient unresponsive         Pain Assessment:    Location(s): foot      Pain Assessment in Advanced Demential Scale (PAINAD)   Breathing - Independent of vocalization:  0  Negative vocalization:  0  Facial expression:  0  Body language:  0  Consolability:  0  Total:  0    Performance Status:  10    Living Arrangements:  Lives in assisted living    Psychosocial/Cultural:   See Palliative Psychosocial Note: No  **Primary  to Follow**  Palliative Care  Consult: No      Advance Care Planning   Advance Directives:   LaPOST: Yes    Do Not Resuscitate Status: Yes      Decision Making:  Family answered questions and Patient unable to communicate due to disease severity/cognitive impairment  Goals of Care: The family endorses that what is most important right now is to focus on avoiding the hospital and comfort and QOL      Accordingly, we have decided that the best plan to meet the patient's goals includes enrolling in hospice care         Significant Labs: BMP:   Recent Labs   Lab 06/08/23 0641   *      K 4.1      CO2 23   BUN 36*   CREATININE 1.1   CALCIUM 8.8       CBC:   Recent Labs   Lab 06/08/23 0641   WBC 10.96   HGB 10.9*   HCT 33.5*          CBC:   Recent Labs   Lab 06/08/23 0641   WBC 10.96   HGB 10.9*   HCT 33.5*   MCV 93          BMP:  No results for input(s): GLU, NA, K, CL, CO2, BUN, CREATININE, CALCIUM, MG in the last 24 hours.    LFT:  Lab Results   Component Value Date    AST 20 06/08/2023    ALKPHOS 90 06/08/2023    BILITOT 1.1 (H) 06/08/2023     Albumin:   Albumin   Date Value Ref Range Status   06/08/2023 3.0 (L) 3.5 - 5.2 g/dL Final     Protein:   Total Protein   Date Value Ref Range Status   06/08/2023 6.9 6.0 - 8.4 g/dL Final     Lactic acid:   Lab Results   Component Value Date    LACTATE 1.6 12/13/2022    LACTATE 1.7 12/13/2022       Significant Imaging: I have reviewed all pertinent imaging results/findings within the past 24 hours.      > 50% of 80 min visit spent in chart review, face to face discussion of goals of care,  symptom assessment, coordination of care and emotional support.    Norm Sanchez MD  Palliative Medicine  [facility]

## 2023-06-09 NOTE — PT/OT/SLP PROGRESS
Physical Therapy      Patient Name:  Tierra Jennings   MRN:  [id number]    Patient not seen today secondary to Patient unwilling to participate, Other (Comment) (family declined therapy; per chart POC is now hospice)..

## 2023-06-09 NOTE — PROGRESS NOTES
Brief note:  Pt resting comfortably on rounds. Appears well compensated. Breathing comfortably on room air in flat position in bed.    Convert IV lasix to PO lasix 40 mg daily  Increase metoprolol to 25 mg daily  Continue Losartan 25 mg daily as directed    Cardiology will sign off.   Please re consult if needed.

## 2023-06-09 NOTE — PLAN OF CARE
Pt clear for DC from case management standpoint. Discharging to [facility] with [facility]     06/09/23 1310   Final Note   Assessment Type Final Discharge Note   [facility]

## 2023-06-13 ENCOUNTER — DOCUMENT SCAN (OUTPATIENT)
Dept: HOME HEALTH SERVICES | Facility: HOSPITAL | Age: 88
End: 2023-06-13
Payer: MEDICARE

## 2023-06-15 ENCOUNTER — EXTERNAL HOME HEALTH (OUTPATIENT)
Dept: HOME HEALTH SERVICES | Facility: HOSPITAL | Age: 88
End: 2023-06-15
Payer: MEDICARE

## 2023-06-15 ENCOUNTER — TELEPHONE (OUTPATIENT)
Dept: FAMILY MEDICINE | Facility: CLINIC | Age: 88
End: 2023-06-15

## 2023-06-15 NOTE — TELEPHONE ENCOUNTER
----- Message from Eva Kim sent at 6/15/2023  2:58 PM CDT -----  Uma with [facility] called and stated that she was advised by the doctor that he sent the progress note back on this patient and she is not sure where he sent it .please give her a call at [phone] and a fax number [phone]

## 2023-06-20 ENCOUNTER — TELEPHONE (OUTPATIENT)
Dept: FAMILY MEDICINE | Facility: CLINIC | Age: 88
End: 2023-06-20

## 2023-06-20 NOTE — TELEPHONE ENCOUNTER
----- Message from Analia Sethi sent at 6/20/2023 10:34 AM CDT -----  [facility] is calling and her death certificate is in [place]   [phone] laura

## 2025-01-02 NOTE — TELEPHONE ENCOUNTER
----- Message from Aliya Mcgee MA sent at 1/11/2023  3:39 PM CST -----    ----- Message -----  From: Eva Kim  Sent: 1/11/2023   3:36 PM CST  To: Misael Jasmine Staff    Patient son (Isidro)called and stated that he would like to see about the patient getting a hospital bed also he has paper work that the doctor need to fill out please give him a call at [phone]       Medication: Xarelto   Last office visit date: 11/30/2023  Medication Refill Protocol Failed.  Protocol approved due to: data identified in chart review.    eGFR completed 07/02/2024 at outside facility   Hemoglobin completed 07/02/2024 at outside facility   Remote transmission completed 10/09/2024